# Patient Record
Sex: FEMALE | Race: WHITE | Employment: OTHER | URBAN - METROPOLITAN AREA
[De-identification: names, ages, dates, MRNs, and addresses within clinical notes are randomized per-mention and may not be internally consistent; named-entity substitution may affect disease eponyms.]

---

## 2017-11-21 ENCOUNTER — OFFICE VISIT (OUTPATIENT)
Dept: SURGERY | Age: 72
End: 2017-11-21

## 2017-11-21 VITALS
WEIGHT: 210 LBS | SYSTOLIC BLOOD PRESSURE: 122 MMHG | HEART RATE: 79 BPM | BODY MASS INDEX: 34.99 KG/M2 | DIASTOLIC BLOOD PRESSURE: 71 MMHG | HEIGHT: 65 IN

## 2017-11-21 DIAGNOSIS — D05.12 DUCTAL CARCINOMA IN SITU (DCIS) OF LEFT BREAST: ICD-10-CM

## 2017-11-21 RX ORDER — LISINOPRIL AND HYDROCHLOROTHIAZIDE 10; 12.5 MG/1; MG/1
TABLET ORAL
COMMUNITY
Start: 2017-09-21

## 2017-11-21 RX ORDER — SERTRALINE HYDROCHLORIDE 100 MG/1
TABLET, FILM COATED ORAL
Refills: 1 | COMMUNITY
Start: 2017-09-07

## 2017-11-21 RX ORDER — OMEPRAZOLE 20 MG/1
CAPSULE, DELAYED RELEASE ORAL
COMMUNITY
Start: 2017-08-14

## 2017-11-21 RX ORDER — LEVOTHYROXINE SODIUM 88 UG/1
TABLET ORAL
COMMUNITY
Start: 2017-09-21

## 2017-11-21 RX ORDER — ATORVASTATIN CALCIUM 10 MG/1
TABLET, FILM COATED ORAL
Refills: 3 | COMMUNITY
Start: 2017-10-31

## 2017-11-21 RX ORDER — IMIPRAMINE HYDROCHLORIDE 50 MG/1
TABLET ORAL
Refills: 4 | COMMUNITY
Start: 2017-10-26

## 2017-11-21 RX ORDER — MELOXICAM 15 MG/1
TABLET ORAL
Refills: 0 | COMMUNITY
Start: 2017-08-15

## 2017-11-21 RX ORDER — TIZANIDINE 2 MG/1
TABLET ORAL
Refills: 0 | COMMUNITY
Start: 2017-08-15

## 2017-11-21 NOTE — PROGRESS NOTES
HISTORY OF PRESENT ILLNESS  Ana Mccall is a 81 y.o. female. HPI NEW patient referral for consultation by Dr. Annette Lynn for a new LEFT breast cancer diagnosis. The patient had an abnormal mammogram that led to a biopsy. She has skin lesions on her breasts from scratching, but does not have any palpable breast masses or nipple changes. She has a history of a benign brain tumor that was treated with radiation in  but is still present. She is having a lot of symptoms which she attributes to the radiation for her tumor including unsteady gait, dizziness, vision issues, losing teeth, extreme fatigue, SOB and short term memory deficit. She is accompanied by her daughter and son in law. 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 MD 6%    OB History   Obstetric Comments   Menarche: 14. LMP:52  # of Children: 3  Age at Delivery of First Child: 26.   Hysterectomy/oophorectomy:  No/no. Breast Bx: yes LEFT . Hx of Breast Feeding: no. BCP: no. Hormone therapy: no.    Family history. No breast or ovarian cancer. Sister  of brain cancer. Mammogram BIRADS 4  Pt has 4 clips in the LEFT breast.  I do not have reports yet from the mammogram, but I have a film with the cluster of calcs 10/2017 and a follow up 2017 where a clip is visible at the site and calcs are not longer present. Review of Systems   Constitutional: Positive for malaise/fatigue. Eyes: Positive for blurred vision. Cardiovascular: Positive for orthopnea. Gastrointestinal: Positive for diarrhea. Genitourinary: Negative. Musculoskeletal: Positive for back pain, joint pain and myalgias. Neurological: Positive for dizziness and weakness. Psychiatric/Behavioral: Positive for depression and memory loss. The patient is nervous/anxious and has insomnia. Physical Exam   Pulmonary/Chest: Right breast exhibits no inverted nipple, no mass, no nipple discharge, no skin change and no tenderness.  Left breast exhibits skin change (ecchymosis lateral breast at biopsy site.   scratch marks upper breast). Left breast exhibits no inverted nipple, no mass, no nipple discharge and no tenderness. Breasts are symmetrical.       Lymphadenopathy:     She has no cervical adenopathy. She has no axillary adenopathy. Right: No supraclavicular adenopathy present. Left: No supraclavicular adenopathy present. ASSESSMENT and PLAN    ICD-10-CM ICD-9-CM    1. Ductal carcinoma in situ (DCIS) of left breast D05.12 233.0 BORA MAMMO LT DX INCL CAD       45 minutes were spent face-to-face with the patient, her daughter and son-in-law during this encounter and 80 of that time was spent on counseling and coordination of care. 1. Discussed lumpectomy vs observation. We discussed stage 0 breast cancer. Standard treatment for DCIS was surgical excision, but now observation of DCIS is being considered. She is a good candidate for observation as her disease is localized, and may have all been removed with the needle biopsy. She has co-morbidites and avoiding surgery is beneficial.  We reviewed her mammograms. She had a small cluster of microcalcifications biopsied, and no microcalcifications seen on the post-biopsy mammogram.  It is possible all the DCIS was removed with the needle biopsy. 2. If she had a lumpectomy and the amount of disease was minimal she would not need radiation. She did not want any radiation as she feels the brain radiation contributed to her current problems. 3. Endocrine therapy may decrease her risk of recurrence or a new tumor, but risks may outweigh benefit. 4. Her tumor is non-invasive so sentinel node biopsy is not necessary. PLAN:  LEFT breast mammogram in 6 months, then yearly mammograms. I have given her a prescription for a LEFT diagnostic mammogram which she will schedule in May 2018 at [facility]. If there is increased activity on the mammogram then lumpectomy would be recommended.   I reviewed the process of needle localized lumpectomy with sedation anesthesia.

## 2017-11-21 NOTE — MR AVS SNAPSHOT
Visit Information Date & Time Provider Department Dept. Phone Encounter #  
 11/21/2017 12:00 PM Eduardo Boswell MD [facility] [id number] Upcoming Health Maintenance Date Due Hepatitis C Screening 1945 DTaP/Tdap/Td series (1 - Tdap) 2/22/1968 BREAST CANCER SCRN MAMMOGRAM 2/22/1997 FOBT Q 1 YEAR AGE 50-75 2/22/1997 ZOSTER VACCINE AGE 60> 12/22/2006 GLAUCOMA SCREENING Q2Y 2/22/2012 OSTEOPOROSIS SCREENING (DEXA) 2/22/2012 Pneumococcal 65+ Low/Medium Risk (1 of 2 - PCV13) 2/22/2012 MEDICARE YEARLY EXAM 2/22/2012 Influenza Age 5 to Adult 8/1/2017 Allergies as of 11/21/2017  Review Complete On: 11/21/2017 By: Eduardo Boswell MD  
 No Known Allergies Current Immunizations  Never Reviewed No immunizations on file. Not reviewed this visit You Were Diagnosed With   
  
 Codes Comments Ductal carcinoma in situ (DCIS) of left breast     ICD-10-CM: D05.12 
ICD-9-CM: 233.0 Vitals BP Pulse Height(growth percentile) Weight(growth percentile) BMI OB Status 122/71 79 5' 4.5\" (1.638 m) 210 lb (95.3 kg) 35.49 kg/m2 Postmenopausal  
 Smoking Status Never Smoker BMI and BSA Data Body Mass Index Body Surface Area  
 35.49 kg/m 2 2.08 m 2 Your Updated Medication List  
  
   
This list is accurate as of: 11/21/17  4:14 PM.  Always use your most recent med list.  
  
  
  
  
 atorvastatin 10 mg tablet Commonly known as:  LIPITOR TK 1 T PO QD  
  
 imipramine 50 mg tablet Commonly known as:  TOFRANIL  
TK 1 T PO QD HS  
  
 levothyroxine 88 mcg tablet Commonly known as:  SYNTHROID  
  
 lisinopril-hydroCHLOROthiazide 10-12.5 mg per tablet Commonly known as:  PRINZIDE, ZESTORETIC  
  
 meloxicam 15 mg tablet Commonly known as:  MOBIC TK 1 T PO QD  
  
 omeprazole 20 mg capsule Commonly known as:  PRILOSEC  
  
 sertraline 100 mg tablet Commonly known as:  ZOLOFT  
TK 1 T PO D  
  
 tiZANidine 2 mg tablet Commonly known as:  Clemencia Chang TK 1 T PO TID To-Do List   
 05/21/2018 Imaging:  BORA MAMMO LT DX INCL CAD Patient Instructions Breast Cancer: Care Instructions Your Care Instructions Breast cancer occurs when abnormal cells grow out of control in the breast. These cancer cells can spread within the breast, to nearby lymph nodes and other tissues, and to other parts of the body. Being treated for cancer can weaken your body, and you may feel very tired. Get the rest your body needs so you can feel better. Finding out that you have cancer is scary. You may feel many emotions and may need some help coping. Seek out family, friends, and counselors for support. You also can do things at home to make yourself feel better while you go through treatment. Call the [employer] [facility] ([phone]) or visit its website at [employer] for more information. Follow-up care is a key part of your treatment and safety. Be sure to make and go to all appointments, and call your doctor if you are having problems. It's also a good idea to know your test results and keep a list of the medicines you take. How can you care for yourself at home? · Take your medicines exactly as prescribed. Call your doctor if you think you are having a problem with your medicine. You may get medicine for nausea and vomiting if you have these side effects. · Follow your doctor's instructions to relieve pain. Pain from cancer and surgery can almost always be controlled. Use pain medicine when you first notice pain, before it becomes severe. · Eat healthy food. If you do not feel like eating, try to eat food that has protein and extra calories to keep up your strength and prevent weight loss. Drink liquid meal replacements for extra calories and protein. Try to eat your main meal early. · Get some physical activity every day, but do not get too tired. Keep doing the hobbies you enjoy as your energy allows. · Do not smoke. Smoking can make your cancer worse. If you need help quitting, talk to your doctor about stop-smoking programs and medicines. These can increase your chances of quitting for good. · Take steps to control your stress and workload. Learn relaxation techniques. ¨ Share your feelings. Stress and tension affect our emotions. By expressing your feelings to others, you may be able to understand and cope with them. ¨ Consider joining a support group. Talking about a problem with your spouse, a good friend, or other people with similar problems is a good way to reduce tension and stress. ¨ Express yourself through art. Try writing, crafts, dance, or art to relieve stress. Some dance, writing, or art groups may be available just for people who have cancer. ¨ Be kind to your body and mind. Getting enough sleep, eating a healthy diet, and taking time to do things you enjoy can contribute to an overall feeling of balance in your life and can help reduce stress. ¨ Get help if you need it. Discuss your concerns with your doctor or counselor. · If you are vomiting or have diarrhea: ¨ Drink plenty of fluids (enough so that your urine is light yellow or clear like water) to prevent dehydration. Choose water and other caffeine-free clear liquids. If you have kidney, heart, or liver disease and have to limit fluids, talk with your doctor before you increase the amount of fluids you drink. ¨ When you are able to eat, try clear soups, mild foods, and liquids until all symptoms are gone for 12 to 48 hours. Other good choices include dry toast, crackers, cooked cereal, and gelatin dessert, such as Jell-O. · If you have not already done so, prepare a list of advance directives.  Advance directives are instructions to your doctor and family members about what kind of care you want if you become unable to speak or express yourself. When should you call for help? Call 911 anytime you think you may need emergency care. For example, call if: 
? · You passed out (lost consciousness). ?Call your doctor now or seek immediate medical care if: 
? · You have a fever. ? · You have abnormal bleeding. ? · You think you have an infection. ? · You have new or worse pain. ? · You have new symptoms, such as a cough, belly pain, vomiting, diarrhea, or a rash. ? Watch closely for changes in your health, and be sure to contact your doctor if: 
? · You are much more tired than usual.  
? · You have swollen glands in your armpits, groin, or neck. ? · You do not get better as expected. Where can you learn more? Go to http://arlene-william.info/. Enter V321 in the search box to learn more about \"Breast Cancer: Care Instructions. \" Current as of: May 12, 2017 Content Version: 11.4 © 1982-5024 [employer]. Care instructions adapted under license by [employer] (which disclaims liability or warranty for this information). If you have questions about a medical condition or this instruction, always ask your healthcare professional. [address] any warranty or liability for your use of this information. Introducing [facility]! Geoffrey Ricks introduces [employer] patient portal. Now you can access parts of your medical record, email your doctor's office, and request medication refills online. 1. In your internet browser, go to [email or web address] 2. Click on the First Time User? Click Here link in the Sign In box. You will see the New Member Sign Up page. 3. Enter your [employer] Access Code exactly as it appears below. You will not need to use this code after youve completed the sign-up process. If you do not sign up before the expiration date, you must request a new code. · [employer] Access Code: [email or web address] Expires: 2/19/2018 11:21 AM 
 
4. Enter the last four digits of your Social Security Number (xxxx) and Date of Birth (mm/dd/yyyy) as indicated and click Submit. You will be taken to the next sign-up page. 5. Create a [employer] ID. This will be your [employer] login ID and cannot be changed, so think of one that is secure and easy to remember. 6. Create a [employer] password. You can change your password at any time. 7. Enter your Password Reset Question and Answer. This can be used at a later time if you forget your password. 8. Enter your e-mail address. You will receive e-mail notification when new information is available in [address] 9. Click Sign Up. You can now view and download portions of your medical record. 10. Click the Download Summary menu link to download a portable copy of your medical information. If you have questions, please visit the Frequently Asked Questions section of the [employer] website. Remember, [employer] is NOT to be used for urgent needs. For medical emergencies, dial 911. Now available from your iPhone and Android! Please provide this summary of care documentation to your next provider. Your primary care clinician is listed as Mili Bradshaw. If you have any questions after today's visit, please call [phone].

## 2017-11-21 NOTE — PATIENT INSTRUCTIONS
Breast Cancer: Care Instructions  Your Care Instructions    Breast cancer occurs when abnormal cells grow out of control in the breast. These cancer cells can spread within the breast, to nearby lymph nodes and other tissues, and to other parts of the body. Being treated for cancer can weaken your body, and you may feel very tired. Get the rest your body needs so you can feel better. Finding out that you have cancer is scary. You may feel many emotions and may need some help coping. Seek out family, friends, and counselors for support. You also can do things at home to make yourself feel better while you go through treatment. Call the [employer] (2-475.728.4342) or visit its website at [employer] for more information. Follow-up care is a key part of your treatment and safety. Be sure to make and go to all appointments, and call your doctor if you are having problems. It's also a good idea to know your test results and keep a list of the medicines you take. How can you care for yourself at home? · Take your medicines exactly as prescribed. Call your doctor if you think you are having a problem with your medicine. You may get medicine for nausea and vomiting if you have these side effects. · Follow your doctor's instructions to relieve pain. Pain from cancer and surgery can almost always be controlled. Use pain medicine when you first notice pain, before it becomes severe. · Eat healthy food. If you do not feel like eating, try to eat food that has protein and extra calories to keep up your strength and prevent weight loss. Drink liquid meal replacements for extra calories and protein. Try to eat your main meal early. · Get some physical activity every day, but do not get too tired. Keep doing the hobbies you enjoy as your energy allows. · Do not smoke. Smoking can make your cancer worse. If you need help quitting, talk to your doctor about stop-smoking programs and medicines.  These can increase your chances of quitting for good. · Take steps to control your stress and workload. Learn relaxation techniques. ¨ Share your feelings. Stress and tension affect our emotions. By expressing your feelings to others, you may be able to understand and cope with them. ¨ Consider joining a support group. Talking about a problem with your spouse, a good friend, or other people with similar problems is a good way to reduce tension and stress. ¨ Express yourself through art. Try writing, crafts, dance, or art to relieve stress. Some dance, writing, or art groups may be available just for people who have cancer. ¨ Be kind to your body and mind. Getting enough sleep, eating a healthy diet, and taking time to do things you enjoy can contribute to an overall feeling of balance in your life and can help reduce stress. ¨ Get help if you need it. Discuss your concerns with your doctor or counselor. · If you are vomiting or have diarrhea:  ¨ Drink plenty of fluids (enough so that your urine is light yellow or clear like water) to prevent dehydration. Choose water and other caffeine-free clear liquids. If you have kidney, heart, or liver disease and have to limit fluids, talk with your doctor before you increase the amount of fluids you drink. ¨ When you are able to eat, try clear soups, mild foods, and liquids until all symptoms are gone for 12 to 48 hours. Other good choices include dry toast, crackers, cooked cereal, and gelatin dessert, such as Jell-O.  · If you have not already done so, prepare a list of advance directives. Advance directives are instructions to your doctor and family members about what kind of care you want if you become unable to speak or express yourself. When should you call for help? Call 911 anytime you think you may need emergency care. For example, call if:  ? · You passed out (lost consciousness). ?Call your doctor now or seek immediate medical care if:  ? · You have a fever. ? · You have abnormal bleeding. ? · You think you have an infection. ? · You have new or worse pain. ? · You have new symptoms, such as a cough, belly pain, vomiting, diarrhea, or a rash. ? Watch closely for changes in your health, and be sure to contact your doctor if:  ? · You are much more tired than usual.   ? · You have swollen glands in your armpits, groin, or neck. ? · You do not get better as expected. Where can you learn more? Go to http://arlene-william.info/. Enter V321 in the search box to learn more about \"Breast Cancer: Care Instructions. \"  Current as of: May 12, 2017  Content Version: 11.4  © 4565-0070 [employer]. Care instructions adapted under license by [employer] (which disclaims liability or warranty for this information). If you have questions about a medical condition or this instruction, always ask your healthcare professional. [address] any warranty or liability for your use of this information.

## 2017-11-21 NOTE — COMMUNICATION BODY
HISTORY OF PRESENT ILLNESS  Ashok Webb is a 81 y.o. female. HPI NEW patient referral for consultation by Dr. Alisa Lindsey for a new LEFT breast cancer diagnosis. The patient had an abnormal mammogram that led to a biopsy. She has skin lesions on her breasts from scratching, but does not have any palpable breast masses or nipple changes. She has a history of a benign brain tumor that was treated with radiation in  but is still present. She is having a lot of symptoms which she attributes to the radiation for her tumor including unsteady gait, dizziness, vision issues, losing teeth, extreme fatigue, SOB and short term memory deficit. She is accompanied by her daughter and son in law. 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 OH 6%    OB History   Obstetric Comments   Menarche: 14. LMP:52  # of Children: 3  Age at Delivery of First Child: 26.   Hysterectomy/oophorectomy:  No/no. Breast Bx: yes LEFT . Hx of Breast Feeding: no. BCP: no. Hormone therapy: no.    Family history. No breast or ovarian cancer. Sister  of brain cancer. Mammogram BIRADS 4  Pt has 4 clips in the LEFT breast.  I do not have reports yet from the mammogram, but I have a film with the cluster of calcs 10/2017 and a follow up 2017 where a clip is visible at the site and calcs are not longer present. Review of Systems   Constitutional: Positive for malaise/fatigue. Eyes: Positive for blurred vision. Cardiovascular: Positive for orthopnea. Gastrointestinal: Positive for diarrhea. Genitourinary: Negative. Musculoskeletal: Positive for back pain, joint pain and myalgias. Neurological: Positive for dizziness and weakness. Psychiatric/Behavioral: Positive for depression and memory loss. The patient is nervous/anxious and has insomnia. Physical Exam   Pulmonary/Chest: Right breast exhibits no inverted nipple, no mass, no nipple discharge, no skin change and no tenderness.  Left breast exhibits skin change (ecchymosis lateral breast at biopsy site.   scratch marks upper breast). Left breast exhibits no inverted nipple, no mass, no nipple discharge and no tenderness. Breasts are symmetrical.       Lymphadenopathy:     She has no cervical adenopathy. She has no axillary adenopathy. Right: No supraclavicular adenopathy present. Left: No supraclavicular adenopathy present. ASSESSMENT and PLAN    ICD-10-CM ICD-9-CM    1. Ductal carcinoma in situ (DCIS) of left breast D05.12 233.0 BORA MAMMO LT DX INCL CAD       45 minutes were spent face-to-face with the patient, her daughter and son-in-law during this encounter and 80 of that time was spent on counseling and coordination of care. 1. Discussed lumpectomy vs observation. We discussed stage 0 breast cancer. Standard treatment for DCIS was surgical excision, but now observation of DCIS is being considered. She is a good candidate for observation as her disease is localized, and may have all been removed with the needle biopsy. She has co-morbidites and avoiding surgery is beneficial.  We reviewed her mammograms. She had a small cluster of microcalcifications biopsied, and no microcalcifications seen on the post-biopsy mammogram.  It is possible all the DCIS was removed with the needle biopsy. 2. If she had a lumpectomy and the amount of disease was minimal she would not need radiation. She did not want any radiation as she feels the brain radiation contributed to her current problems. 3. Endocrine therapy may decrease her risk of recurrence or a new tumor, but risks may outweigh benefit. 4. Her tumor is non-invasive so sentinel node biopsy is not necessary. PLAN:  LEFT breast mammogram in 6 months, then yearly mammograms. I have given her a prescription for a LEFT diagnostic mammogram which she will schedule in May 2018 at [facility]. If there is increased activity on the mammogram then lumpectomy would be recommended.   I reviewed the process of needle localized lumpectomy with sedation anesthesia.

## 2018-01-30 ENCOUNTER — OP HISTORICAL/CONVERTED ENCOUNTER (OUTPATIENT)
Dept: OTHER | Age: 73
End: 2018-01-30

## 2018-04-04 ENCOUNTER — OP HISTORICAL/CONVERTED ENCOUNTER (OUTPATIENT)
Dept: OTHER | Age: 73
End: 2018-04-04

## 2018-05-01 ENCOUNTER — OP HISTORICAL/CONVERTED ENCOUNTER (OUTPATIENT)
Dept: OTHER | Age: 73
End: 2018-05-01

## 2018-08-01 ENCOUNTER — OP HISTORICAL/CONVERTED ENCOUNTER (OUTPATIENT)
Dept: OTHER | Age: 73
End: 2018-08-01

## 2018-08-07 ENCOUNTER — OP HISTORICAL/CONVERTED ENCOUNTER (OUTPATIENT)
Dept: OTHER | Age: 73
End: 2018-08-07

## 2019-02-11 ENCOUNTER — OP HISTORICAL/CONVERTED ENCOUNTER (OUTPATIENT)
Dept: OTHER | Age: 74
End: 2019-02-11

## 2019-08-02 ENCOUNTER — OP HISTORICAL/CONVERTED ENCOUNTER (OUTPATIENT)
Dept: OTHER | Age: 74
End: 2019-08-02

## 2019-08-07 ENCOUNTER — OP HISTORICAL/CONVERTED ENCOUNTER (OUTPATIENT)
Dept: OTHER | Age: 74
End: 2019-08-07

## 2019-09-13 ENCOUNTER — OP HISTORICAL/CONVERTED ENCOUNTER (OUTPATIENT)
Dept: OTHER | Age: 74
End: 2019-09-13

## 2019-10-11 ENCOUNTER — OP HISTORICAL/CONVERTED ENCOUNTER (OUTPATIENT)
Dept: OTHER | Age: 74
End: 2019-10-11

## 2019-10-23 ENCOUNTER — OP HISTORICAL/CONVERTED ENCOUNTER (OUTPATIENT)
Dept: OTHER | Age: 74
End: 2019-10-23

## 2020-02-04 ENCOUNTER — OP HISTORICAL/CONVERTED ENCOUNTER (OUTPATIENT)
Dept: OTHER | Age: 75
End: 2020-02-04

## 2021-01-01 ENCOUNTER — APPOINTMENT (OUTPATIENT)
Dept: GENERAL RADIOLOGY | Age: 76
DRG: 208 | End: 2021-01-01
Attending: INTERNAL MEDICINE
Payer: MEDICARE

## 2021-01-01 ENCOUNTER — APPOINTMENT (OUTPATIENT)
Dept: GENERAL RADIOLOGY | Age: 76
DRG: 208 | End: 2021-01-01
Attending: STUDENT IN AN ORGANIZED HEALTH CARE EDUCATION/TRAINING PROGRAM
Payer: MEDICARE

## 2021-01-01 ENCOUNTER — APPOINTMENT (OUTPATIENT)
Dept: NON INVASIVE DIAGNOSTICS | Age: 76
DRG: 208 | End: 2021-01-01
Attending: INTERNAL MEDICINE
Payer: MEDICARE

## 2021-01-01 ENCOUNTER — HOSPITAL ENCOUNTER (INPATIENT)
Age: 76
LOS: 25 days | DRG: 208 | End: 2021-04-29
Attending: STUDENT IN AN ORGANIZED HEALTH CARE EDUCATION/TRAINING PROGRAM | Admitting: HOSPITALIST
Payer: MEDICARE

## 2021-01-01 ENCOUNTER — APPOINTMENT (OUTPATIENT)
Dept: CT IMAGING | Age: 76
DRG: 208 | End: 2021-01-01
Attending: INTERNAL MEDICINE
Payer: MEDICARE

## 2021-01-01 VITALS
SYSTOLIC BLOOD PRESSURE: 96 MMHG | WEIGHT: 202.82 LBS | TEMPERATURE: 98.5 F | BODY MASS INDEX: 32.6 KG/M2 | HEIGHT: 66 IN | DIASTOLIC BLOOD PRESSURE: 63 MMHG | HEART RATE: 122 BPM | OXYGEN SATURATION: 100 % | RESPIRATION RATE: 30 BRPM

## 2021-01-01 DIAGNOSIS — U07.1 COVID-19: ICD-10-CM

## 2021-01-01 DIAGNOSIS — J69.0 ASPIRATION PNEUMONIA, UNSPECIFIED ASPIRATION PNEUMONIA TYPE, UNSPECIFIED LATERALITY, UNSPECIFIED PART OF LUNG (HCC): ICD-10-CM

## 2021-01-01 DIAGNOSIS — A41.9 SEPSIS, DUE TO UNSPECIFIED ORGANISM, UNSPECIFIED WHETHER ACUTE ORGAN DYSFUNCTION PRESENT (HCC): ICD-10-CM

## 2021-01-01 DIAGNOSIS — N17.9 AKI (ACUTE KIDNEY INJURY) (HCC): ICD-10-CM

## 2021-01-01 DIAGNOSIS — J18.9 PNEUMONITIS: Primary | ICD-10-CM

## 2021-01-01 DIAGNOSIS — J96.91 RESPIRATORY FAILURE WITH HYPOXIA, UNSPECIFIED CHRONICITY (HCC): ICD-10-CM

## 2021-01-01 LAB
ALBUMIN SERPL-MCNC: 1.7 G/DL (ref 3.5–5)
ALBUMIN SERPL-MCNC: 2.3 G/DL (ref 3.5–5)
ALBUMIN SERPL-MCNC: 2.4 G/DL (ref 3.5–5)
ALBUMIN SERPL-MCNC: 2.5 G/DL (ref 3.5–5)
ALBUMIN SERPL-MCNC: 2.5 G/DL (ref 3.5–5)
ALBUMIN SERPL-MCNC: 2.6 G/DL (ref 3.5–5)
ALBUMIN SERPL-MCNC: 2.7 G/DL (ref 3.5–5)
ALBUMIN SERPL-MCNC: 2.8 G/DL (ref 3.5–5)
ALBUMIN SERPL-MCNC: 2.8 G/DL (ref 3.5–5)
ALBUMIN SERPL-MCNC: 2.9 G/DL (ref 3.5–5)
ALBUMIN SERPL-MCNC: 3 G/DL (ref 3.5–5)
ALBUMIN SERPL-MCNC: 3 G/DL (ref 3.5–5)
ALBUMIN/GLOB SERPL: 0.6 {RATIO} (ref 1.1–2.2)
ALBUMIN/GLOB SERPL: 0.6 {RATIO} (ref 1.1–2.2)
ALBUMIN/GLOB SERPL: 0.7 {RATIO} (ref 1.1–2.2)
ALBUMIN/GLOB SERPL: 0.8 {RATIO} (ref 1.1–2.2)
ALP SERPL-CCNC: 100 U/L (ref 45–117)
ALP SERPL-CCNC: 102 U/L (ref 45–117)
ALP SERPL-CCNC: 68 U/L (ref 45–117)
ALP SERPL-CCNC: 68 U/L (ref 45–117)
ALP SERPL-CCNC: 73 U/L (ref 45–117)
ALP SERPL-CCNC: 75 U/L (ref 45–117)
ALP SERPL-CCNC: 76 U/L (ref 45–117)
ALP SERPL-CCNC: 78 U/L (ref 45–117)
ALP SERPL-CCNC: 82 U/L (ref 45–117)
ALP SERPL-CCNC: 83 U/L (ref 45–117)
ALP SERPL-CCNC: 84 U/L (ref 45–117)
ALP SERPL-CCNC: 85 U/L (ref 45–117)
ALP SERPL-CCNC: 87 U/L (ref 45–117)
ALP SERPL-CCNC: 87 U/L (ref 45–117)
ALP SERPL-CCNC: 90 U/L (ref 45–117)
ALP SERPL-CCNC: 91 U/L (ref 45–117)
ALP SERPL-CCNC: 95 U/L (ref 45–117)
ALP SERPL-CCNC: 97 U/L (ref 45–117)
ALP SERPL-CCNC: 98 U/L (ref 45–117)
ALT SERPL-CCNC: 23 U/L (ref 12–78)
ALT SERPL-CCNC: 25 U/L (ref 12–78)
ALT SERPL-CCNC: 25 U/L (ref 12–78)
ALT SERPL-CCNC: 26 U/L (ref 12–78)
ALT SERPL-CCNC: 26 U/L (ref 12–78)
ALT SERPL-CCNC: 27 U/L (ref 12–78)
ALT SERPL-CCNC: 27 U/L (ref 12–78)
ALT SERPL-CCNC: 28 U/L (ref 12–78)
ALT SERPL-CCNC: 28 U/L (ref 12–78)
ALT SERPL-CCNC: 29 U/L (ref 12–78)
ALT SERPL-CCNC: 30 U/L (ref 12–78)
ALT SERPL-CCNC: 30 U/L (ref 12–78)
ALT SERPL-CCNC: 31 U/L (ref 12–78)
ALT SERPL-CCNC: 32 U/L (ref 12–78)
ALT SERPL-CCNC: 34 U/L (ref 12–78)
ALT SERPL-CCNC: 34 U/L (ref 12–78)
ALT SERPL-CCNC: 35 U/L (ref 12–78)
ALT SERPL-CCNC: 37 U/L (ref 12–78)
ALT SERPL-CCNC: 37 U/L (ref 12–78)
ALT SERPL-CCNC: 39 U/L (ref 12–78)
ALT SERPL-CCNC: 42 U/L (ref 12–78)
ANION GAP SERPL CALC-SCNC: 10 MMOL/L (ref 5–15)
ANION GAP SERPL CALC-SCNC: 10 MMOL/L (ref 5–15)
ANION GAP SERPL CALC-SCNC: 15 MMOL/L (ref 5–15)
ANION GAP SERPL CALC-SCNC: 4 MMOL/L (ref 5–15)
ANION GAP SERPL CALC-SCNC: 5 MMOL/L (ref 5–15)
ANION GAP SERPL CALC-SCNC: 5 MMOL/L (ref 5–15)
ANION GAP SERPL CALC-SCNC: 6 MMOL/L (ref 5–15)
ANION GAP SERPL CALC-SCNC: 7 MMOL/L (ref 5–15)
ANION GAP SERPL CALC-SCNC: 8 MMOL/L (ref 5–15)
ANION GAP SERPL CALC-SCNC: 9 MMOL/L (ref 5–15)
APPEARANCE UR: ABNORMAL
APPEARANCE UR: CLEAR
ARTERIAL PATENCY WRIST A: ABNORMAL
ARTERIAL PATENCY WRIST A: NORMAL
ARTERIAL PATENCY WRIST A: POSITIVE
ARTERIAL PATENCY WRIST A: POSITIVE
AST SERPL W P-5'-P-CCNC: 20 U/L (ref 15–37)
AST SERPL W P-5'-P-CCNC: 21 U/L (ref 15–37)
AST SERPL W P-5'-P-CCNC: 23 U/L (ref 15–37)
AST SERPL W P-5'-P-CCNC: 27 U/L (ref 15–37)
AST SERPL W P-5'-P-CCNC: 29 U/L (ref 15–37)
AST SERPL W P-5'-P-CCNC: 30 U/L (ref 15–37)
AST SERPL W P-5'-P-CCNC: 34 U/L (ref 15–37)
AST SERPL W P-5'-P-CCNC: 36 U/L (ref 15–37)
AST SERPL W P-5'-P-CCNC: 37 U/L (ref 15–37)
AST SERPL W P-5'-P-CCNC: 37 U/L (ref 15–37)
AST SERPL W P-5'-P-CCNC: 41 U/L (ref 15–37)
AST SERPL W P-5'-P-CCNC: 42 U/L (ref 15–37)
AST SERPL W P-5'-P-CCNC: 42 U/L (ref 15–37)
AST SERPL W P-5'-P-CCNC: 43 U/L (ref 15–37)
AST SERPL W P-5'-P-CCNC: 44 U/L (ref 15–37)
AST SERPL W P-5'-P-CCNC: 45 U/L (ref 15–37)
AST SERPL W P-5'-P-CCNC: 45 U/L (ref 15–37)
AST SERPL W P-5'-P-CCNC: 48 U/L (ref 15–37)
AST SERPL W P-5'-P-CCNC: 51 U/L (ref 15–37)
AST SERPL W P-5'-P-CCNC: 52 U/L (ref 15–37)
AST SERPL W P-5'-P-CCNC: 57 U/L (ref 15–37)
ATRIAL RATE: 123 BPM
BACTERIA SPEC CULT: NORMAL
BACTERIA SPEC CULT: NORMAL
BACTERIA URNS QL MICRO: ABNORMAL /HPF
BACTERIA URNS QL MICRO: NEGATIVE /HPF
BASE DEFICIT BLDA-SCNC: 0 MMOL/L (ref 0–2)
BASE DEFICIT BLDA-SCNC: 11.4 MMOL/L (ref 0–2)
BASE DEFICIT BLDA-SCNC: 2.6 MMOL/L (ref 0–2)
BASE DEFICIT BLDA-SCNC: 8 MMOL/L (ref 0–2)
BASE EXCESS BLDA CALC-SCNC: 1.1 MMOL/L (ref 0–2)
BASE EXCESS BLDA CALC-SCNC: 3.4 MMOL/L (ref 0–2)
BASE EXCESS BLDA CALC-SCNC: 3.7 MMOL/L (ref 0–2)
BASE EXCESS BLDA CALC-SCNC: 4.2 MMOL/L (ref 0–2)
BASE EXCESS BLDA CALC-SCNC: 4.3 MMOL/L (ref 0–2)
BASE EXCESS BLDA CALC-SCNC: 4.7 MMOL/L (ref 0–2)
BASE EXCESS BLDA CALC-SCNC: 4.8 MMOL/L (ref 0–2)
BASE EXCESS BLDA CALC-SCNC: 7.2 MMOL/L (ref 0–2)
BASE EXCESS BLDA CALC-SCNC: 8.7 MMOL/L (ref 0–2)
BASE EXCESS BLDA CALC-SCNC: 8.9 MMOL/L (ref 0–2)
BASOPHILS # BLD: 0 K/UL (ref 0–0.1)
BASOPHILS NFR BLD: 0 % (ref 0–1)
BDY SITE: ABNORMAL
BDY SITE: NORMAL
BDY SITE: NORMAL
BILIRUB DIRECT SERPL-MCNC: 0.1 MG/DL (ref 0–0.2)
BILIRUB SERPL-MCNC: 0.2 MG/DL (ref 0.2–1)
BILIRUB SERPL-MCNC: 0.3 MG/DL (ref 0.2–1)
BILIRUB SERPL-MCNC: 0.4 MG/DL (ref 0.2–1)
BILIRUB SERPL-MCNC: 0.5 MG/DL (ref 0.2–1)
BILIRUB SERPL-MCNC: 0.7 MG/DL (ref 0.2–1)
BILIRUB SERPL-MCNC: 0.7 MG/DL (ref 0.2–1)
BILIRUB UR QL: NEGATIVE
BILIRUB UR QL: NEGATIVE
BNP SERPL-MCNC: 615 PG/ML
BUN SERPL-MCNC: 18 MG/DL (ref 6–20)
BUN SERPL-MCNC: 19 MG/DL (ref 6–20)
BUN SERPL-MCNC: 20 MG/DL (ref 6–20)
BUN SERPL-MCNC: 21 MG/DL (ref 6–20)
BUN SERPL-MCNC: 23 MG/DL (ref 6–20)
BUN SERPL-MCNC: 23 MG/DL (ref 6–20)
BUN SERPL-MCNC: 24 MG/DL (ref 6–20)
BUN SERPL-MCNC: 25 MG/DL (ref 6–20)
BUN SERPL-MCNC: 25 MG/DL (ref 6–20)
BUN SERPL-MCNC: 27 MG/DL (ref 6–20)
BUN SERPL-MCNC: 27 MG/DL (ref 6–20)
BUN SERPL-MCNC: 29 MG/DL (ref 6–20)
BUN SERPL-MCNC: 30 MG/DL (ref 6–20)
BUN SERPL-MCNC: 33 MG/DL (ref 6–20)
BUN SERPL-MCNC: 33 MG/DL (ref 6–20)
BUN SERPL-MCNC: 34 MG/DL (ref 6–20)
BUN SERPL-MCNC: 35 MG/DL (ref 6–20)
BUN SERPL-MCNC: 35 MG/DL (ref 6–20)
BUN SERPL-MCNC: 36 MG/DL (ref 6–20)
BUN SERPL-MCNC: 38 MG/DL (ref 6–20)
BUN SERPL-MCNC: 42 MG/DL (ref 6–20)
BUN SERPL-MCNC: 46 MG/DL (ref 6–20)
BUN/CREAT SERPL: 15 (ref 12–20)
BUN/CREAT SERPL: 16 (ref 12–20)
BUN/CREAT SERPL: 16 (ref 12–20)
BUN/CREAT SERPL: 18 (ref 12–20)
BUN/CREAT SERPL: 18 (ref 12–20)
BUN/CREAT SERPL: 19 (ref 12–20)
BUN/CREAT SERPL: 21 (ref 12–20)
BUN/CREAT SERPL: 21 (ref 12–20)
BUN/CREAT SERPL: 22 (ref 12–20)
BUN/CREAT SERPL: 23 (ref 12–20)
BUN/CREAT SERPL: 23 (ref 12–20)
BUN/CREAT SERPL: 24 (ref 12–20)
BUN/CREAT SERPL: 25 (ref 12–20)
BUN/CREAT SERPL: 30 (ref 12–20)
BUN/CREAT SERPL: 30 (ref 12–20)
BUN/CREAT SERPL: 31 (ref 12–20)
BUN/CREAT SERPL: 33 (ref 12–20)
BUN/CREAT SERPL: 33 (ref 12–20)
BUN/CREAT SERPL: 34 (ref 12–20)
BUN/CREAT SERPL: 34 (ref 12–20)
BUN/CREAT SERPL: 36 (ref 12–20)
BUN/CREAT SERPL: 38 (ref 12–20)
CA-I BLD-MCNC: 7.8 MG/DL (ref 8.5–10.1)
CA-I BLD-MCNC: 8.3 MG/DL (ref 8.5–10.1)
CA-I BLD-MCNC: 8.3 MG/DL (ref 8.5–10.1)
CA-I BLD-MCNC: 8.4 MG/DL (ref 8.5–10.1)
CA-I BLD-MCNC: 8.5 MG/DL (ref 8.5–10.1)
CA-I BLD-MCNC: 8.6 MG/DL (ref 8.5–10.1)
CA-I BLD-MCNC: 8.7 MG/DL (ref 8.5–10.1)
CA-I BLD-MCNC: 8.7 MG/DL (ref 8.5–10.1)
CA-I BLD-MCNC: 8.8 MG/DL (ref 8.5–10.1)
CA-I BLD-MCNC: 8.8 MG/DL (ref 8.5–10.1)
CA-I BLD-MCNC: 8.9 MG/DL (ref 8.5–10.1)
CA-I BLD-MCNC: 9 MG/DL (ref 8.5–10.1)
CA-I BLD-MCNC: 9 MG/DL (ref 8.5–10.1)
CA-I BLD-MCNC: 9.4 MG/DL (ref 8.5–10.1)
CA-I BLD-MCNC: 9.7 MG/DL (ref 8.5–10.1)
CA-I BLD-MCNC: 9.8 MG/DL (ref 8.5–10.1)
CA-I BLD-MCNC: 9.9 MG/DL (ref 8.5–10.1)
CALCULATED P AXIS, ECG09: 57 DEGREES
CALCULATED R AXIS, ECG10: -32 DEGREES
CALCULATED T AXIS, ECG11: -176 DEGREES
CHLORIDE SERPL-SCNC: 100 MMOL/L (ref 97–108)
CHLORIDE SERPL-SCNC: 101 MMOL/L (ref 97–108)
CHLORIDE SERPL-SCNC: 101 MMOL/L (ref 97–108)
CHLORIDE SERPL-SCNC: 102 MMOL/L (ref 97–108)
CHLORIDE SERPL-SCNC: 103 MMOL/L (ref 97–108)
CHLORIDE SERPL-SCNC: 104 MMOL/L (ref 97–108)
CHLORIDE SERPL-SCNC: 104 MMOL/L (ref 97–108)
CHLORIDE SERPL-SCNC: 106 MMOL/L (ref 97–108)
CHLORIDE SERPL-SCNC: 106 MMOL/L (ref 97–108)
CHLORIDE SERPL-SCNC: 107 MMOL/L (ref 97–108)
CHLORIDE SERPL-SCNC: 96 MMOL/L (ref 97–108)
CHLORIDE SERPL-SCNC: 97 MMOL/L (ref 97–108)
CHLORIDE SERPL-SCNC: 98 MMOL/L (ref 97–108)
CHLORIDE SERPL-SCNC: 98 MMOL/L (ref 97–108)
CHLORIDE SERPL-SCNC: 99 MMOL/L (ref 97–108)
CHLORIDE SERPL-SCNC: 99 MMOL/L (ref 97–108)
CO2 SERPL-SCNC: 18 MMOL/L (ref 21–32)
CO2 SERPL-SCNC: 23 MMOL/L (ref 21–32)
CO2 SERPL-SCNC: 23 MMOL/L (ref 21–32)
CO2 SERPL-SCNC: 24 MMOL/L (ref 21–32)
CO2 SERPL-SCNC: 24 MMOL/L (ref 21–32)
CO2 SERPL-SCNC: 25 MMOL/L (ref 21–32)
CO2 SERPL-SCNC: 26 MMOL/L (ref 21–32)
CO2 SERPL-SCNC: 27 MMOL/L (ref 21–32)
CO2 SERPL-SCNC: 28 MMOL/L (ref 21–32)
CO2 SERPL-SCNC: 28 MMOL/L (ref 21–32)
CO2 SERPL-SCNC: 29 MMOL/L (ref 21–32)
CO2 SERPL-SCNC: 30 MMOL/L (ref 21–32)
CO2 SERPL-SCNC: 31 MMOL/L (ref 21–32)
CO2 SERPL-SCNC: 31 MMOL/L (ref 21–32)
CO2 SERPL-SCNC: 32 MMOL/L (ref 21–32)
CO2 SERPL-SCNC: 32 MMOL/L (ref 21–32)
CO2 SERPL-SCNC: 33 MMOL/L (ref 21–32)
CO2 SERPL-SCNC: 34 MMOL/L (ref 21–32)
COLOR UR: ABNORMAL
COLOR UR: YELLOW
COVID-19 RAPID TEST, COVR: NOT DETECTED
CREAT SERPL-MCNC: 0.87 MG/DL (ref 0.55–1.02)
CREAT SERPL-MCNC: 0.93 MG/DL (ref 0.55–1.02)
CREAT SERPL-MCNC: 0.93 MG/DL (ref 0.55–1.02)
CREAT SERPL-MCNC: 0.96 MG/DL (ref 0.55–1.02)
CREAT SERPL-MCNC: 0.96 MG/DL (ref 0.55–1.02)
CREAT SERPL-MCNC: 0.98 MG/DL (ref 0.55–1.02)
CREAT SERPL-MCNC: 0.98 MG/DL (ref 0.55–1.02)
CREAT SERPL-MCNC: 0.99 MG/DL (ref 0.55–1.02)
CREAT SERPL-MCNC: 1.01 MG/DL (ref 0.55–1.02)
CREAT SERPL-MCNC: 1.03 MG/DL (ref 0.55–1.02)
CREAT SERPL-MCNC: 1.07 MG/DL (ref 0.55–1.02)
CREAT SERPL-MCNC: 1.09 MG/DL (ref 0.55–1.02)
CREAT SERPL-MCNC: 1.11 MG/DL (ref 0.55–1.02)
CREAT SERPL-MCNC: 1.12 MG/DL (ref 0.55–1.02)
CREAT SERPL-MCNC: 1.13 MG/DL (ref 0.55–1.02)
CREAT SERPL-MCNC: 1.15 MG/DL (ref 0.55–1.02)
CREAT SERPL-MCNC: 1.16 MG/DL (ref 0.55–1.02)
CREAT SERPL-MCNC: 1.17 MG/DL (ref 0.55–1.02)
CREAT SERPL-MCNC: 1.17 MG/DL (ref 0.55–1.02)
CREAT SERPL-MCNC: 1.19 MG/DL (ref 0.55–1.02)
CREAT SERPL-MCNC: 1.23 MG/DL (ref 0.55–1.02)
CREAT SERPL-MCNC: 1.35 MG/DL (ref 0.55–1.02)
CREAT SERPL-MCNC: 1.41 MG/DL (ref 0.55–1.02)
CREAT SERPL-MCNC: 1.42 MG/DL (ref 0.55–1.02)
CREAT SERPL-MCNC: 1.55 MG/DL (ref 0.55–1.02)
CREAT SERPL-MCNC: 2.74 MG/DL (ref 0.55–1.02)
CRP SERPL-MCNC: 1.69 MG/DL (ref 0–0.6)
CRP SERPL-MCNC: 3.52 MG/DL (ref 0–0.6)
CRP SERPL-MCNC: 5.09 MG/DL (ref 0–0.6)
CRP SERPL-MCNC: 9.66 MG/DL (ref 0–0.6)
D DIMER PPP FEU-MCNC: 0.54 UG/ML(FEU)
D DIMER PPP FEU-MCNC: 0.73 UG/ML(FEU)
D DIMER PPP FEU-MCNC: 2.81 UG/ML(FEU)
DIAGNOSIS, 93000: NORMAL
DIFFERENTIAL METHOD BLD: ABNORMAL
ECHO AO ROOT DIAM: 2.9 CM
ECHO AV PEAK GRADIENT: 5 MMHG
ECHO LA MAJOR AXIS: 3.4 CM
ECHO LA MINOR AXIS: 1.7 CM
ECHO LA TO AORTIC ROOT RATIO: 1.17
ECHO LV EDV A2C: 37.3 CM3
ECHO LV EJECTION FRACTION BIPLANE: 64.3 % (ref 55–100)
ECHO LV ESV A2C: 10.6 CM3
ECHO LV INTERNAL DIMENSION DIASTOLIC: 3.34 CM (ref 3.9–5.3)
ECHO LV INTERNAL DIMENSION SYSTOLIC: 2.2 CM
ECHO LV IVSD: 1.35 CM (ref 0.6–0.9)
ECHO LV MASS 2D: 151.1 G (ref 67–162)
ECHO LV MASS INDEX 2D: 75.4 G/M2 (ref 43–95)
ECHO LV POSTERIOR WALL DIASTOLIC: 1.33 CM (ref 0.6–0.9)
ECHO LVOT PEAK GRADIENT: 3 MMHG
ECHO MV A VELOCITY: 77.7 CM/S
ECHO MV AREA PHT: 2.86 CM2
ECHO MV E DECELERATION TIME (DT): 169 MS
ECHO MV E VELOCITY: 54.3 CM/S
ECHO MV E/A RATIO: 0.7
ECHO MV PRESSURE HALF TIME (PHT): 77 MS
ECHO PV PEAK INSTANTANEOUS GRADIENT SYSTOLIC: 6 MMHG
ECHO PV REGURGITANT MAX VELOCITY: 109 CM/S
ECHO PV REGURGITANT MAX VELOCITY: 121 CM/S
ECHO PV REGURGITANT MAX VELOCITY: 82.1 CM/S
ECHO RV INTERNAL DIMENSION: 2.31 CM
ECHO TV MAX VELOCITY: 172 CM/S
ECHO TV REGURGITANT PEAK GRADIENT: 12 MMHG
EOSINOPHIL # BLD: 0 K/UL (ref 0–0.4)
EOSINOPHIL # BLD: 0 K/UL (ref 0–0.4)
EOSINOPHIL # BLD: 0.1 K/UL (ref 0–0.4)
EOSINOPHIL # BLD: 0.2 K/UL (ref 0–0.4)
EOSINOPHIL # BLD: 0.4 K/UL (ref 0–0.4)
EOSINOPHIL # BLD: 0.5 K/UL (ref 0–0.4)
EOSINOPHIL NFR BLD: 0 % (ref 0–7)
EOSINOPHIL NFR BLD: 0 % (ref 0–7)
EOSINOPHIL NFR BLD: 1 % (ref 0–7)
EOSINOPHIL NFR BLD: 2 % (ref 0–7)
EOSINOPHIL NFR BLD: 4 % (ref 0–7)
EOSINOPHIL NFR BLD: 4 % (ref 0–7)
EPAP/CPAP/PEEP, PAPEEP: 0
EPAP/CPAP/PEEP, PAPEEP: 10
EPAP/CPAP/PEEP, PAPEEP: 5
EPAP/CPAP/PEEP, PAPEEP: 8
ERYTHROCYTE [DISTWIDTH] IN BLOOD BY AUTOMATED COUNT: 16.1 % (ref 11.5–14.5)
ERYTHROCYTE [DISTWIDTH] IN BLOOD BY AUTOMATED COUNT: 16.2 % (ref 11.5–14.5)
ERYTHROCYTE [DISTWIDTH] IN BLOOD BY AUTOMATED COUNT: 16.4 % (ref 11.5–14.5)
ERYTHROCYTE [DISTWIDTH] IN BLOOD BY AUTOMATED COUNT: 16.5 % (ref 11.5–14.5)
ERYTHROCYTE [DISTWIDTH] IN BLOOD BY AUTOMATED COUNT: 16.5 % (ref 11.5–14.5)
ERYTHROCYTE [DISTWIDTH] IN BLOOD BY AUTOMATED COUNT: 16.6 % (ref 11.5–14.5)
ERYTHROCYTE [DISTWIDTH] IN BLOOD BY AUTOMATED COUNT: 16.7 % (ref 11.5–14.5)
ERYTHROCYTE [DISTWIDTH] IN BLOOD BY AUTOMATED COUNT: 16.7 % (ref 11.5–14.5)
ERYTHROCYTE [DISTWIDTH] IN BLOOD BY AUTOMATED COUNT: 16.8 % (ref 11.5–14.5)
ERYTHROCYTE [DISTWIDTH] IN BLOOD BY AUTOMATED COUNT: 17 % (ref 11.5–14.5)
ERYTHROCYTE [DISTWIDTH] IN BLOOD BY AUTOMATED COUNT: 17.4 % (ref 11.5–14.5)
ERYTHROCYTE [DISTWIDTH] IN BLOOD BY AUTOMATED COUNT: 17.4 % (ref 11.5–14.5)
ERYTHROCYTE [DISTWIDTH] IN BLOOD BY AUTOMATED COUNT: 17.9 % (ref 11.5–14.5)
ERYTHROCYTE [DISTWIDTH] IN BLOOD BY AUTOMATED COUNT: 17.9 % (ref 11.5–14.5)
ERYTHROCYTE [DISTWIDTH] IN BLOOD BY AUTOMATED COUNT: 18 % (ref 11.5–14.5)
ERYTHROCYTE [DISTWIDTH] IN BLOOD BY AUTOMATED COUNT: 18 % (ref 11.5–14.5)
ERYTHROCYTE [DISTWIDTH] IN BLOOD BY AUTOMATED COUNT: 18.1 % (ref 11.5–14.5)
ERYTHROCYTE [DISTWIDTH] IN BLOOD BY AUTOMATED COUNT: 18.3 % (ref 11.5–14.5)
ERYTHROCYTE [DISTWIDTH] IN BLOOD BY AUTOMATED COUNT: 18.4 % (ref 11.5–14.5)
ERYTHROCYTE [DISTWIDTH] IN BLOOD BY AUTOMATED COUNT: 18.7 % (ref 11.5–14.5)
EST. AVERAGE GLUCOSE BLD GHB EST-MCNC: 128 MG/DL
FERRITIN SERPL-MCNC: 191 NG/ML (ref 26–388)
FERRITIN SERPL-MCNC: 195 NG/ML (ref 8–252)
FERRITIN SERPL-MCNC: 252 NG/ML (ref 8–252)
FIO2 ON VENT: 100 %
FIO2 ON VENT: 100 %
FIO2 ON VENT: 40 %
FIO2 ON VENT: 50 %
FIO2 ON VENT: 55 %
FIO2 ON VENT: 60 %
FIO2 ON VENT: 65 %
FIO2 ON VENT: 70 %
FIO2 ON VENT: 70 %
FIO2 ON VENT: 75 %
FIO2 ON VENT: 75 %
FIO2 ON VENT: 80 %
FIO2 ON VENT: 80 %
FIO2 ON VENT: 85 %
GAS FLOW.O2 O2 DELIVERY SYS: 60 L/MIN
GAS FLOW.O2 SETTING OXYMISER: 12 L/MIN
GAS FLOW.O2 SETTING OXYMISER: 18 L/MIN
GAS FLOW.O2 SETTING OXYMISER: 18 L/MIN
GLOBULIN SER CALC-MCNC: 3.2 G/DL (ref 2–4)
GLOBULIN SER CALC-MCNC: 3.3 G/DL (ref 2–4)
GLOBULIN SER CALC-MCNC: 3.4 G/DL (ref 2–4)
GLOBULIN SER CALC-MCNC: 3.5 G/DL (ref 2–4)
GLOBULIN SER CALC-MCNC: 3.6 G/DL (ref 2–4)
GLOBULIN SER CALC-MCNC: 3.6 G/DL (ref 2–4)
GLOBULIN SER CALC-MCNC: 3.7 G/DL (ref 2–4)
GLOBULIN SER CALC-MCNC: 3.7 G/DL (ref 2–4)
GLOBULIN SER CALC-MCNC: 3.8 G/DL (ref 2–4)
GLOBULIN SER CALC-MCNC: 3.8 G/DL (ref 2–4)
GLOBULIN SER CALC-MCNC: 3.9 G/DL (ref 2–4)
GLOBULIN SER CALC-MCNC: 4 G/DL (ref 2–4)
GLUCOSE BLD STRIP.AUTO-MCNC: 100 MG/DL (ref 65–100)
GLUCOSE BLD STRIP.AUTO-MCNC: 107 MG/DL (ref 65–100)
GLUCOSE BLD STRIP.AUTO-MCNC: 108 MG/DL (ref 65–100)
GLUCOSE BLD STRIP.AUTO-MCNC: 110 MG/DL (ref 65–100)
GLUCOSE BLD STRIP.AUTO-MCNC: 110 MG/DL (ref 65–100)
GLUCOSE BLD STRIP.AUTO-MCNC: 111 MG/DL (ref 65–100)
GLUCOSE BLD STRIP.AUTO-MCNC: 112 MG/DL (ref 65–100)
GLUCOSE BLD STRIP.AUTO-MCNC: 112 MG/DL (ref 65–100)
GLUCOSE BLD STRIP.AUTO-MCNC: 113 MG/DL (ref 65–100)
GLUCOSE BLD STRIP.AUTO-MCNC: 114 MG/DL (ref 65–100)
GLUCOSE BLD STRIP.AUTO-MCNC: 115 MG/DL (ref 65–100)
GLUCOSE BLD STRIP.AUTO-MCNC: 116 MG/DL (ref 65–100)
GLUCOSE BLD STRIP.AUTO-MCNC: 119 MG/DL (ref 65–100)
GLUCOSE BLD STRIP.AUTO-MCNC: 119 MG/DL (ref 65–100)
GLUCOSE BLD STRIP.AUTO-MCNC: 121 MG/DL (ref 65–100)
GLUCOSE BLD STRIP.AUTO-MCNC: 121 MG/DL (ref 65–100)
GLUCOSE BLD STRIP.AUTO-MCNC: 122 MG/DL (ref 65–100)
GLUCOSE BLD STRIP.AUTO-MCNC: 124 MG/DL (ref 65–100)
GLUCOSE BLD STRIP.AUTO-MCNC: 124 MG/DL (ref 65–100)
GLUCOSE BLD STRIP.AUTO-MCNC: 125 MG/DL (ref 65–100)
GLUCOSE BLD STRIP.AUTO-MCNC: 127 MG/DL (ref 65–100)
GLUCOSE BLD STRIP.AUTO-MCNC: 130 MG/DL (ref 65–100)
GLUCOSE BLD STRIP.AUTO-MCNC: 132 MG/DL (ref 65–100)
GLUCOSE BLD STRIP.AUTO-MCNC: 132 MG/DL (ref 65–100)
GLUCOSE BLD STRIP.AUTO-MCNC: 134 MG/DL (ref 65–100)
GLUCOSE BLD STRIP.AUTO-MCNC: 136 MG/DL (ref 65–100)
GLUCOSE BLD STRIP.AUTO-MCNC: 137 MG/DL (ref 65–100)
GLUCOSE BLD STRIP.AUTO-MCNC: 137 MG/DL (ref 65–100)
GLUCOSE BLD STRIP.AUTO-MCNC: 138 MG/DL (ref 65–100)
GLUCOSE BLD STRIP.AUTO-MCNC: 139 MG/DL (ref 65–100)
GLUCOSE BLD STRIP.AUTO-MCNC: 139 MG/DL (ref 65–100)
GLUCOSE BLD STRIP.AUTO-MCNC: 141 MG/DL (ref 65–100)
GLUCOSE BLD STRIP.AUTO-MCNC: 142 MG/DL (ref 65–100)
GLUCOSE BLD STRIP.AUTO-MCNC: 142 MG/DL (ref 65–100)
GLUCOSE BLD STRIP.AUTO-MCNC: 143 MG/DL (ref 65–100)
GLUCOSE BLD STRIP.AUTO-MCNC: 149 MG/DL (ref 65–100)
GLUCOSE BLD STRIP.AUTO-MCNC: 149 MG/DL (ref 65–100)
GLUCOSE BLD STRIP.AUTO-MCNC: 151 MG/DL (ref 65–100)
GLUCOSE BLD STRIP.AUTO-MCNC: 152 MG/DL (ref 65–100)
GLUCOSE BLD STRIP.AUTO-MCNC: 154 MG/DL (ref 65–100)
GLUCOSE BLD STRIP.AUTO-MCNC: 155 MG/DL (ref 65–100)
GLUCOSE BLD STRIP.AUTO-MCNC: 158 MG/DL (ref 65–100)
GLUCOSE BLD STRIP.AUTO-MCNC: 159 MG/DL (ref 65–100)
GLUCOSE BLD STRIP.AUTO-MCNC: 171 MG/DL (ref 65–100)
GLUCOSE BLD STRIP.AUTO-MCNC: 173 MG/DL (ref 65–100)
GLUCOSE BLD STRIP.AUTO-MCNC: 175 MG/DL (ref 65–100)
GLUCOSE BLD STRIP.AUTO-MCNC: 177 MG/DL (ref 65–100)
GLUCOSE BLD STRIP.AUTO-MCNC: 182 MG/DL (ref 65–100)
GLUCOSE BLD STRIP.AUTO-MCNC: 184 MG/DL (ref 65–100)
GLUCOSE BLD STRIP.AUTO-MCNC: 191 MG/DL (ref 65–100)
GLUCOSE BLD STRIP.AUTO-MCNC: 191 MG/DL (ref 65–100)
GLUCOSE BLD STRIP.AUTO-MCNC: 201 MG/DL (ref 65–100)
GLUCOSE BLD STRIP.AUTO-MCNC: 206 MG/DL (ref 65–100)
GLUCOSE BLD STRIP.AUTO-MCNC: 210 MG/DL (ref 65–100)
GLUCOSE BLD STRIP.AUTO-MCNC: 210 MG/DL (ref 65–100)
GLUCOSE BLD STRIP.AUTO-MCNC: 211 MG/DL (ref 65–100)
GLUCOSE BLD STRIP.AUTO-MCNC: 212 MG/DL (ref 65–100)
GLUCOSE BLD STRIP.AUTO-MCNC: 216 MG/DL (ref 65–100)
GLUCOSE BLD STRIP.AUTO-MCNC: 218 MG/DL (ref 65–100)
GLUCOSE BLD STRIP.AUTO-MCNC: 222 MG/DL (ref 65–100)
GLUCOSE BLD STRIP.AUTO-MCNC: 225 MG/DL (ref 65–100)
GLUCOSE BLD STRIP.AUTO-MCNC: 239 MG/DL (ref 65–100)
GLUCOSE BLD STRIP.AUTO-MCNC: 243 MG/DL (ref 65–100)
GLUCOSE BLD STRIP.AUTO-MCNC: 302 MG/DL (ref 65–100)
GLUCOSE BLD STRIP.AUTO-MCNC: 317 MG/DL (ref 65–100)
GLUCOSE BLD STRIP.AUTO-MCNC: 361 MG/DL (ref 65–100)
GLUCOSE BLD STRIP.AUTO-MCNC: 379 MG/DL (ref 65–100)
GLUCOSE BLD STRIP.AUTO-MCNC: 71 MG/DL (ref 65–100)
GLUCOSE BLD STRIP.AUTO-MCNC: 73 MG/DL (ref 65–100)
GLUCOSE BLD STRIP.AUTO-MCNC: 82 MG/DL (ref 65–100)
GLUCOSE BLD STRIP.AUTO-MCNC: 87 MG/DL (ref 65–100)
GLUCOSE BLD STRIP.AUTO-MCNC: 92 MG/DL (ref 65–100)
GLUCOSE BLD STRIP.AUTO-MCNC: 93 MG/DL (ref 65–100)
GLUCOSE BLD STRIP.AUTO-MCNC: 94 MG/DL (ref 65–100)
GLUCOSE BLD STRIP.AUTO-MCNC: 95 MG/DL (ref 65–100)
GLUCOSE BLD STRIP.AUTO-MCNC: 95 MG/DL (ref 65–100)
GLUCOSE BLD STRIP.AUTO-MCNC: 96 MG/DL (ref 65–100)
GLUCOSE BLD STRIP.AUTO-MCNC: 96 MG/DL (ref 65–100)
GLUCOSE BLD STRIP.AUTO-MCNC: 98 MG/DL (ref 65–100)
GLUCOSE BLD STRIP.AUTO-MCNC: 99 MG/DL (ref 65–100)
GLUCOSE SERPL-MCNC: 104 MG/DL (ref 65–100)
GLUCOSE SERPL-MCNC: 117 MG/DL (ref 65–100)
GLUCOSE SERPL-MCNC: 120 MG/DL (ref 65–100)
GLUCOSE SERPL-MCNC: 120 MG/DL (ref 65–100)
GLUCOSE SERPL-MCNC: 123 MG/DL (ref 65–100)
GLUCOSE SERPL-MCNC: 135 MG/DL (ref 65–100)
GLUCOSE SERPL-MCNC: 136 MG/DL (ref 65–100)
GLUCOSE SERPL-MCNC: 137 MG/DL (ref 65–100)
GLUCOSE SERPL-MCNC: 141 MG/DL (ref 65–100)
GLUCOSE SERPL-MCNC: 142 MG/DL (ref 65–100)
GLUCOSE SERPL-MCNC: 145 MG/DL (ref 65–100)
GLUCOSE SERPL-MCNC: 146 MG/DL (ref 65–100)
GLUCOSE SERPL-MCNC: 146 MG/DL (ref 65–100)
GLUCOSE SERPL-MCNC: 147 MG/DL (ref 65–100)
GLUCOSE SERPL-MCNC: 150 MG/DL (ref 65–100)
GLUCOSE SERPL-MCNC: 157 MG/DL (ref 65–100)
GLUCOSE SERPL-MCNC: 175 MG/DL (ref 65–100)
GLUCOSE SERPL-MCNC: 184 MG/DL (ref 65–100)
GLUCOSE SERPL-MCNC: 228 MG/DL (ref 65–100)
GLUCOSE SERPL-MCNC: 333 MG/DL (ref 65–100)
GLUCOSE SERPL-MCNC: 71 MG/DL (ref 65–100)
GLUCOSE SERPL-MCNC: 76 MG/DL (ref 65–100)
GLUCOSE SERPL-MCNC: 94 MG/DL (ref 65–100)
GLUCOSE SERPL-MCNC: 98 MG/DL (ref 65–100)
GLUCOSE UR STRIP.AUTO-MCNC: NEGATIVE MG/DL
GLUCOSE UR STRIP.AUTO-MCNC: NEGATIVE MG/DL
HBA1C MFR BLD: 6.1 % (ref 4–5.6)
HCO3 BLDA-SCNC: 15 MMOL/L (ref 22–26)
HCO3 BLDA-SCNC: 18 MMOL/L (ref 22–26)
HCO3 BLDA-SCNC: 22 MMOL/L (ref 22–26)
HCO3 BLDA-SCNC: 24 MMOL/L (ref 22–26)
HCO3 BLDA-SCNC: 25 MMOL/L (ref 22–26)
HCO3 BLDA-SCNC: 27 MMOL/L (ref 22–26)
HCO3 BLDA-SCNC: 28 MMOL/L (ref 22–26)
HCO3 BLDA-SCNC: 29 MMOL/L (ref 22–26)
HCO3 BLDA-SCNC: 29 MMOL/L (ref 22–26)
HCO3 BLDA-SCNC: 31 MMOL/L (ref 22–26)
HCO3 BLDA-SCNC: 32 MMOL/L (ref 22–26)
HCO3 BLDA-SCNC: 33 MMOL/L (ref 22–26)
HCT VFR BLD AUTO: 16.2 % (ref 35–47)
HCT VFR BLD AUTO: 32.1 % (ref 35–47)
HCT VFR BLD AUTO: 33 % (ref 35–47)
HCT VFR BLD AUTO: 34.2 % (ref 35–47)
HCT VFR BLD AUTO: 34.3 % (ref 35–47)
HCT VFR BLD AUTO: 35.1 % (ref 35–47)
HCT VFR BLD AUTO: 35.2 % (ref 35–47)
HCT VFR BLD AUTO: 35.3 % (ref 35–47)
HCT VFR BLD AUTO: 35.4 % (ref 35–47)
HCT VFR BLD AUTO: 36.1 % (ref 35–47)
HCT VFR BLD AUTO: 36.6 % (ref 35–47)
HCT VFR BLD AUTO: 37.1 % (ref 35–47)
HCT VFR BLD AUTO: 38.1 % (ref 35–47)
HCT VFR BLD AUTO: 38.6 % (ref 35–47)
HCT VFR BLD AUTO: 38.6 % (ref 35–47)
HCT VFR BLD AUTO: 38.7 % (ref 35–47)
HCT VFR BLD AUTO: 38.7 % (ref 35–47)
HCT VFR BLD AUTO: 38.9 % (ref 35–47)
HCT VFR BLD AUTO: 40.1 % (ref 35–47)
HCT VFR BLD AUTO: 40.7 % (ref 35–47)
HCT VFR BLD AUTO: 41 % (ref 35–47)
HCT VFR BLD AUTO: 42.7 % (ref 35–47)
HGB BLD-MCNC: 10.5 G/DL (ref 11.5–16)
HGB BLD-MCNC: 10.9 G/DL (ref 11.5–16)
HGB BLD-MCNC: 11.1 G/DL (ref 11.5–16)
HGB BLD-MCNC: 11.2 G/DL (ref 11.5–16)
HGB BLD-MCNC: 11.5 G/DL (ref 11.5–16)
HGB BLD-MCNC: 11.6 G/DL (ref 11.5–16)
HGB BLD-MCNC: 11.7 G/DL (ref 11.5–16)
HGB BLD-MCNC: 11.9 G/DL (ref 11.5–16)
HGB BLD-MCNC: 12 G/DL (ref 11.5–16)
HGB BLD-MCNC: 12 G/DL (ref 11.5–16)
HGB BLD-MCNC: 12.2 G/DL (ref 11.5–16)
HGB BLD-MCNC: 12.6 G/DL (ref 11.5–16)
HGB BLD-MCNC: 12.8 G/DL (ref 11.5–16)
HGB BLD-MCNC: 12.9 G/DL (ref 11.5–16)
HGB BLD-MCNC: 13.4 G/DL (ref 11.5–16)
HGB BLD-MCNC: 13.5 G/DL (ref 11.5–16)
HGB BLD-MCNC: 13.5 G/DL (ref 11.5–16)
HGB BLD-MCNC: 14.4 G/DL (ref 11.5–16)
HGB BLD-MCNC: 5.1 G/DL (ref 11.5–16)
HGB UR QL STRIP: ABNORMAL
HGB UR QL STRIP: NEGATIVE
HYALINE CASTS URNS QL MICRO: >20 /LPF (ref 0–5)
HYALINE CASTS URNS QL MICRO: ABNORMAL /LPF (ref 0–5)
IMM GRANULOCYTES # BLD AUTO: 0 K/UL (ref 0–0.04)
IMM GRANULOCYTES # BLD AUTO: 0 K/UL (ref 0–0.04)
IMM GRANULOCYTES # BLD AUTO: 0.1 K/UL (ref 0–0.04)
IMM GRANULOCYTES NFR BLD AUTO: 0 % (ref 0–0.5)
IMM GRANULOCYTES NFR BLD AUTO: 0 % (ref 0–0.5)
IMM GRANULOCYTES NFR BLD AUTO: 1 % (ref 0–0.5)
IPAP/PIP, IPAPIP: 32
KETONES UR QL STRIP.AUTO: NEGATIVE MG/DL
KETONES UR QL STRIP.AUTO: NEGATIVE MG/DL
LDH SERPL L TO P-CCNC: 380 U/L (ref 81–246)
LDH SERPL L TO P-CCNC: 458 U/L (ref 81–246)
LDH SERPL L TO P-CCNC: 684 U/L (ref 81–246)
LEUKOCYTE ESTERASE UR QL STRIP.AUTO: ABNORMAL
LEUKOCYTE ESTERASE UR QL STRIP.AUTO: NEGATIVE
LYMPHOCYTES # BLD: 0.5 K/UL (ref 0.8–3.5)
LYMPHOCYTES # BLD: 0.6 K/UL (ref 0.8–3.5)
LYMPHOCYTES # BLD: 0.7 K/UL (ref 0.8–3.5)
LYMPHOCYTES # BLD: 0.8 K/UL (ref 0.8–3.5)
LYMPHOCYTES NFR BLD: 12 % (ref 12–49)
LYMPHOCYTES NFR BLD: 4 % (ref 12–49)
LYMPHOCYTES NFR BLD: 5 % (ref 12–49)
LYMPHOCYTES NFR BLD: 6 % (ref 12–49)
LYMPHOCYTES NFR BLD: 6 % (ref 12–49)
LYMPHOCYTES NFR BLD: 8 % (ref 12–49)
MAGNESIUM SERPL-MCNC: 1.4 MG/DL (ref 1.6–2.4)
MAGNESIUM SERPL-MCNC: 1.4 MG/DL (ref 1.6–2.4)
MAGNESIUM SERPL-MCNC: 1.5 MG/DL (ref 1.6–2.4)
MAGNESIUM SERPL-MCNC: 1.7 MG/DL (ref 1.6–2.4)
MAGNESIUM SERPL-MCNC: 2.1 MG/DL (ref 1.6–2.4)
MCH RBC QN AUTO: 27.8 PG (ref 26–34)
MCH RBC QN AUTO: 28.2 PG (ref 26–34)
MCH RBC QN AUTO: 28.2 PG (ref 26–34)
MCH RBC QN AUTO: 28.4 PG (ref 26–34)
MCH RBC QN AUTO: 28.6 PG (ref 26–34)
MCH RBC QN AUTO: 28.7 PG (ref 26–34)
MCH RBC QN AUTO: 28.8 PG (ref 26–34)
MCH RBC QN AUTO: 28.9 PG (ref 26–34)
MCH RBC QN AUTO: 28.9 PG (ref 26–34)
MCH RBC QN AUTO: 29.1 PG (ref 26–34)
MCH RBC QN AUTO: 29.1 PG (ref 26–34)
MCH RBC QN AUTO: 29.2 PG (ref 26–34)
MCH RBC QN AUTO: 29.3 PG (ref 26–34)
MCH RBC QN AUTO: 29.4 PG (ref 26–34)
MCH RBC QN AUTO: 29.4 PG (ref 26–34)
MCH RBC QN AUTO: 29.5 PG (ref 26–34)
MCHC RBC AUTO-ENTMCNC: 31.5 G/DL (ref 30–36.5)
MCHC RBC AUTO-ENTMCNC: 32.3 G/DL (ref 30–36.5)
MCHC RBC AUTO-ENTMCNC: 32.4 G/DL (ref 30–36.5)
MCHC RBC AUTO-ENTMCNC: 32.7 G/DL (ref 30–36.5)
MCHC RBC AUTO-ENTMCNC: 32.7 G/DL (ref 30–36.5)
MCHC RBC AUTO-ENTMCNC: 32.8 G/DL (ref 30–36.5)
MCHC RBC AUTO-ENTMCNC: 32.9 G/DL (ref 30–36.5)
MCHC RBC AUTO-ENTMCNC: 33 G/DL (ref 30–36.5)
MCHC RBC AUTO-ENTMCNC: 33 G/DL (ref 30–36.5)
MCHC RBC AUTO-ENTMCNC: 33.1 G/DL (ref 30–36.5)
MCHC RBC AUTO-ENTMCNC: 33.2 G/DL (ref 30–36.5)
MCHC RBC AUTO-ENTMCNC: 33.2 G/DL (ref 30–36.5)
MCHC RBC AUTO-ENTMCNC: 33.3 G/DL (ref 30–36.5)
MCHC RBC AUTO-ENTMCNC: 33.4 G/DL (ref 30–36.5)
MCHC RBC AUTO-ENTMCNC: 33.4 G/DL (ref 30–36.5)
MCHC RBC AUTO-ENTMCNC: 33.7 G/DL (ref 30–36.5)
MCHC RBC AUTO-ENTMCNC: 34.1 G/DL (ref 30–36.5)
MCV RBC AUTO: 84.8 FL (ref 80–99)
MCV RBC AUTO: 85.5 FL (ref 80–99)
MCV RBC AUTO: 85.8 FL (ref 80–99)
MCV RBC AUTO: 85.8 FL (ref 80–99)
MCV RBC AUTO: 85.9 FL (ref 80–99)
MCV RBC AUTO: 86 FL (ref 80–99)
MCV RBC AUTO: 86.1 FL (ref 80–99)
MCV RBC AUTO: 86.4 FL (ref 80–99)
MCV RBC AUTO: 87.5 FL (ref 80–99)
MCV RBC AUTO: 88.1 FL (ref 80–99)
MCV RBC AUTO: 88.3 FL (ref 80–99)
MCV RBC AUTO: 88.4 FL (ref 80–99)
MCV RBC AUTO: 88.6 FL (ref 80–99)
MCV RBC AUTO: 88.7 FL (ref 80–99)
MCV RBC AUTO: 88.9 FL (ref 80–99)
MCV RBC AUTO: 89 FL (ref 80–99)
MCV RBC AUTO: 89.1 FL (ref 80–99)
MCV RBC AUTO: 89.2 FL (ref 80–99)
MCV RBC AUTO: 89.8 FL (ref 80–99)
MCV RBC AUTO: 93.1 FL (ref 80–99)
MONOCYTES # BLD: 0.2 K/UL (ref 0–1)
MONOCYTES # BLD: 0.4 K/UL (ref 0–1)
MONOCYTES # BLD: 0.4 K/UL (ref 0–1)
MONOCYTES # BLD: 0.5 K/UL (ref 0–1)
MONOCYTES # BLD: 0.6 K/UL (ref 0–1)
MONOCYTES # BLD: 0.6 K/UL (ref 0–1)
MONOCYTES NFR BLD: 4 % (ref 5–13)
MONOCYTES NFR BLD: 5 % (ref 5–13)
MONOCYTES NFR BLD: 5 % (ref 5–13)
MRSA DNA SPEC QL NAA+PROBE: NOT DETECTED
MUCOUS THREADS URNS QL MICRO: ABNORMAL /LPF
MV DEC SLOPE: 2360 MM/S2
MV DEC SLOPE: 2360 MM/S2
NEUTS SEG # BLD: 10.5 K/UL (ref 1.8–8)
NEUTS SEG # BLD: 10.6 K/UL (ref 1.8–8)
NEUTS SEG # BLD: 4.4 K/UL (ref 1.8–8)
NEUTS SEG # BLD: 8.5 K/UL (ref 1.8–8)
NEUTS SEG # BLD: 9.5 K/UL (ref 1.8–8)
NEUTS SEG # BLD: 9.8 K/UL (ref 1.8–8)
NEUTS SEG NFR BLD: 83 % (ref 32–75)
NEUTS SEG NFR BLD: 85 % (ref 32–75)
NEUTS SEG NFR BLD: 86 % (ref 32–75)
NEUTS SEG NFR BLD: 86 % (ref 32–75)
NEUTS SEG NFR BLD: 89 % (ref 32–75)
NEUTS SEG NFR BLD: 89 % (ref 32–75)
NITRITE UR QL STRIP.AUTO: NEGATIVE
NITRITE UR QL STRIP.AUTO: NEGATIVE
NRBC # BLD: 0 K/UL (ref 0–0.01)
NRBC # BLD: 0 K/UL (ref 0–0.01)
NRBC # BLD: 1.9 K/UL (ref 0–0.01)
NRBC BLD-RTO: 0 PER 100 WBC
NRBC BLD-RTO: 0 PER 100 WBC
NRBC BLD-RTO: 6.4 PER 100 WBC
OTHER URINE MICRO,5051: PRESENT
P-R INTERVAL, ECG05: 142 MS
PCO2 BLDA: 27 MMHG (ref 35–45)
PCO2 BLDA: 30 MMHG (ref 35–45)
PCO2 BLDA: 36 MMHG (ref 35–45)
PCO2 BLDA: 38 MMHG (ref 35–45)
PCO2 BLDA: 39 MMHG (ref 35–45)
PCO2 BLDA: 40 MMHG (ref 35–45)
PCO2 BLDA: 41 MMHG (ref 35–45)
PCO2 BLDA: 42 MMHG (ref 35–45)
PCO2 BLDA: 43 MMHG (ref 35–45)
PCO2 BLDA: 45 MMHG (ref 35–45)
PCO2 BLDA: 46 MMHG (ref 35–45)
PCO2 BLDA: 52 MMHG (ref 35–45)
PEEP MAX SETTING VENT: 15 CM[H2O]
PERFORMED BY, TECHID: ABNORMAL
PERFORMED BY, TECHID: NORMAL
PH BLDA: 7.31 [PH] (ref 7.35–7.45)
PH BLDA: 7.36 [PH] (ref 7.35–7.45)
PH BLDA: 7.37 [PH] (ref 7.35–7.45)
PH BLDA: 7.41 [PH] (ref 7.35–7.45)
PH BLDA: 7.41 [PH] (ref 7.35–7.45)
PH BLDA: 7.42 [PH] (ref 7.35–7.45)
PH BLDA: 7.43 [PH] (ref 7.35–7.45)
PH BLDA: 7.45 [PH] (ref 7.35–7.45)
PH BLDA: 7.47 [PH] (ref 7.35–7.45)
PH BLDA: 7.48 [PH] (ref 7.35–7.45)
PH BLDA: 7.49 [PH] (ref 7.35–7.45)
PH UR STRIP: 5 [PH] (ref 5–8)
PH UR STRIP: 5 [PH] (ref 5–8)
PHOSPHATE SERPL-MCNC: 3.2 MG/DL (ref 2.6–4.7)
PHOSPHATE SERPL-MCNC: 5.1 MG/DL (ref 2.6–4.7)
PLATELET # BLD AUTO: 154 K/UL (ref 150–400)
PLATELET # BLD AUTO: 168 K/UL (ref 150–400)
PLATELET # BLD AUTO: 184 K/UL (ref 150–400)
PLATELET # BLD AUTO: 186 K/UL (ref 150–400)
PLATELET # BLD AUTO: 186 K/UL (ref 150–400)
PLATELET # BLD AUTO: 195 K/UL (ref 150–400)
PLATELET # BLD AUTO: 199 K/UL (ref 150–400)
PLATELET # BLD AUTO: 210 K/UL (ref 150–400)
PLATELET # BLD AUTO: 237 K/UL (ref 150–400)
PLATELET # BLD AUTO: 250 K/UL (ref 150–400)
PLATELET # BLD AUTO: 256 K/UL (ref 150–400)
PLATELET # BLD AUTO: 289 K/UL (ref 150–400)
PLATELET # BLD AUTO: 303 K/UL (ref 150–400)
PLATELET # BLD AUTO: 313 K/UL (ref 150–400)
PLATELET # BLD AUTO: 315 K/UL (ref 150–400)
PLATELET # BLD AUTO: 324 K/UL (ref 150–400)
PLATELET # BLD AUTO: 330 K/UL (ref 150–400)
PLATELET # BLD AUTO: 340 K/UL (ref 150–400)
PLATELET # BLD AUTO: 346 K/UL (ref 150–400)
PLATELET # BLD AUTO: 351 K/UL (ref 150–400)
PLATELET # BLD AUTO: 359 K/UL (ref 150–400)
PLATELET # BLD AUTO: 441 K/UL (ref 150–400)
PMV BLD AUTO: 10 FL (ref 8.9–12.9)
PMV BLD AUTO: 10.1 FL (ref 8.9–12.9)
PMV BLD AUTO: 10.3 FL (ref 8.9–12.9)
PMV BLD AUTO: 10.5 FL (ref 8.9–12.9)
PMV BLD AUTO: 10.6 FL (ref 8.9–12.9)
PMV BLD AUTO: 10.7 FL (ref 8.9–12.9)
PMV BLD AUTO: 10.8 FL (ref 8.9–12.9)
PMV BLD AUTO: 11 FL (ref 8.9–12.9)
PMV BLD AUTO: 11.1 FL (ref 8.9–12.9)
PMV BLD AUTO: 11.3 FL (ref 8.9–12.9)
PMV BLD AUTO: 9.5 FL (ref 8.9–12.9)
PMV BLD AUTO: 9.5 FL (ref 8.9–12.9)
PMV BLD AUTO: 9.8 FL (ref 8.9–12.9)
PMV BLD AUTO: 9.9 FL (ref 8.9–12.9)
PMV BLD AUTO: 9.9 FL (ref 8.9–12.9)
PO2 BLDA: 147 MMHG (ref 75–100)
PO2 BLDA: 148 MMHG (ref 75–100)
PO2 BLDA: 47 MMHG (ref 75–100)
PO2 BLDA: 54 MMHG (ref 75–100)
PO2 BLDA: 56 MMHG (ref 75–100)
PO2 BLDA: 57 MMHG (ref 75–100)
PO2 BLDA: 58 MMHG (ref 75–100)
PO2 BLDA: 65 MMHG (ref 75–100)
PO2 BLDA: 65 MMHG (ref 75–100)
PO2 BLDA: 66 MMHG (ref 75–100)
PO2 BLDA: 77 MMHG (ref 75–100)
PO2 BLDA: 85 MMHG (ref 75–100)
PO2 BLDA: 92 MMHG (ref 75–100)
PO2 BLDA: 95 MMHG (ref 75–100)
POTASSIUM SERPL-SCNC: 2.9 MMOL/L (ref 3.5–5.1)
POTASSIUM SERPL-SCNC: 3 MMOL/L (ref 3.5–5.1)
POTASSIUM SERPL-SCNC: 3.2 MMOL/L (ref 3.5–5.1)
POTASSIUM SERPL-SCNC: 3.3 MMOL/L (ref 3.5–5.1)
POTASSIUM SERPL-SCNC: 3.4 MMOL/L (ref 3.5–5.1)
POTASSIUM SERPL-SCNC: 3.5 MMOL/L (ref 3.5–5.1)
POTASSIUM SERPL-SCNC: 3.6 MMOL/L (ref 3.5–5.1)
POTASSIUM SERPL-SCNC: 3.7 MMOL/L (ref 3.5–5.1)
POTASSIUM SERPL-SCNC: 3.7 MMOL/L (ref 3.5–5.1)
POTASSIUM SERPL-SCNC: 3.8 MMOL/L (ref 3.5–5.1)
POTASSIUM SERPL-SCNC: 4.1 MMOL/L (ref 3.5–5.1)
POTASSIUM SERPL-SCNC: 4.2 MMOL/L (ref 3.5–5.1)
POTASSIUM SERPL-SCNC: 4.3 MMOL/L (ref 3.5–5.1)
POTASSIUM SERPL-SCNC: 4.3 MMOL/L (ref 3.5–5.1)
POTASSIUM SERPL-SCNC: 4.4 MMOL/L (ref 3.5–5.1)
POTASSIUM SERPL-SCNC: 4.6 MMOL/L (ref 3.5–5.1)
POTASSIUM SERPL-SCNC: 5.1 MMOL/L (ref 3.5–5.1)
POTASSIUM SERPL-SCNC: 5.4 MMOL/L (ref 3.5–5.1)
PROCALCITONIN SERPL-MCNC: <0.05 NG/ML
PROT SERPL-MCNC: 5.5 G/DL (ref 6.4–8.2)
PROT SERPL-MCNC: 5.5 G/DL (ref 6.4–8.2)
PROT SERPL-MCNC: 5.6 G/DL (ref 6.4–8.2)
PROT SERPL-MCNC: 5.6 G/DL (ref 6.4–8.2)
PROT SERPL-MCNC: 5.7 G/DL (ref 6.4–8.2)
PROT SERPL-MCNC: 5.8 G/DL (ref 6.4–8.2)
PROT SERPL-MCNC: 6 G/DL (ref 6.4–8.2)
PROT SERPL-MCNC: 6.1 G/DL (ref 6.4–8.2)
PROT SERPL-MCNC: 6.2 G/DL (ref 6.4–8.2)
PROT SERPL-MCNC: 6.4 G/DL (ref 6.4–8.2)
PROT SERPL-MCNC: 6.5 G/DL (ref 6.4–8.2)
PROT SERPL-MCNC: 6.6 G/DL (ref 6.4–8.2)
PROT SERPL-MCNC: 6.7 G/DL (ref 6.4–8.2)
PROT SERPL-MCNC: 6.7 G/DL (ref 6.4–8.2)
PROT UR STRIP-MCNC: 30 MG/DL
PROT UR STRIP-MCNC: NEGATIVE MG/DL
Q-T INTERVAL, ECG07: 284 MS
QRS DURATION, ECG06: 74 MS
QTC CALCULATION (BEZET), ECG08: 406 MS
RBC # BLD AUTO: 1.74 M/UL (ref 3.8–5.2)
RBC # BLD AUTO: 3.73 M/UL (ref 3.8–5.2)
RBC # BLD AUTO: 3.81 M/UL (ref 3.8–5.2)
RBC # BLD AUTO: 3.84 M/UL (ref 3.8–5.2)
RBC # BLD AUTO: 3.96 M/UL (ref 3.8–5.2)
RBC # BLD AUTO: 3.99 M/UL (ref 3.8–5.2)
RBC # BLD AUTO: 3.99 M/UL (ref 3.8–5.2)
RBC # BLD AUTO: 4.01 M/UL (ref 3.8–5.2)
RBC # BLD AUTO: 4.13 M/UL (ref 3.8–5.2)
RBC # BLD AUTO: 4.15 M/UL (ref 3.8–5.2)
RBC # BLD AUTO: 4.17 M/UL (ref 3.8–5.2)
RBC # BLD AUTO: 4.22 M/UL (ref 3.8–5.2)
RBC # BLD AUTO: 4.36 M/UL (ref 3.8–5.2)
RBC # BLD AUTO: 4.37 M/UL (ref 3.8–5.2)
RBC # BLD AUTO: 4.38 M/UL (ref 3.8–5.2)
RBC # BLD AUTO: 4.41 M/UL (ref 3.8–5.2)
RBC # BLD AUTO: 4.51 M/UL (ref 3.8–5.2)
RBC # BLD AUTO: 4.51 M/UL (ref 3.8–5.2)
RBC # BLD AUTO: 4.58 M/UL (ref 3.8–5.2)
RBC # BLD AUTO: 4.64 M/UL (ref 3.8–5.2)
RBC # BLD AUTO: 4.64 M/UL (ref 3.8–5.2)
RBC # BLD AUTO: 4.94 M/UL (ref 3.8–5.2)
RBC #/AREA URNS HPF: >100 /HPF (ref 0–5)
RBC #/AREA URNS HPF: ABNORMAL /HPF (ref 0–5)
SAO2 % BLD: 100 %
SAO2 % BLD: 100 %
SAO2 % BLD: 84 %
SAO2 % BLD: 88 %
SAO2 % BLD: 89 %
SAO2 % BLD: 91 %
SAO2 % BLD: 92 %
SAO2 % BLD: 92 %
SAO2 % BLD: 94 %
SAO2 % BLD: 94 %
SAO2 % BLD: 96 %
SAO2 % BLD: 97 %
SAO2% DEVICE SAO2% SENSOR NAME: ABNORMAL
SAO2% DEVICE SAO2% SENSOR NAME: NORMAL
SAO2% DEVICE SAO2% SENSOR NAME: NORMAL
SARS-COV-2, COV2: NORMAL
SARS-COV-2, COV2NT: NOT DETECTED
SARS-COV-2, NAA: DETECTED
SODIUM SERPL-SCNC: 132 MMOL/L (ref 136–145)
SODIUM SERPL-SCNC: 134 MMOL/L (ref 136–145)
SODIUM SERPL-SCNC: 134 MMOL/L (ref 136–145)
SODIUM SERPL-SCNC: 135 MMOL/L (ref 136–145)
SODIUM SERPL-SCNC: 136 MMOL/L (ref 136–145)
SODIUM SERPL-SCNC: 137 MMOL/L (ref 136–145)
SODIUM SERPL-SCNC: 138 MMOL/L (ref 136–145)
SODIUM SERPL-SCNC: 139 MMOL/L (ref 136–145)
SODIUM SERPL-SCNC: 139 MMOL/L (ref 136–145)
SODIUM SERPL-SCNC: 140 MMOL/L (ref 136–145)
SODIUM SERPL-SCNC: 140 MMOL/L (ref 136–145)
SP GR UR REFRACTOMETRY: 1.01 (ref 1–1.03)
SP GR UR REFRACTOMETRY: 1.02 (ref 1–1.03)
SPECIAL REQUESTS,SREQ: NORMAL
SPECIAL REQUESTS,SREQ: NORMAL
SPECIMEN SITE: ABNORMAL
SPECIMEN SITE: NORMAL
SPECIMEN SITE: NORMAL
SPECIMEN SOURCE: NORMAL
UROBILINOGEN UR QL STRIP.AUTO: 0.1 EU/DL (ref 0.1–1)
UROBILINOGEN UR QL STRIP.AUTO: 0.1 EU/DL (ref 0.1–1)
VENTILATION MODE VENT: ABNORMAL
VENTRICULAR RATE, ECG03: 123 BPM
VT SETTING VENT: 500 ML
WBC # BLD AUTO: 10.5 K/UL (ref 3.6–11)
WBC # BLD AUTO: 10.8 K/UL (ref 3.6–11)
WBC # BLD AUTO: 11 K/UL (ref 3.6–11)
WBC # BLD AUTO: 11 K/UL (ref 3.6–11)
WBC # BLD AUTO: 11.6 K/UL (ref 3.6–11)
WBC # BLD AUTO: 11.8 K/UL (ref 3.6–11)
WBC # BLD AUTO: 12 K/UL (ref 3.6–11)
WBC # BLD AUTO: 12.4 K/UL (ref 3.6–11)
WBC # BLD AUTO: 12.5 K/UL (ref 3.6–11)
WBC # BLD AUTO: 12.6 K/UL (ref 3.6–11)
WBC # BLD AUTO: 13.3 K/UL (ref 3.6–11)
WBC # BLD AUTO: 13.5 K/UL (ref 3.6–11)
WBC # BLD AUTO: 13.6 K/UL (ref 3.6–11)
WBC # BLD AUTO: 13.8 K/UL (ref 3.6–11)
WBC # BLD AUTO: 14.1 K/UL (ref 3.6–11)
WBC # BLD AUTO: 14.2 K/UL (ref 3.6–11)
WBC # BLD AUTO: 29.5 K/UL (ref 3.6–11)
WBC # BLD AUTO: 5.3 K/UL (ref 3.6–11)
WBC # BLD AUTO: 8.1 K/UL (ref 3.6–11)
WBC # BLD AUTO: 8.3 K/UL (ref 3.6–11)
WBC # BLD AUTO: 9.6 K/UL (ref 3.6–11)
WBC # BLD AUTO: 9.9 K/UL (ref 3.6–11)
WBC URNS QL MICRO: >100 /HPF (ref 0–4)
WBC URNS QL MICRO: ABNORMAL /HPF (ref 0–4)
YEAST URNS QL MICRO: PRESENT

## 2021-01-01 PROCEDURE — 74011250637 HC RX REV CODE- 250/637: Performed by: HOSPITALIST

## 2021-01-01 PROCEDURE — 74011250637 HC RX REV CODE- 250/637: Performed by: INTERNAL MEDICINE

## 2021-01-01 PROCEDURE — 74011636637 HC RX REV CODE- 636/637: Performed by: INTERNAL MEDICINE

## 2021-01-01 PROCEDURE — 99232 SBSQ HOSP IP/OBS MODERATE 35: CPT | Performed by: INTERNAL MEDICINE

## 2021-01-01 PROCEDURE — 74011250636 HC RX REV CODE- 250/636: Performed by: INTERNAL MEDICINE

## 2021-01-01 PROCEDURE — 80053 COMPREHEN METABOLIC PANEL: CPT

## 2021-01-01 PROCEDURE — 82962 GLUCOSE BLOOD TEST: CPT

## 2021-01-01 PROCEDURE — 74011250636 HC RX REV CODE- 250/636: Performed by: HOSPITALIST

## 2021-01-01 PROCEDURE — 85027 COMPLETE CBC AUTOMATED: CPT

## 2021-01-01 PROCEDURE — 36415 COLL VENOUS BLD VENIPUNCTURE: CPT

## 2021-01-01 PROCEDURE — 82728 ASSAY OF FERRITIN: CPT

## 2021-01-01 PROCEDURE — 83735 ASSAY OF MAGNESIUM: CPT

## 2021-01-01 PROCEDURE — 82803 BLOOD GASES ANY COMBINATION: CPT

## 2021-01-01 PROCEDURE — 74011000250 HC RX REV CODE- 250: Performed by: HOSPITALIST

## 2021-01-01 PROCEDURE — 36600 WITHDRAWAL OF ARTERIAL BLOOD: CPT

## 2021-01-01 PROCEDURE — 65610000006 HC RM INTENSIVE CARE

## 2021-01-01 PROCEDURE — 77010033711 HC HIGH FLOW OXYGEN

## 2021-01-01 PROCEDURE — 94660 CPAP INITIATION&MGMT: CPT

## 2021-01-01 PROCEDURE — 71045 X-RAY EXAM CHEST 1 VIEW: CPT

## 2021-01-01 PROCEDURE — 74011000258 HC RX REV CODE- 258: Performed by: INTERNAL MEDICINE

## 2021-01-01 PROCEDURE — 87086 URINE CULTURE/COLONY COUNT: CPT

## 2021-01-01 PROCEDURE — 65270000029 HC RM PRIVATE

## 2021-01-01 PROCEDURE — U0005 INFEC AGEN DETEC AMPLI PROBE: HCPCS

## 2021-01-01 PROCEDURE — 74011636637 HC RX REV CODE- 636/637: Performed by: HOSPITALIST

## 2021-01-01 PROCEDURE — 80048 BASIC METABOLIC PNL TOTAL CA: CPT

## 2021-01-01 PROCEDURE — 0BH17EZ INSERTION OF ENDOTRACHEAL AIRWAY INTO TRACHEA, VIA NATURAL OR ARTIFICIAL OPENING: ICD-10-PCS | Performed by: HOSPITALIST

## 2021-01-01 PROCEDURE — 97530 THERAPEUTIC ACTIVITIES: CPT

## 2021-01-01 PROCEDURE — 77010033678 HC OXYGEN DAILY

## 2021-01-01 PROCEDURE — 74011000250 HC RX REV CODE- 250: Performed by: INTERNAL MEDICINE

## 2021-01-01 PROCEDURE — 80076 HEPATIC FUNCTION PANEL: CPT

## 2021-01-01 PROCEDURE — 85379 FIBRIN DEGRADATION QUANT: CPT

## 2021-01-01 PROCEDURE — 74011000258 HC RX REV CODE- 258: Performed by: HOSPITALIST

## 2021-01-01 PROCEDURE — 94762 N-INVAS EAR/PLS OXIMTRY CONT: CPT

## 2021-01-01 PROCEDURE — 71250 CT THORAX DX C-: CPT

## 2021-01-01 PROCEDURE — 94002 VENT MGMT INPAT INIT DAY: CPT

## 2021-01-01 PROCEDURE — 92526 ORAL FUNCTION THERAPY: CPT

## 2021-01-01 PROCEDURE — 85025 COMPLETE CBC W/AUTO DIFF WBC: CPT

## 2021-01-01 PROCEDURE — 87641 MR-STAPH DNA AMP PROBE: CPT

## 2021-01-01 PROCEDURE — XW033E5 INTRODUCTION OF REMDESIVIR ANTI-INFECTIVE INTO PERIPHERAL VEIN, PERCUTANEOUS APPROACH, NEW TECHNOLOGY GROUP 5: ICD-10-PCS | Performed by: HOSPITALIST

## 2021-01-01 PROCEDURE — 97162 PT EVAL MOD COMPLEX 30 MIN: CPT

## 2021-01-01 PROCEDURE — 83615 LACTATE (LD) (LDH) ENZYME: CPT

## 2021-01-01 PROCEDURE — 74011250636 HC RX REV CODE- 250/636

## 2021-01-01 PROCEDURE — 74011000250 HC RX REV CODE- 250

## 2021-01-01 PROCEDURE — 92610 EVALUATE SWALLOWING FUNCTION: CPT

## 2021-01-01 PROCEDURE — 97165 OT EVAL LOW COMPLEX 30 MIN: CPT

## 2021-01-01 PROCEDURE — 86140 C-REACTIVE PROTEIN: CPT

## 2021-01-01 PROCEDURE — 99285 EMERGENCY DEPT VISIT HI MDM: CPT

## 2021-01-01 PROCEDURE — 93005 ELECTROCARDIOGRAM TRACING: CPT

## 2021-01-01 PROCEDURE — 81001 URINALYSIS AUTO W/SCOPE: CPT

## 2021-01-01 PROCEDURE — 87106 FUNGI IDENTIFICATION YEAST: CPT

## 2021-01-01 PROCEDURE — 70450 CT HEAD/BRAIN W/O DYE: CPT

## 2021-01-01 PROCEDURE — 87040 BLOOD CULTURE FOR BACTERIA: CPT

## 2021-01-01 PROCEDURE — 83036 HEMOGLOBIN GLYCOSYLATED A1C: CPT

## 2021-01-01 PROCEDURE — 73502 X-RAY EXAM HIP UNI 2-3 VIEWS: CPT

## 2021-01-01 PROCEDURE — 94003 VENT MGMT INPAT SUBQ DAY: CPT

## 2021-01-01 PROCEDURE — 93306 TTE W/DOPPLER COMPLETE: CPT

## 2021-01-01 PROCEDURE — 83880 ASSAY OF NATRIURETIC PEPTIDE: CPT

## 2021-01-01 PROCEDURE — 80069 RENAL FUNCTION PANEL: CPT

## 2021-01-01 PROCEDURE — 74018 RADEX ABDOMEN 1 VIEW: CPT

## 2021-01-01 PROCEDURE — XW033H5 INTRODUCTION OF TOCILIZUMAB INTO PERIPHERAL VEIN, PERCUTANEOUS APPROACH, NEW TECHNOLOGY GROUP 5: ICD-10-PCS | Performed by: HOSPITALIST

## 2021-01-01 PROCEDURE — C9113 INJ PANTOPRAZOLE SODIUM, VIA: HCPCS | Performed by: INTERNAL MEDICINE

## 2021-01-01 PROCEDURE — 84145 PROCALCITONIN (PCT): CPT

## 2021-01-01 PROCEDURE — 87635 SARS-COV-2 COVID-19 AMP PRB: CPT

## 2021-01-01 PROCEDURE — 5A1935Z RESPIRATORY VENTILATION, LESS THAN 24 CONSECUTIVE HOURS: ICD-10-PCS | Performed by: HOSPITALIST

## 2021-01-01 PROCEDURE — 99221 1ST HOSP IP/OBS SF/LOW 40: CPT | Performed by: INTERNAL MEDICINE

## 2021-01-01 RX ORDER — QUETIAPINE FUMARATE 25 MG/1
25 TABLET, FILM COATED ORAL
Status: DISCONTINUED | OUTPATIENT
Start: 2021-01-01 | End: 2021-01-01 | Stop reason: HOSPADM

## 2021-01-01 RX ORDER — MAGNESIUM SULFATE 100 %
4 CRYSTALS MISCELLANEOUS AS NEEDED
Status: DISCONTINUED | OUTPATIENT
Start: 2021-01-01 | End: 2021-01-01 | Stop reason: HOSPADM

## 2021-01-01 RX ORDER — DEXTROSE 50 % IN WATER (D50W) INTRAVENOUS SYRINGE
25-50 AS NEEDED
Status: DISCONTINUED | OUTPATIENT
Start: 2021-01-01 | End: 2021-01-01 | Stop reason: HOSPADM

## 2021-01-01 RX ORDER — INSULIN LISPRO 100 [IU]/ML
3 INJECTION, SOLUTION INTRAVENOUS; SUBCUTANEOUS
Status: DISCONTINUED | OUTPATIENT
Start: 2021-01-01 | End: 2021-01-01

## 2021-01-01 RX ORDER — LEVOTHYROXINE SODIUM 88 UG/1
88 TABLET ORAL
Status: DISCONTINUED | OUTPATIENT
Start: 2021-01-01 | End: 2021-01-01 | Stop reason: HOSPADM

## 2021-01-01 RX ORDER — MAGNESIUM SULFATE HEPTAHYDRATE 40 MG/ML
2 INJECTION, SOLUTION INTRAVENOUS ONCE
Status: COMPLETED | OUTPATIENT
Start: 2021-01-01 | End: 2021-01-01

## 2021-01-01 RX ORDER — DEXAMETHASONE SODIUM PHOSPHATE 4 MG/ML
6 INJECTION, SOLUTION INTRA-ARTICULAR; INTRALESIONAL; INTRAMUSCULAR; INTRAVENOUS; SOFT TISSUE EVERY 12 HOURS
Status: DISCONTINUED | OUTPATIENT
Start: 2021-01-01 | End: 2021-01-01

## 2021-01-01 RX ORDER — POTASSIUM CHLORIDE 7.45 MG/ML
10 INJECTION INTRAVENOUS
Status: COMPLETED | OUTPATIENT
Start: 2021-01-01 | End: 2021-01-01

## 2021-01-01 RX ORDER — PROPOFOL 10 MG/ML
0-50 VIAL (ML) INTRAVENOUS
Status: DISCONTINUED | OUTPATIENT
Start: 2021-01-01 | End: 2021-01-01 | Stop reason: HOSPADM

## 2021-01-01 RX ORDER — IMIPRAMINE HYDROCHLORIDE 25 MG/1
50 TABLET ORAL
Status: DISCONTINUED | OUTPATIENT
Start: 2021-01-01 | End: 2021-01-01

## 2021-01-01 RX ORDER — SODIUM CHLORIDE AND POTASSIUM CHLORIDE .9; .15 G/100ML; G/100ML
SOLUTION INTRAVENOUS CONTINUOUS
Status: DISPENSED | OUTPATIENT
Start: 2021-01-01 | End: 2021-01-01

## 2021-01-01 RX ORDER — FUROSEMIDE 10 MG/ML
40 INJECTION INTRAMUSCULAR; INTRAVENOUS DAILY
Status: DISCONTINUED | OUTPATIENT
Start: 2021-01-01 | End: 2021-01-01

## 2021-01-01 RX ORDER — SODIUM CHLORIDE 9 MG/ML
125 INJECTION, SOLUTION INTRAVENOUS CONTINUOUS
Status: DISCONTINUED | OUTPATIENT
Start: 2021-01-01 | End: 2021-01-01

## 2021-01-01 RX ORDER — LISINOPRIL AND HYDROCHLOROTHIAZIDE 10; 12.5 MG/1; MG/1
1 TABLET ORAL DAILY
Status: DISCONTINUED | OUTPATIENT
Start: 2021-01-01 | End: 2021-01-01 | Stop reason: HOSPADM

## 2021-01-01 RX ORDER — MAGNESIUM SULFATE 100 %
4 CRYSTALS MISCELLANEOUS AS NEEDED
Status: DISCONTINUED | OUTPATIENT
Start: 2021-01-01 | End: 2021-01-01 | Stop reason: SDUPTHER

## 2021-01-01 RX ORDER — SODIUM CHLORIDE 9 MG/ML
50 INJECTION, SOLUTION INTRAVENOUS CONTINUOUS
Status: DISCONTINUED | OUTPATIENT
Start: 2021-01-01 | End: 2021-01-01

## 2021-01-01 RX ORDER — CYCLOBENZAPRINE HCL 10 MG
10 TABLET ORAL 3 TIMES DAILY
Status: DISCONTINUED | OUTPATIENT
Start: 2021-01-01 | End: 2021-01-01

## 2021-01-01 RX ORDER — DEXAMETHASONE SODIUM PHOSPHATE 4 MG/ML
6 INJECTION, SOLUTION INTRA-ARTICULAR; INTRALESIONAL; INTRAMUSCULAR; INTRAVENOUS; SOFT TISSUE EVERY 8 HOURS
Status: DISPENSED | OUTPATIENT
Start: 2021-01-01 | End: 2021-01-01

## 2021-01-01 RX ORDER — NYSTATIN 100000 [USP'U]/ML
500000 SUSPENSION ORAL 4 TIMES DAILY
Status: DISCONTINUED | OUTPATIENT
Start: 2021-01-01 | End: 2021-01-01 | Stop reason: HOSPADM

## 2021-01-01 RX ORDER — PROPOFOL 10 MG/ML
INJECTION, EMULSION INTRAVENOUS
Status: COMPLETED
Start: 2021-01-01 | End: 2021-01-01

## 2021-01-01 RX ORDER — POTASSIUM CHLORIDE 20 MEQ/1
60 TABLET, EXTENDED RELEASE ORAL
Status: COMPLETED | OUTPATIENT
Start: 2021-01-01 | End: 2021-01-01

## 2021-01-01 RX ORDER — POTASSIUM CHLORIDE 1.5 G/1.77G
20 POWDER, FOR SOLUTION ORAL ONCE
Status: ACTIVE | OUTPATIENT
Start: 2021-01-01 | End: 2021-01-01

## 2021-01-01 RX ORDER — DEXAMETHASONE SODIUM PHOSPHATE 100 MG/10ML
10 INJECTION INTRAMUSCULAR; INTRAVENOUS
Status: DISPENSED | OUTPATIENT
Start: 2021-01-01 | End: 2021-01-01

## 2021-01-01 RX ORDER — MORPHINE SULFATE 2 MG/ML
2 INJECTION, SOLUTION INTRAMUSCULAR; INTRAVENOUS
Status: DISCONTINUED | OUTPATIENT
Start: 2021-01-01 | End: 2021-01-01 | Stop reason: HOSPADM

## 2021-01-01 RX ORDER — ENOXAPARIN SODIUM 100 MG/ML
1 INJECTION SUBCUTANEOUS EVERY 12 HOURS
Status: DISCONTINUED | OUTPATIENT
Start: 2021-01-01 | End: 2021-01-01 | Stop reason: HOSPADM

## 2021-01-01 RX ORDER — POTASSIUM CHLORIDE 20 MEQ/1
80 TABLET, EXTENDED RELEASE ORAL
Status: COMPLETED | OUTPATIENT
Start: 2021-01-01 | End: 2021-01-01

## 2021-01-01 RX ORDER — ENOXAPARIN SODIUM 100 MG/ML
40 INJECTION SUBCUTANEOUS EVERY 24 HOURS
Status: DISCONTINUED | OUTPATIENT
Start: 2021-01-01 | End: 2021-01-01

## 2021-01-01 RX ORDER — CHOLECALCIFEROL (VITAMIN D3) 125 MCG
5 CAPSULE ORAL
Status: DISCONTINUED | OUTPATIENT
Start: 2021-01-01 | End: 2021-01-01 | Stop reason: HOSPADM

## 2021-01-01 RX ORDER — SODIUM BICARBONATE 1 MEQ/ML
50 SYRINGE (ML) INTRAVENOUS
Status: COMPLETED | OUTPATIENT
Start: 2021-01-01 | End: 2021-01-01

## 2021-01-01 RX ORDER — ZINC SULFATE 50(220)MG
1 CAPSULE ORAL DAILY
Status: DISCONTINUED | OUTPATIENT
Start: 2021-01-01 | End: 2021-01-01

## 2021-01-01 RX ORDER — QUETIAPINE FUMARATE 50 MG/1
25 TABLET, EXTENDED RELEASE ORAL DAILY
Status: DISCONTINUED | OUTPATIENT
Start: 2021-01-01 | End: 2021-01-01

## 2021-01-01 RX ORDER — HYDROXYZINE PAMOATE 25 MG/1
25 CAPSULE ORAL
Status: DISCONTINUED | OUTPATIENT
Start: 2021-01-01 | End: 2021-01-01 | Stop reason: HOSPADM

## 2021-01-01 RX ORDER — SODIUM CHLORIDE 9 MG/ML
250 INJECTION, SOLUTION INTRAVENOUS AS NEEDED
Status: DISCONTINUED | OUTPATIENT
Start: 2021-01-01 | End: 2021-01-01 | Stop reason: HOSPADM

## 2021-01-01 RX ORDER — QUETIAPINE FUMARATE 25 MG/1
12.5 TABLET, FILM COATED ORAL
Status: DISCONTINUED | OUTPATIENT
Start: 2021-01-01 | End: 2021-01-01

## 2021-01-01 RX ORDER — LORAZEPAM 2 MG/ML
1 INJECTION INTRAMUSCULAR ONCE
Status: COMPLETED | OUTPATIENT
Start: 2021-01-01 | End: 2021-01-01

## 2021-01-01 RX ORDER — SODIUM BICARBONATE 1 MEQ/ML
SYRINGE (ML) INTRAVENOUS
Status: COMPLETED
Start: 2021-01-01 | End: 2021-01-01

## 2021-01-01 RX ORDER — INSULIN LISPRO 100 [IU]/ML
INJECTION, SOLUTION INTRAVENOUS; SUBCUTANEOUS
Status: DISCONTINUED | OUTPATIENT
Start: 2021-01-01 | End: 2021-01-01 | Stop reason: HOSPADM

## 2021-01-01 RX ORDER — LORAZEPAM 2 MG/ML
0.5 INJECTION INTRAMUSCULAR ONCE
Status: COMPLETED | OUTPATIENT
Start: 2021-01-01 | End: 2021-01-01

## 2021-01-01 RX ORDER — SODIUM BICARBONATE 1 MEQ/ML
150 SYRINGE (ML) INTRAVENOUS ONCE
Status: COMPLETED | OUTPATIENT
Start: 2021-01-01 | End: 2021-01-01

## 2021-01-01 RX ORDER — DEXAMETHASONE 4 MG/1
4 TABLET ORAL DAILY
Status: DISCONTINUED | OUTPATIENT
Start: 2021-01-01 | End: 2021-01-01

## 2021-01-01 RX ORDER — SODIUM BICARBONATE 1 MEQ/ML
50 SYRINGE (ML) INTRAVENOUS ONCE
Status: DISCONTINUED | OUTPATIENT
Start: 2021-01-01 | End: 2021-01-01

## 2021-01-01 RX ORDER — LORAZEPAM 2 MG/ML
1 INJECTION INTRAMUSCULAR
Status: ACTIVE | OUTPATIENT
Start: 2021-01-01 | End: 2021-01-01

## 2021-01-01 RX ORDER — LORAZEPAM 2 MG/ML
1 INJECTION INTRAMUSCULAR
Status: DISCONTINUED | OUTPATIENT
Start: 2021-01-01 | End: 2021-01-01 | Stop reason: HOSPADM

## 2021-01-01 RX ORDER — ATORVASTATIN CALCIUM 10 MG/1
10 TABLET, FILM COATED ORAL DAILY
Status: DISCONTINUED | OUTPATIENT
Start: 2021-01-01 | End: 2021-01-01 | Stop reason: HOSPADM

## 2021-01-01 RX ORDER — ACETAMINOPHEN 325 MG/1
650 TABLET ORAL
Status: DISCONTINUED | OUTPATIENT
Start: 2021-01-01 | End: 2021-01-01 | Stop reason: HOSPADM

## 2021-01-01 RX ORDER — MELOXICAM 7.5 MG/1
7.5 TABLET ORAL DAILY
Status: DISCONTINUED | OUTPATIENT
Start: 2021-01-01 | End: 2021-01-01 | Stop reason: HOSPADM

## 2021-01-01 RX ORDER — MAG HYDROX/ALUMINUM HYD/SIMETH 200-200-20
30 SUSPENSION, ORAL (FINAL DOSE FORM) ORAL
Status: DISCONTINUED | OUTPATIENT
Start: 2021-01-01 | End: 2021-01-01 | Stop reason: HOSPADM

## 2021-01-01 RX ORDER — FUROSEMIDE 10 MG/ML
40 INJECTION INTRAMUSCULAR; INTRAVENOUS 2 TIMES DAILY
Status: DISCONTINUED | OUTPATIENT
Start: 2021-01-01 | End: 2021-01-01 | Stop reason: HOSPADM

## 2021-01-01 RX ORDER — SERTRALINE HYDROCHLORIDE 50 MG/1
100 TABLET, FILM COATED ORAL DAILY
Status: DISCONTINUED | OUTPATIENT
Start: 2021-01-01 | End: 2021-01-01

## 2021-01-01 RX ORDER — PANTOPRAZOLE SODIUM 20 MG/1
20 TABLET, DELAYED RELEASE ORAL DAILY
Status: DISCONTINUED | OUTPATIENT
Start: 2021-01-01 | End: 2021-01-01

## 2021-01-01 RX ORDER — CYCLOBENZAPRINE HCL 10 MG
10 TABLET ORAL 3 TIMES DAILY
Status: CANCELLED | OUTPATIENT
Start: 2021-01-01

## 2021-01-01 RX ORDER — POTASSIUM CHLORIDE 1.5 G/1.77G
40 POWDER, FOR SOLUTION ORAL EVERY 4 HOURS
Status: COMPLETED | OUTPATIENT
Start: 2021-01-01 | End: 2021-01-01

## 2021-01-01 RX ORDER — FUROSEMIDE 10 MG/ML
40 INJECTION INTRAMUSCULAR; INTRAVENOUS 2 TIMES DAILY
Status: DISCONTINUED | OUTPATIENT
Start: 2021-01-01 | End: 2021-01-01

## 2021-01-01 RX ORDER — INSULIN LISPRO 100 [IU]/ML
INJECTION, SOLUTION INTRAVENOUS; SUBCUTANEOUS
Status: DISCONTINUED | OUTPATIENT
Start: 2021-01-01 | End: 2021-01-01

## 2021-01-01 RX ORDER — NOREPINEPHRINE BITARTRATE/D5W 8 MG/250ML
.5-3 PLASTIC BAG, INJECTION (ML) INTRAVENOUS
Status: DISCONTINUED | OUTPATIENT
Start: 2021-01-01 | End: 2021-01-01 | Stop reason: HOSPADM

## 2021-01-01 RX ORDER — DEXAMETHASONE SODIUM PHOSPHATE 4 MG/ML
6 INJECTION, SOLUTION INTRA-ARTICULAR; INTRALESIONAL; INTRAMUSCULAR; INTRAVENOUS; SOFT TISSUE EVERY 24 HOURS
Status: COMPLETED | OUTPATIENT
Start: 2021-01-01 | End: 2021-01-01

## 2021-01-01 RX ORDER — GUAIFENESIN 600 MG/1
600 TABLET, EXTENDED RELEASE ORAL EVERY 12 HOURS
Status: DISCONTINUED | OUTPATIENT
Start: 2021-01-01 | End: 2021-01-01

## 2021-01-01 RX ORDER — IPRATROPIUM BROMIDE AND ALBUTEROL SULFATE 2.5; .5 MG/3ML; MG/3ML
3 SOLUTION RESPIRATORY (INHALATION)
Status: DISCONTINUED | OUTPATIENT
Start: 2021-01-01 | End: 2021-01-01 | Stop reason: HOSPADM

## 2021-01-01 RX ORDER — FAMOTIDINE 20 MG/1
20 TABLET, FILM COATED ORAL DAILY
Status: DISCONTINUED | OUTPATIENT
Start: 2021-01-01 | End: 2021-01-01

## 2021-01-01 RX ORDER — ONDANSETRON 2 MG/ML
4 INJECTION INTRAMUSCULAR; INTRAVENOUS
Status: DISCONTINUED | OUTPATIENT
Start: 2021-01-01 | End: 2021-01-01 | Stop reason: HOSPADM

## 2021-01-01 RX ORDER — MAGNESIUM SULFATE 1 G/100ML
INJECTION INTRAVENOUS
Status: DISPENSED
Start: 2021-01-01 | End: 2021-01-01

## 2021-01-01 RX ORDER — SODIUM CHLORIDE 9 MG/ML
500 INJECTION, SOLUTION INTRAVENOUS CONTINUOUS
Status: DISCONTINUED | OUTPATIENT
Start: 2021-01-01 | End: 2021-01-01

## 2021-01-01 RX ORDER — MORPHINE SULFATE 2 MG/ML
5 INJECTION, SOLUTION INTRAMUSCULAR; INTRAVENOUS ONCE
Status: COMPLETED | OUTPATIENT
Start: 2021-01-01 | End: 2021-01-01

## 2021-01-01 RX ORDER — SODIUM BICARBONATE 1 MEQ/ML
100 SYRINGE (ML) INTRAVENOUS ONCE
Status: COMPLETED | OUTPATIENT
Start: 2021-01-01 | End: 2021-01-01

## 2021-01-01 RX ADMIN — ENOXAPARIN SODIUM 90 MG: 100 INJECTION SUBCUTANEOUS at 23:53

## 2021-01-01 RX ADMIN — PIPERACILLIN AND TAZOBACTAM 3.38 G: 3; .375 INJECTION, POWDER, LYOPHILIZED, FOR SOLUTION INTRAVENOUS at 18:14

## 2021-01-01 RX ADMIN — HYDROXYZINE PAMOATE 25 MG: 25 CAPSULE ORAL at 22:12

## 2021-01-01 RX ADMIN — NYSTATIN 500000 UNITS: 100000 SUSPENSION ORAL at 20:53

## 2021-01-01 RX ADMIN — CYCLOBENZAPRINE 10 MG: 10 TABLET, FILM COATED ORAL at 09:52

## 2021-01-01 RX ADMIN — GUAIFENESIN 600 MG: 600 TABLET, EXTENDED RELEASE ORAL at 21:20

## 2021-01-01 RX ADMIN — SERTRALINE HYDROCHLORIDE 100 MG: 50 TABLET ORAL at 10:46

## 2021-01-01 RX ADMIN — IMIPRAMINE HYDROCHLORIDE 50 MG: 25 TABLET ORAL at 21:45

## 2021-01-01 RX ADMIN — QUETIAPINE FUMARATE 12.5 MG: 25 TABLET, FILM COATED ORAL at 20:21

## 2021-01-01 RX ADMIN — Medication: at 23:40

## 2021-01-01 RX ADMIN — SERTRALINE HYDROCHLORIDE 100 MG: 50 TABLET ORAL at 08:49

## 2021-01-01 RX ADMIN — AZITHROMYCIN DIHYDRATE 500 MG: 500 INJECTION, POWDER, LYOPHILIZED, FOR SOLUTION INTRAVENOUS at 17:44

## 2021-01-01 RX ADMIN — Medication: at 20:53

## 2021-01-01 RX ADMIN — INSULIN LISPRO 2 UNITS: 100 INJECTION, SOLUTION INTRAVENOUS; SUBCUTANEOUS at 11:30

## 2021-01-01 RX ADMIN — INSULIN LISPRO 2 UNITS: 100 INJECTION, SOLUTION INTRAVENOUS; SUBCUTANEOUS at 11:56

## 2021-01-01 RX ADMIN — ENOXAPARIN SODIUM 40 MG: 40 INJECTION SUBCUTANEOUS at 18:00

## 2021-01-01 RX ADMIN — REMDESIVIR 100 MG: 100 INJECTION, POWDER, LYOPHILIZED, FOR SOLUTION INTRAVENOUS at 22:47

## 2021-01-01 RX ADMIN — SODIUM BICARBONATE: 84 INJECTION, SOLUTION INTRAVENOUS at 11:00

## 2021-01-01 RX ADMIN — INSULIN LISPRO 2 UNITS: 100 INJECTION, SOLUTION INTRAVENOUS; SUBCUTANEOUS at 08:41

## 2021-01-01 RX ADMIN — INSULIN LISPRO 3 UNITS: 100 INJECTION, SOLUTION INTRAVENOUS; SUBCUTANEOUS at 12:16

## 2021-01-01 RX ADMIN — PIPERACILLIN AND TAZOBACTAM 3.38 G: 3; .375 INJECTION, POWDER, LYOPHILIZED, FOR SOLUTION INTRAVENOUS at 06:42

## 2021-01-01 RX ADMIN — SERTRALINE HYDROCHLORIDE 100 MG: 50 TABLET ORAL at 12:12

## 2021-01-01 RX ADMIN — ENOXAPARIN SODIUM 90 MG: 100 INJECTION SUBCUTANEOUS at 22:18

## 2021-01-01 RX ADMIN — CYCLOBENZAPRINE 10 MG: 10 TABLET, FILM COATED ORAL at 21:17

## 2021-01-01 RX ADMIN — PIPERACILLIN AND TAZOBACTAM 3.38 G: 3; .375 INJECTION, POWDER, LYOPHILIZED, FOR SOLUTION INTRAVENOUS at 18:42

## 2021-01-01 RX ADMIN — Medication 5 MG: at 22:05

## 2021-01-01 RX ADMIN — PIPERACILLIN AND TAZOBACTAM 3.38 G: 3; .375 INJECTION, POWDER, LYOPHILIZED, FOR SOLUTION INTRAVENOUS at 18:05

## 2021-01-01 RX ADMIN — PIPERACILLIN AND TAZOBACTAM 3.38 G: 3; .375 INJECTION, POWDER, LYOPHILIZED, FOR SOLUTION INTRAVENOUS at 11:33

## 2021-01-01 RX ADMIN — HYDROXYZINE PAMOATE 25 MG: 25 CAPSULE ORAL at 20:51

## 2021-01-01 RX ADMIN — AZITHROMYCIN DIHYDRATE 500 MG: 500 INJECTION, POWDER, LYOPHILIZED, FOR SOLUTION INTRAVENOUS at 16:49

## 2021-01-01 RX ADMIN — FAMOTIDINE 20 MG: 20 TABLET ORAL at 21:17

## 2021-01-01 RX ADMIN — DEXAMETHASONE SODIUM PHOSPHATE 6 MG: 4 INJECTION, SOLUTION INTRA-ARTICULAR; INTRALESIONAL; INTRAMUSCULAR; INTRAVENOUS; SOFT TISSUE at 22:30

## 2021-01-01 RX ADMIN — CYCLOBENZAPRINE 10 MG: 10 TABLET, FILM COATED ORAL at 15:34

## 2021-01-01 RX ADMIN — CYCLOBENZAPRINE 10 MG: 10 TABLET, FILM COATED ORAL at 08:42

## 2021-01-01 RX ADMIN — Medication 1 CAPSULE: at 10:36

## 2021-01-01 RX ADMIN — ENOXAPARIN SODIUM 40 MG: 40 INJECTION SUBCUTANEOUS at 17:08

## 2021-01-01 RX ADMIN — ATORVASTATIN CALCIUM 10 MG: 10 TABLET, FILM COATED ORAL at 08:42

## 2021-01-01 RX ADMIN — ENOXAPARIN SODIUM 90 MG: 100 INJECTION SUBCUTANEOUS at 18:19

## 2021-01-01 RX ADMIN — ATORVASTATIN CALCIUM 10 MG: 10 TABLET, FILM COATED ORAL at 08:16

## 2021-01-01 RX ADMIN — NYSTATIN 500000 UNITS: 100000 SUSPENSION ORAL at 21:49

## 2021-01-01 RX ADMIN — CYCLOBENZAPRINE 10 MG: 10 TABLET, FILM COATED ORAL at 22:25

## 2021-01-01 RX ADMIN — CYCLOBENZAPRINE 10 MG: 10 TABLET, FILM COATED ORAL at 17:11

## 2021-01-01 RX ADMIN — DEXAMETHASONE SODIUM PHOSPHATE 6 MG: 4 INJECTION, SOLUTION INTRA-ARTICULAR; INTRALESIONAL; INTRAMUSCULAR; INTRAVENOUS; SOFT TISSUE at 22:48

## 2021-01-01 RX ADMIN — ENOXAPARIN SODIUM 90 MG: 100 INJECTION SUBCUTANEOUS at 11:08

## 2021-01-01 RX ADMIN — NYSTATIN 500000 UNITS: 100000 SUSPENSION ORAL at 22:12

## 2021-01-01 RX ADMIN — PIPERACILLIN AND TAZOBACTAM 3.38 G: 3; .375 INJECTION, POWDER, LYOPHILIZED, FOR SOLUTION INTRAVENOUS at 02:00

## 2021-01-01 RX ADMIN — LISINOPRIL AND HYDROCHLOROTHIAZIDE 1 TABLET: 12.5; 1 TABLET ORAL at 09:00

## 2021-01-01 RX ADMIN — FUROSEMIDE 40 MG: 10 INJECTION, SOLUTION INTRAMUSCULAR; INTRAVENOUS at 22:09

## 2021-01-01 RX ADMIN — CYCLOBENZAPRINE 10 MG: 10 TABLET, FILM COATED ORAL at 12:12

## 2021-01-01 RX ADMIN — ENOXAPARIN SODIUM 90 MG: 100 INJECTION SUBCUTANEOUS at 23:11

## 2021-01-01 RX ADMIN — PHENYLEPHRINE HYDROCHLORIDE 250 MCG/MIN: 10 INJECTION INTRAVENOUS at 12:03

## 2021-01-01 RX ADMIN — Medication: at 09:15

## 2021-01-01 RX ADMIN — INSULIN LISPRO 3 UNITS: 100 INJECTION, SOLUTION INTRAVENOUS; SUBCUTANEOUS at 17:02

## 2021-01-01 RX ADMIN — CYCLOBENZAPRINE 10 MG: 10 TABLET, FILM COATED ORAL at 21:03

## 2021-01-01 RX ADMIN — DEXAMETHASONE SODIUM PHOSPHATE 6 MG: 4 INJECTION, SOLUTION INTRA-ARTICULAR; INTRALESIONAL; INTRAMUSCULAR; INTRAVENOUS; SOFT TISSUE at 23:55

## 2021-01-01 RX ADMIN — ATORVASTATIN CALCIUM 10 MG: 10 TABLET, FILM COATED ORAL at 08:33

## 2021-01-01 RX ADMIN — NYSTATIN 500000 UNITS: 100000 SUSPENSION ORAL at 21:20

## 2021-01-01 RX ADMIN — GUAIFENESIN 600 MG: 600 TABLET, EXTENDED RELEASE ORAL at 08:23

## 2021-01-01 RX ADMIN — CYCLOBENZAPRINE 10 MG: 10 TABLET, FILM COATED ORAL at 16:50

## 2021-01-01 RX ADMIN — FAMOTIDINE 20 MG: 20 TABLET ORAL at 08:02

## 2021-01-01 RX ADMIN — HYDROXYZINE PAMOATE 25 MG: 25 CAPSULE ORAL at 22:19

## 2021-01-01 RX ADMIN — DEXAMETHASONE SODIUM PHOSPHATE 6 MG: 4 INJECTION, SOLUTION INTRA-ARTICULAR; INTRALESIONAL; INTRAMUSCULAR; INTRAVENOUS; SOFT TISSUE at 18:19

## 2021-01-01 RX ADMIN — QUETIAPINE FUMARATE 12.5 MG: 25 TABLET, FILM COATED ORAL at 20:39

## 2021-01-01 RX ADMIN — SERTRALINE HYDROCHLORIDE 100 MG: 50 TABLET ORAL at 09:14

## 2021-01-01 RX ADMIN — NYSTATIN 500000 UNITS: 100000 SUSPENSION ORAL at 18:14

## 2021-01-01 RX ADMIN — DEXAMETHASONE SODIUM PHOSPHATE 6 MG: 4 INJECTION, SOLUTION INTRA-ARTICULAR; INTRALESIONAL; INTRAMUSCULAR; INTRAVENOUS; SOFT TISSUE at 14:10

## 2021-01-01 RX ADMIN — NYSTATIN 500000 UNITS: 100000 SUSPENSION ORAL at 22:05

## 2021-01-01 RX ADMIN — FUROSEMIDE 40 MG: 10 INJECTION, SOLUTION INTRAMUSCULAR; INTRAVENOUS at 21:16

## 2021-01-01 RX ADMIN — CYCLOBENZAPRINE 10 MG: 10 TABLET, FILM COATED ORAL at 23:56

## 2021-01-01 RX ADMIN — ENOXAPARIN SODIUM 40 MG: 40 INJECTION SUBCUTANEOUS at 17:44

## 2021-01-01 RX ADMIN — POTASSIUM CHLORIDE 60 MEQ: 1500 TABLET, EXTENDED RELEASE ORAL at 12:51

## 2021-01-01 RX ADMIN — INSULIN LISPRO 3 UNITS: 100 INJECTION, SOLUTION INTRAVENOUS; SUBCUTANEOUS at 16:24

## 2021-01-01 RX ADMIN — PROPOFOL 30 MCG/KG/MIN: 10 INJECTION, EMULSION INTRAVENOUS at 04:30

## 2021-01-01 RX ADMIN — Medication: at 09:30

## 2021-01-01 RX ADMIN — NYSTATIN 500000 UNITS: 100000 SUSPENSION ORAL at 08:33

## 2021-01-01 RX ADMIN — LORAZEPAM 1 MG: 2 INJECTION INTRAMUSCULAR; INTRAVENOUS at 07:49

## 2021-01-01 RX ADMIN — NYSTATIN 500000 UNITS: 100000 SUSPENSION ORAL at 18:00

## 2021-01-01 RX ADMIN — PIPERACILLIN AND TAZOBACTAM 3.38 G: 3; .375 INJECTION, POWDER, LYOPHILIZED, FOR SOLUTION INTRAVENOUS at 02:30

## 2021-01-01 RX ADMIN — ACETAMINOPHEN 650 MG: 325 TABLET, FILM COATED ORAL at 23:52

## 2021-01-01 RX ADMIN — PANTOPRAZOLE SODIUM 20 MG: 20 TABLET, DELAYED RELEASE ORAL at 08:01

## 2021-01-01 RX ADMIN — NYSTATIN 500000 UNITS: 100000 SUSPENSION ORAL at 23:41

## 2021-01-01 RX ADMIN — REMDESIVIR 100 MG: 100 INJECTION, POWDER, LYOPHILIZED, FOR SOLUTION INTRAVENOUS at 23:00

## 2021-01-01 RX ADMIN — GUAIFENESIN 600 MG: 600 TABLET, EXTENDED RELEASE ORAL at 21:14

## 2021-01-01 RX ADMIN — FUROSEMIDE 40 MG: 10 INJECTION, SOLUTION INTRAMUSCULAR; INTRAVENOUS at 09:10

## 2021-01-01 RX ADMIN — ENOXAPARIN SODIUM 40 MG: 40 INJECTION SUBCUTANEOUS at 17:10

## 2021-01-01 RX ADMIN — DEXAMETHASONE SODIUM PHOSPHATE 6 MG: 4 INJECTION, SOLUTION INTRA-ARTICULAR; INTRALESIONAL; INTRAMUSCULAR; INTRAVENOUS; SOFT TISSUE at 21:42

## 2021-01-01 RX ADMIN — CYCLOBENZAPRINE 10 MG: 10 TABLET, FILM COATED ORAL at 21:20

## 2021-01-01 RX ADMIN — QUETIAPINE FUMARATE 12.5 MG: 25 TABLET, FILM COATED ORAL at 20:34

## 2021-01-01 RX ADMIN — PIPERACILLIN AND TAZOBACTAM 3.38 G: 3; .375 INJECTION, POWDER, LYOPHILIZED, FOR SOLUTION INTRAVENOUS at 12:31

## 2021-01-01 RX ADMIN — NYSTATIN 500000 UNITS: 100000 SUSPENSION ORAL at 13:00

## 2021-01-01 RX ADMIN — NYSTATIN 500000 UNITS: 100000 SUSPENSION ORAL at 21:14

## 2021-01-01 RX ADMIN — NYSTATIN 500000 UNITS: 100000 SUSPENSION ORAL at 17:08

## 2021-01-01 RX ADMIN — FAMOTIDINE 20 MG: 20 TABLET ORAL at 08:50

## 2021-01-01 RX ADMIN — INSULIN LISPRO 3 UNITS: 100 INJECTION, SOLUTION INTRAVENOUS; SUBCUTANEOUS at 09:46

## 2021-01-01 RX ADMIN — FUROSEMIDE 40 MG: 10 INJECTION, SOLUTION INTRAMUSCULAR; INTRAVENOUS at 21:14

## 2021-01-01 RX ADMIN — LEVOTHYROXINE SODIUM 88 MCG: 0.09 TABLET ORAL at 08:53

## 2021-01-01 RX ADMIN — IMIPRAMINE HYDROCHLORIDE 50 MG: 25 TABLET ORAL at 20:43

## 2021-01-01 RX ADMIN — Medication 120 MCG/MIN: at 10:01

## 2021-01-01 RX ADMIN — PANTOPRAZOLE SODIUM 20 MG: 20 TABLET, DELAYED RELEASE ORAL at 08:07

## 2021-01-01 RX ADMIN — FUROSEMIDE 40 MG: 10 INJECTION, SOLUTION INTRAMUSCULAR; INTRAVENOUS at 14:01

## 2021-01-01 RX ADMIN — CYCLOBENZAPRINE 10 MG: 10 TABLET, FILM COATED ORAL at 09:19

## 2021-01-01 RX ADMIN — ENOXAPARIN SODIUM 40 MG: 40 INJECTION SUBCUTANEOUS at 16:50

## 2021-01-01 RX ADMIN — IMIPRAMINE HYDROCHLORIDE 50 MG: 25 TABLET ORAL at 22:47

## 2021-01-01 RX ADMIN — NYSTATIN 500000 UNITS: 100000 SUSPENSION ORAL at 13:17

## 2021-01-01 RX ADMIN — ATORVASTATIN CALCIUM 10 MG: 10 TABLET, FILM COATED ORAL at 09:48

## 2021-01-01 RX ADMIN — CYCLOBENZAPRINE 10 MG: 10 TABLET, FILM COATED ORAL at 21:49

## 2021-01-01 RX ADMIN — PIPERACILLIN AND TAZOBACTAM 3.38 G: 3; .375 INJECTION, POWDER, LYOPHILIZED, FOR SOLUTION INTRAVENOUS at 09:47

## 2021-01-01 RX ADMIN — LEVOTHYROXINE SODIUM 88 MCG: 0.09 TABLET ORAL at 09:28

## 2021-01-01 RX ADMIN — NYSTATIN 500000 UNITS: 100000 SUSPENSION ORAL at 08:06

## 2021-01-01 RX ADMIN — Medication 30 MCG/MIN: at 21:00

## 2021-01-01 RX ADMIN — INSULIN LISPRO 2 UNITS: 100 INJECTION, SOLUTION INTRAVENOUS; SUBCUTANEOUS at 11:51

## 2021-01-01 RX ADMIN — NYSTATIN 500000 UNITS: 100000 SUSPENSION ORAL at 09:30

## 2021-01-01 RX ADMIN — ATORVASTATIN CALCIUM 10 MG: 10 TABLET, FILM COATED ORAL at 08:01

## 2021-01-01 RX ADMIN — CYCLOBENZAPRINE 10 MG: 10 TABLET, FILM COATED ORAL at 21:43

## 2021-01-01 RX ADMIN — ENOXAPARIN SODIUM 40 MG: 40 INJECTION SUBCUTANEOUS at 17:47

## 2021-01-01 RX ADMIN — DEXAMETHASONE SODIUM PHOSPHATE 6 MG: 4 INJECTION, SOLUTION INTRA-ARTICULAR; INTRALESIONAL; INTRAMUSCULAR; INTRAVENOUS; SOFT TISSUE at 18:06

## 2021-01-01 RX ADMIN — NYSTATIN 500000 UNITS: 100000 SUSPENSION ORAL at 21:03

## 2021-01-01 RX ADMIN — PANTOPRAZOLE SODIUM 20 MG: 20 TABLET, DELAYED RELEASE ORAL at 08:12

## 2021-01-01 RX ADMIN — NYSTATIN 500000 UNITS: 100000 SUSPENSION ORAL at 17:48

## 2021-01-01 RX ADMIN — NYSTATIN 500000 UNITS: 100000 SUSPENSION ORAL at 17:06

## 2021-01-01 RX ADMIN — INSULIN LISPRO 3 UNITS: 100 INJECTION, SOLUTION INTRAVENOUS; SUBCUTANEOUS at 08:06

## 2021-01-01 RX ADMIN — CYCLOBENZAPRINE 10 MG: 10 TABLET, FILM COATED ORAL at 09:28

## 2021-01-01 RX ADMIN — CYCLOBENZAPRINE 10 MG: 10 TABLET, FILM COATED ORAL at 18:01

## 2021-01-01 RX ADMIN — PANTOPRAZOLE SODIUM 20 MG: 20 TABLET, DELAYED RELEASE ORAL at 09:30

## 2021-01-01 RX ADMIN — SERTRALINE HYDROCHLORIDE 100 MG: 50 TABLET ORAL at 08:23

## 2021-01-01 RX ADMIN — Medication 120 MCG/MIN: at 06:36

## 2021-01-01 RX ADMIN — FUROSEMIDE 40 MG: 10 INJECTION, SOLUTION INTRAMUSCULAR; INTRAVENOUS at 08:33

## 2021-01-01 RX ADMIN — ENOXAPARIN SODIUM 90 MG: 100 INJECTION SUBCUTANEOUS at 12:12

## 2021-01-01 RX ADMIN — PANTOPRAZOLE SODIUM 20 MG: 20 TABLET, DELAYED RELEASE ORAL at 09:28

## 2021-01-01 RX ADMIN — INSULIN LISPRO 3 UNITS: 100 INJECTION, SOLUTION INTRAVENOUS; SUBCUTANEOUS at 09:28

## 2021-01-01 RX ADMIN — NYSTATIN 500000 UNITS: 100000 SUSPENSION ORAL at 09:10

## 2021-01-01 RX ADMIN — MELOXICAM 7.5 MG: 7.5 TABLET ORAL at 08:12

## 2021-01-01 RX ADMIN — CYCLOBENZAPRINE 10 MG: 10 TABLET, FILM COATED ORAL at 09:14

## 2021-01-01 RX ADMIN — CYCLOBENZAPRINE 10 MG: 10 TABLET, FILM COATED ORAL at 16:55

## 2021-01-01 RX ADMIN — DEXAMETHASONE SODIUM PHOSPHATE 6 MG: 4 INJECTION, SOLUTION INTRA-ARTICULAR; INTRALESIONAL; INTRAMUSCULAR; INTRAVENOUS; SOFT TISSUE at 14:01

## 2021-01-01 RX ADMIN — IMIPRAMINE HYDROCHLORIDE 50 MG: 25 TABLET ORAL at 22:00

## 2021-01-01 RX ADMIN — INSULIN LISPRO 3 UNITS: 100 INJECTION, SOLUTION INTRAVENOUS; SUBCUTANEOUS at 08:00

## 2021-01-01 RX ADMIN — Medication: at 21:17

## 2021-01-01 RX ADMIN — Medication 1 CAPSULE: at 08:07

## 2021-01-01 RX ADMIN — NYSTATIN 500000 UNITS: 100000 SUSPENSION ORAL at 10:46

## 2021-01-01 RX ADMIN — PIPERACILLIN AND TAZOBACTAM 3.38 G: 3; .375 INJECTION, POWDER, LYOPHILIZED, FOR SOLUTION INTRAVENOUS at 18:28

## 2021-01-01 RX ADMIN — ALUMINUM HYDROXIDE, MAGNESIUM HYDROXIDE, AND SIMETHICONE 30 ML: 200; 200; 20 SUSPENSION ORAL at 15:07

## 2021-01-01 RX ADMIN — Medication 50 MEQ: at 21:01

## 2021-01-01 RX ADMIN — INSULIN LISPRO 3 UNITS: 100 INJECTION, SOLUTION INTRAVENOUS; SUBCUTANEOUS at 11:30

## 2021-01-01 RX ADMIN — PROPOFOL 50 MCG/KG/MIN: 10 INJECTION, EMULSION INTRAVENOUS at 23:50

## 2021-01-01 RX ADMIN — FUROSEMIDE 40 MG: 10 INJECTION, SOLUTION INTRAMUSCULAR; INTRAVENOUS at 08:48

## 2021-01-01 RX ADMIN — MELOXICAM 7.5 MG: 7.5 TABLET ORAL at 09:10

## 2021-01-01 RX ADMIN — NYSTATIN 500000 UNITS: 100000 SUSPENSION ORAL at 12:20

## 2021-01-01 RX ADMIN — FUROSEMIDE 40 MG: 10 INJECTION, SOLUTION INTRAMUSCULAR; INTRAVENOUS at 09:47

## 2021-01-01 RX ADMIN — INSULIN LISPRO 2 UNITS: 100 INJECTION, SOLUTION INTRAVENOUS; SUBCUTANEOUS at 16:57

## 2021-01-01 RX ADMIN — MELOXICAM 7.5 MG: 7.5 TABLET ORAL at 08:49

## 2021-01-01 RX ADMIN — FAMOTIDINE 20 MG: 20 TABLET ORAL at 09:30

## 2021-01-01 RX ADMIN — CYCLOBENZAPRINE 10 MG: 10 TABLET, FILM COATED ORAL at 22:05

## 2021-01-01 RX ADMIN — ACETAMINOPHEN 650 MG: 325 TABLET, FILM COATED ORAL at 13:17

## 2021-01-01 RX ADMIN — Medication 1 CAPSULE: at 09:28

## 2021-01-01 RX ADMIN — INSULIN LISPRO 3 UNITS: 100 INJECTION, SOLUTION INTRAVENOUS; SUBCUTANEOUS at 12:02

## 2021-01-01 RX ADMIN — PIPERACILLIN AND TAZOBACTAM 3.38 G: 3; .375 INJECTION, POWDER, LYOPHILIZED, FOR SOLUTION INTRAVENOUS at 18:19

## 2021-01-01 RX ADMIN — ACETAMINOPHEN 650 MG: 325 TABLET, FILM COATED ORAL at 15:07

## 2021-01-01 RX ADMIN — ENOXAPARIN SODIUM 90 MG: 100 INJECTION SUBCUTANEOUS at 11:33

## 2021-01-01 RX ADMIN — HYDROXYZINE PAMOATE 25 MG: 25 CAPSULE ORAL at 09:30

## 2021-01-01 RX ADMIN — Medication: at 10:47

## 2021-01-01 RX ADMIN — SERTRALINE HYDROCHLORIDE 100 MG: 50 TABLET ORAL at 10:34

## 2021-01-01 RX ADMIN — DEXAMETHASONE SODIUM PHOSPHATE 6 MG: 4 INJECTION, SOLUTION INTRA-ARTICULAR; INTRALESIONAL; INTRAMUSCULAR; INTRAVENOUS; SOFT TISSUE at 18:13

## 2021-01-01 RX ADMIN — NYSTATIN 500000 UNITS: 100000 SUSPENSION ORAL at 12:15

## 2021-01-01 RX ADMIN — CYCLOBENZAPRINE 10 MG: 10 TABLET, FILM COATED ORAL at 08:50

## 2021-01-01 RX ADMIN — CYCLOBENZAPRINE 10 MG: 10 TABLET, FILM COATED ORAL at 17:20

## 2021-01-01 RX ADMIN — NYSTATIN 500000 UNITS: 100000 SUSPENSION ORAL at 12:02

## 2021-01-01 RX ADMIN — NYSTATIN 500000 UNITS: 100000 SUSPENSION ORAL at 20:43

## 2021-01-01 RX ADMIN — SODIUM CHLORIDE 125 ML/HR: 9 INJECTION, SOLUTION INTRAVENOUS at 23:50

## 2021-01-01 RX ADMIN — HYDROXYZINE PAMOATE 25 MG: 25 CAPSULE ORAL at 17:20

## 2021-01-01 RX ADMIN — SODIUM BICARBONATE 100 MEQ: 84 INJECTION, SOLUTION INTRAVENOUS at 10:02

## 2021-01-01 RX ADMIN — DEXAMETHASONE SODIUM PHOSPHATE 6 MG: 4 INJECTION, SOLUTION INTRA-ARTICULAR; INTRALESIONAL; INTRAMUSCULAR; INTRAVENOUS; SOFT TISSUE at 05:11

## 2021-01-01 RX ADMIN — QUETIAPINE FUMARATE 12.5 MG: 25 TABLET, FILM COATED ORAL at 21:21

## 2021-01-01 RX ADMIN — PIPERACILLIN AND TAZOBACTAM 3.38 G: 3; .375 INJECTION, POWDER, LYOPHILIZED, FOR SOLUTION INTRAVENOUS at 09:14

## 2021-01-01 RX ADMIN — POTASSIUM CHLORIDE 10 MEQ: 7.46 INJECTION, SOLUTION INTRAVENOUS at 13:46

## 2021-01-01 RX ADMIN — ACETAMINOPHEN 650 MG: 325 TABLET, FILM COATED ORAL at 21:58

## 2021-01-01 RX ADMIN — LEVOTHYROXINE SODIUM 88 MCG: 0.09 TABLET ORAL at 08:07

## 2021-01-01 RX ADMIN — INSULIN LISPRO 3 UNITS: 100 INJECTION, SOLUTION INTRAVENOUS; SUBCUTANEOUS at 12:23

## 2021-01-01 RX ADMIN — LEVOTHYROXINE SODIUM 88 MCG: 0.09 TABLET ORAL at 09:15

## 2021-01-01 RX ADMIN — IMIPRAMINE HYDROCHLORIDE 50 MG: 25 TABLET ORAL at 21:25

## 2021-01-01 RX ADMIN — Medication: at 23:53

## 2021-01-01 RX ADMIN — VASOPRESSIN 0.04 UNITS/MIN: 20 INJECTION INTRAVENOUS at 09:58

## 2021-01-01 RX ADMIN — ENOXAPARIN SODIUM 90 MG: 100 INJECTION SUBCUTANEOUS at 11:25

## 2021-01-01 RX ADMIN — DEXAMETHASONE SODIUM PHOSPHATE 6 MG: 4 INJECTION, SOLUTION INTRA-ARTICULAR; INTRALESIONAL; INTRAMUSCULAR; INTRAVENOUS; SOFT TISSUE at 20:43

## 2021-01-01 RX ADMIN — NYSTATIN 500000 UNITS: 100000 SUSPENSION ORAL at 09:47

## 2021-01-01 RX ADMIN — NYSTATIN 500000 UNITS: 100000 SUSPENSION ORAL at 12:51

## 2021-01-01 RX ADMIN — SERTRALINE HYDROCHLORIDE 100 MG: 50 TABLET ORAL at 08:02

## 2021-01-01 RX ADMIN — NYSTATIN 500000 UNITS: 100000 SUSPENSION ORAL at 17:42

## 2021-01-01 RX ADMIN — NYSTATIN 500000 UNITS: 100000 SUSPENSION ORAL at 08:23

## 2021-01-01 RX ADMIN — GUAIFENESIN 600 MG: 600 TABLET, EXTENDED RELEASE ORAL at 21:17

## 2021-01-01 RX ADMIN — REMDESIVIR 100 MG: 100 INJECTION, POWDER, LYOPHILIZED, FOR SOLUTION INTRAVENOUS at 23:09

## 2021-01-01 RX ADMIN — CYCLOBENZAPRINE 10 MG: 10 TABLET, FILM COATED ORAL at 09:47

## 2021-01-01 RX ADMIN — PIPERACILLIN AND TAZOBACTAM 3.38 G: 3; .375 INJECTION, POWDER, LYOPHILIZED, FOR SOLUTION INTRAVENOUS at 01:03

## 2021-01-01 RX ADMIN — PANTOPRAZOLE SODIUM 20 MG: 20 TABLET, DELAYED RELEASE ORAL at 08:16

## 2021-01-01 RX ADMIN — POTASSIUM CHLORIDE 40 MEQ: 1.5 FOR SOLUTION ORAL at 11:53

## 2021-01-01 RX ADMIN — PIPERACILLIN AND TAZOBACTAM 3.38 G: 3; .375 INJECTION, POWDER, LYOPHILIZED, FOR SOLUTION INTRAVENOUS at 09:48

## 2021-01-01 RX ADMIN — IMIPRAMINE HYDROCHLORIDE 50 MG: 25 TABLET ORAL at 21:20

## 2021-01-01 RX ADMIN — AZITHROMYCIN DIHYDRATE 500 MG: 500 INJECTION, POWDER, LYOPHILIZED, FOR SOLUTION INTRAVENOUS at 16:17

## 2021-01-01 RX ADMIN — PHENYLEPHRINE HYDROCHLORIDE 400 MCG/MIN: 10 INJECTION INTRAVENOUS at 14:32

## 2021-01-01 RX ADMIN — NYSTATIN 500000 UNITS: 100000 SUSPENSION ORAL at 18:03

## 2021-01-01 RX ADMIN — MORPHINE SULFATE 5 MG: 2 INJECTION, SOLUTION INTRAMUSCULAR; INTRAVENOUS at 15:16

## 2021-01-01 RX ADMIN — Medication 5 MG: at 21:42

## 2021-01-01 RX ADMIN — GUAIFENESIN 600 MG: 600 TABLET, EXTENDED RELEASE ORAL at 22:09

## 2021-01-01 RX ADMIN — HYDROXYZINE PAMOATE 25 MG: 25 CAPSULE ORAL at 08:49

## 2021-01-01 RX ADMIN — CYCLOBENZAPRINE 10 MG: 10 TABLET, FILM COATED ORAL at 21:58

## 2021-01-01 RX ADMIN — NYSTATIN 500000 UNITS: 100000 SUSPENSION ORAL at 09:14

## 2021-01-01 RX ADMIN — ENOXAPARIN SODIUM 90 MG: 100 INJECTION SUBCUTANEOUS at 10:54

## 2021-01-01 RX ADMIN — SODIUM BICARBONATE 100 MEQ: 84 INJECTION, SOLUTION INTRAVENOUS at 10:04

## 2021-01-01 RX ADMIN — ENOXAPARIN SODIUM 90 MG: 100 INJECTION SUBCUTANEOUS at 13:48

## 2021-01-01 RX ADMIN — NYSTATIN 500000 UNITS: 100000 SUSPENSION ORAL at 17:45

## 2021-01-01 RX ADMIN — ATORVASTATIN CALCIUM 10 MG: 10 TABLET, FILM COATED ORAL at 08:12

## 2021-01-01 RX ADMIN — AZITHROMYCIN DIHYDRATE 500 MG: 500 INJECTION, POWDER, LYOPHILIZED, FOR SOLUTION INTRAVENOUS at 17:24

## 2021-01-01 RX ADMIN — NYSTATIN 500000 UNITS: 100000 SUSPENSION ORAL at 12:32

## 2021-01-01 RX ADMIN — CYCLOBENZAPRINE 10 MG: 10 TABLET, FILM COATED ORAL at 21:14

## 2021-01-01 RX ADMIN — PANTOPRAZOLE SODIUM 20 MG: 20 TABLET, DELAYED RELEASE ORAL at 12:12

## 2021-01-01 RX ADMIN — ACETAMINOPHEN 650 MG: 325 TABLET, FILM COATED ORAL at 11:21

## 2021-01-01 RX ADMIN — NYSTATIN 500000 UNITS: 100000 SUSPENSION ORAL at 13:59

## 2021-01-01 RX ADMIN — MELOXICAM 7.5 MG: 7.5 TABLET ORAL at 09:52

## 2021-01-01 RX ADMIN — LISINOPRIL AND HYDROCHLOROTHIAZIDE 1 TABLET: 12.5; 1 TABLET ORAL at 09:28

## 2021-01-01 RX ADMIN — FUROSEMIDE 40 MG: 10 INJECTION, SOLUTION INTRAMUSCULAR; INTRAVENOUS at 20:00

## 2021-01-01 RX ADMIN — SODIUM CHLORIDE 250 ML: 9 INJECTION, SOLUTION INTRAVENOUS at 04:00

## 2021-01-01 RX ADMIN — SERTRALINE HYDROCHLORIDE 100 MG: 50 TABLET ORAL at 08:30

## 2021-01-01 RX ADMIN — NYSTATIN 500000 UNITS: 100000 SUSPENSION ORAL at 21:23

## 2021-01-01 RX ADMIN — LEVOTHYROXINE SODIUM 88 MCG: 0.09 TABLET ORAL at 09:30

## 2021-01-01 RX ADMIN — CYCLOBENZAPRINE 10 MG: 10 TABLET, FILM COATED ORAL at 08:33

## 2021-01-01 RX ADMIN — DEXAMETHASONE SODIUM PHOSPHATE 6 MG: 4 INJECTION, SOLUTION INTRA-ARTICULAR; INTRALESIONAL; INTRAMUSCULAR; INTRAVENOUS; SOFT TISSUE at 23:06

## 2021-01-01 RX ADMIN — INSULIN LISPRO 2 UNITS: 100 INJECTION, SOLUTION INTRAVENOUS; SUBCUTANEOUS at 10:54

## 2021-01-01 RX ADMIN — PANTOPRAZOLE SODIUM 20 MG: 20 TABLET, DELAYED RELEASE ORAL at 08:49

## 2021-01-01 RX ADMIN — NYSTATIN 500000 UNITS: 100000 SUSPENSION ORAL at 14:01

## 2021-01-01 RX ADMIN — QUETIAPINE FUMARATE 12.5 MG: 25 TABLET, FILM COATED ORAL at 21:58

## 2021-01-01 RX ADMIN — HYDROXYZINE PAMOATE 25 MG: 25 CAPSULE ORAL at 23:55

## 2021-01-01 RX ADMIN — ATORVASTATIN CALCIUM 10 MG: 10 TABLET, FILM COATED ORAL at 09:28

## 2021-01-01 RX ADMIN — ONDANSETRON 4 MG: 2 INJECTION INTRAMUSCULAR; INTRAVENOUS at 07:39

## 2021-01-01 RX ADMIN — SERTRALINE HYDROCHLORIDE 100 MG: 50 TABLET ORAL at 09:52

## 2021-01-01 RX ADMIN — INSULIN LISPRO 2 UNITS: 100 INJECTION, SOLUTION INTRAVENOUS; SUBCUTANEOUS at 11:57

## 2021-01-01 RX ADMIN — ENOXAPARIN SODIUM 90 MG: 100 INJECTION SUBCUTANEOUS at 22:25

## 2021-01-01 RX ADMIN — ATORVASTATIN CALCIUM 10 MG: 10 TABLET, FILM COATED ORAL at 08:07

## 2021-01-01 RX ADMIN — LEVOTHYROXINE SODIUM 88 MCG: 0.09 TABLET ORAL at 08:16

## 2021-01-01 RX ADMIN — ATORVASTATIN CALCIUM 10 MG: 10 TABLET, FILM COATED ORAL at 12:12

## 2021-01-01 RX ADMIN — NYSTATIN 500000 UNITS: 100000 SUSPENSION ORAL at 08:16

## 2021-01-01 RX ADMIN — ACETAMINOPHEN 650 MG: 325 TABLET, FILM COATED ORAL at 08:52

## 2021-01-01 RX ADMIN — Medication 1 CAPSULE: at 09:47

## 2021-01-01 RX ADMIN — INSULIN LISPRO 3 UNITS: 100 INJECTION, SOLUTION INTRAVENOUS; SUBCUTANEOUS at 18:05

## 2021-01-01 RX ADMIN — NYSTATIN 500000 UNITS: 100000 SUSPENSION ORAL at 15:49

## 2021-01-01 RX ADMIN — POTASSIUM CHLORIDE 60 MEQ: 1500 TABLET, EXTENDED RELEASE ORAL at 09:46

## 2021-01-01 RX ADMIN — INSULIN LISPRO 3 UNITS: 100 INJECTION, SOLUTION INTRAVENOUS; SUBCUTANEOUS at 08:42

## 2021-01-01 RX ADMIN — ATORVASTATIN CALCIUM 10 MG: 10 TABLET, FILM COATED ORAL at 09:30

## 2021-01-01 RX ADMIN — INSULIN LISPRO 3 UNITS: 100 INJECTION, SOLUTION INTRAVENOUS; SUBCUTANEOUS at 13:01

## 2021-01-01 RX ADMIN — CYCLOBENZAPRINE 10 MG: 10 TABLET, FILM COATED ORAL at 20:35

## 2021-01-01 RX ADMIN — CYCLOBENZAPRINE 10 MG: 10 TABLET, FILM COATED ORAL at 08:12

## 2021-01-01 RX ADMIN — ENOXAPARIN SODIUM 40 MG: 40 INJECTION SUBCUTANEOUS at 17:01

## 2021-01-01 RX ADMIN — ATORVASTATIN CALCIUM 10 MG: 10 TABLET, FILM COATED ORAL at 08:29

## 2021-01-01 RX ADMIN — ATORVASTATIN CALCIUM 10 MG: 10 TABLET, FILM COATED ORAL at 10:34

## 2021-01-01 RX ADMIN — ACETAMINOPHEN 650 MG: 325 TABLET, FILM COATED ORAL at 14:37

## 2021-01-01 RX ADMIN — Medication: at 21:15

## 2021-01-01 RX ADMIN — ACETAMINOPHEN 650 MG: 325 TABLET, FILM COATED ORAL at 18:45

## 2021-01-01 RX ADMIN — CYCLOBENZAPRINE 10 MG: 10 TABLET, FILM COATED ORAL at 10:36

## 2021-01-01 RX ADMIN — LEVOTHYROXINE SODIUM 88 MCG: 0.09 TABLET ORAL at 06:13

## 2021-01-01 RX ADMIN — ATORVASTATIN CALCIUM 10 MG: 10 TABLET, FILM COATED ORAL at 09:19

## 2021-01-01 RX ADMIN — SODIUM BICARBONATE 50 MEQ: 84 INJECTION, SOLUTION INTRAVENOUS at 21:01

## 2021-01-01 RX ADMIN — CYCLOBENZAPRINE 10 MG: 10 TABLET, FILM COATED ORAL at 16:00

## 2021-01-01 RX ADMIN — NYSTATIN 500000 UNITS: 100000 SUSPENSION ORAL at 18:16

## 2021-01-01 RX ADMIN — SODIUM BICARBONATE 100 MEQ: 84 INJECTION INTRAVENOUS at 10:02

## 2021-01-01 RX ADMIN — PIPERACILLIN AND TAZOBACTAM 3.38 G: 3; .375 INJECTION, POWDER, LYOPHILIZED, FOR SOLUTION INTRAVENOUS at 17:06

## 2021-01-01 RX ADMIN — PANTOPRAZOLE SODIUM 20 MG: 20 TABLET, DELAYED RELEASE ORAL at 09:10

## 2021-01-01 RX ADMIN — CYCLOBENZAPRINE 10 MG: 10 TABLET, FILM COATED ORAL at 23:06

## 2021-01-01 RX ADMIN — MELOXICAM 7.5 MG: 7.5 TABLET ORAL at 08:07

## 2021-01-01 RX ADMIN — CYCLOBENZAPRINE 10 MG: 10 TABLET, FILM COATED ORAL at 22:33

## 2021-01-01 RX ADMIN — INSULIN LISPRO 2 UNITS: 100 INJECTION, SOLUTION INTRAVENOUS; SUBCUTANEOUS at 20:43

## 2021-01-01 RX ADMIN — Medication 5 MG: at 22:12

## 2021-01-01 RX ADMIN — LISINOPRIL AND HYDROCHLOROTHIAZIDE 1 TABLET: 12.5; 1 TABLET ORAL at 09:19

## 2021-01-01 RX ADMIN — PANTOPRAZOLE SODIUM 20 MG: 20 TABLET, DELAYED RELEASE ORAL at 08:42

## 2021-01-01 RX ADMIN — SODIUM BICARBONATE 150 MEQ: 84 INJECTION INTRAVENOUS at 11:00

## 2021-01-01 RX ADMIN — GUAIFENESIN 600 MG: 600 TABLET, EXTENDED RELEASE ORAL at 10:46

## 2021-01-01 RX ADMIN — SERTRALINE HYDROCHLORIDE 100 MG: 50 TABLET ORAL at 09:28

## 2021-01-01 RX ADMIN — ENOXAPARIN SODIUM 90 MG: 100 INJECTION SUBCUTANEOUS at 12:19

## 2021-01-01 RX ADMIN — Medication 5 MG: at 21:58

## 2021-01-01 RX ADMIN — Medication 5 MG: at 21:14

## 2021-01-01 RX ADMIN — NYSTATIN 500000 UNITS: 100000 SUSPENSION ORAL at 20:21

## 2021-01-01 RX ADMIN — MELOXICAM 7.5 MG: 7.5 TABLET ORAL at 08:42

## 2021-01-01 RX ADMIN — FUROSEMIDE 40 MG: 10 INJECTION, SOLUTION INTRAMUSCULAR; INTRAVENOUS at 08:50

## 2021-01-01 RX ADMIN — FUROSEMIDE 40 MG: 10 INJECTION, SOLUTION INTRAMUSCULAR; INTRAVENOUS at 20:51

## 2021-01-01 RX ADMIN — CYCLOBENZAPRINE 10 MG: 10 TABLET, FILM COATED ORAL at 15:37

## 2021-01-01 RX ADMIN — DEXAMETHASONE SODIUM PHOSPHATE 6 MG: 4 INJECTION, SOLUTION INTRA-ARTICULAR; INTRALESIONAL; INTRAMUSCULAR; INTRAVENOUS; SOFT TISSUE at 13:16

## 2021-01-01 RX ADMIN — DEXAMETHASONE 4 MG: 4 TABLET ORAL at 10:46

## 2021-01-01 RX ADMIN — PIPERACILLIN AND TAZOBACTAM 3.38 G: 3; .375 INJECTION, POWDER, LYOPHILIZED, FOR SOLUTION INTRAVENOUS at 11:08

## 2021-01-01 RX ADMIN — SODIUM CHLORIDE 250 ML: 9 INJECTION, SOLUTION INTRAVENOUS at 06:19

## 2021-01-01 RX ADMIN — CYCLOBENZAPRINE 10 MG: 10 TABLET, FILM COATED ORAL at 21:42

## 2021-01-01 RX ADMIN — TOCILIZUMAB 400 MG: 20 INJECTION, SOLUTION, CONCENTRATE INTRAVENOUS at 21:25

## 2021-01-01 RX ADMIN — DEXAMETHASONE SODIUM PHOSPHATE 6 MG: 4 INJECTION, SOLUTION INTRA-ARTICULAR; INTRALESIONAL; INTRAMUSCULAR; INTRAVENOUS; SOFT TISSUE at 05:46

## 2021-01-01 RX ADMIN — DEXAMETHASONE SODIUM PHOSPHATE 6 MG: 4 INJECTION, SOLUTION INTRA-ARTICULAR; INTRALESIONAL; INTRAMUSCULAR; INTRAVENOUS; SOFT TISSUE at 13:48

## 2021-01-01 RX ADMIN — ENOXAPARIN SODIUM 90 MG: 100 INJECTION SUBCUTANEOUS at 22:38

## 2021-01-01 RX ADMIN — INSULIN LISPRO 3 UNITS: 100 INJECTION, SOLUTION INTRAVENOUS; SUBCUTANEOUS at 11:24

## 2021-01-01 RX ADMIN — LORAZEPAM 1 MG: 2 INJECTION INTRAMUSCULAR; INTRAVENOUS at 04:27

## 2021-01-01 RX ADMIN — GUAIFENESIN 600 MG: 600 TABLET, EXTENDED RELEASE ORAL at 08:16

## 2021-01-01 RX ADMIN — DEXAMETHASONE SODIUM PHOSPHATE 6 MG: 4 INJECTION, SOLUTION INTRA-ARTICULAR; INTRALESIONAL; INTRAMUSCULAR; INTRAVENOUS; SOFT TISSUE at 21:25

## 2021-01-01 RX ADMIN — NYSTATIN 500000 UNITS: 100000 SUSPENSION ORAL at 08:01

## 2021-01-01 RX ADMIN — FUROSEMIDE 40 MG: 10 INJECTION, SOLUTION INTRAMUSCULAR; INTRAVENOUS at 21:20

## 2021-01-01 RX ADMIN — GUAIFENESIN 600 MG: 600 TABLET, EXTENDED RELEASE ORAL at 08:02

## 2021-01-01 RX ADMIN — POTASSIUM CHLORIDE AND SODIUM CHLORIDE: 900; 150 INJECTION, SOLUTION INTRAVENOUS at 13:48

## 2021-01-01 RX ADMIN — CYCLOBENZAPRINE 10 MG: 10 TABLET, FILM COATED ORAL at 08:01

## 2021-01-01 RX ADMIN — SODIUM CHLORIDE 200 MG: 9 INJECTION, SOLUTION INTRAVENOUS at 19:18

## 2021-01-01 RX ADMIN — NYSTATIN 500000 UNITS: 100000 SUSPENSION ORAL at 18:19

## 2021-01-01 RX ADMIN — ENOXAPARIN SODIUM 90 MG: 100 INJECTION SUBCUTANEOUS at 23:40

## 2021-01-01 RX ADMIN — FUROSEMIDE 40 MG: 10 INJECTION, SOLUTION INTRAMUSCULAR; INTRAVENOUS at 10:46

## 2021-01-01 RX ADMIN — Medication 1 CAPSULE: at 09:52

## 2021-01-01 RX ADMIN — CYCLOBENZAPRINE 10 MG: 10 TABLET, FILM COATED ORAL at 08:16

## 2021-01-01 RX ADMIN — INSULIN LISPRO 2 UNITS: 100 INJECTION, SOLUTION INTRAVENOUS; SUBCUTANEOUS at 23:54

## 2021-01-01 RX ADMIN — QUETIAPINE FUMARATE 12.5 MG: 25 TABLET, FILM COATED ORAL at 21:49

## 2021-01-01 RX ADMIN — CYCLOBENZAPRINE 10 MG: 10 TABLET, FILM COATED ORAL at 17:08

## 2021-01-01 RX ADMIN — PIPERACILLIN AND TAZOBACTAM 3.38 G: 3; .375 INJECTION, POWDER, LYOPHILIZED, FOR SOLUTION INTRAVENOUS at 01:35

## 2021-01-01 RX ADMIN — POTASSIUM CHLORIDE 40 MEQ: 1.5 FOR SOLUTION ORAL at 16:58

## 2021-01-01 RX ADMIN — ENOXAPARIN SODIUM 90 MG: 100 INJECTION SUBCUTANEOUS at 22:13

## 2021-01-01 RX ADMIN — NYSTATIN 500000 UNITS: 100000 SUSPENSION ORAL at 22:00

## 2021-01-01 RX ADMIN — PIPERACILLIN AND TAZOBACTAM 3.38 G: 3; .375 INJECTION, POWDER, LYOPHILIZED, FOR SOLUTION INTRAVENOUS at 03:54

## 2021-01-01 RX ADMIN — MELOXICAM 7.5 MG: 7.5 TABLET ORAL at 08:30

## 2021-01-01 RX ADMIN — PIPERACILLIN AND TAZOBACTAM 3.38 G: 3; .375 INJECTION, POWDER, LYOPHILIZED, FOR SOLUTION INTRAVENOUS at 17:45

## 2021-01-01 RX ADMIN — QUETIAPINE FUMARATE 12.5 MG: 25 TABLET, FILM COATED ORAL at 22:34

## 2021-01-01 RX ADMIN — LEVOTHYROXINE SODIUM 88 MCG: 0.09 TABLET ORAL at 08:02

## 2021-01-01 RX ADMIN — PIPERACILLIN AND TAZOBACTAM 3.38 G: 3; .375 INJECTION, POWDER, LYOPHILIZED, FOR SOLUTION INTRAVENOUS at 02:12

## 2021-01-01 RX ADMIN — INSULIN LISPRO 3 UNITS: 100 INJECTION, SOLUTION INTRAVENOUS; SUBCUTANEOUS at 17:07

## 2021-01-01 RX ADMIN — SERTRALINE HYDROCHLORIDE 100 MG: 50 TABLET ORAL at 08:42

## 2021-01-01 RX ADMIN — SALINE NASAL SPRAY 2 SPRAY: 1.5 SOLUTION NASAL at 14:00

## 2021-01-01 RX ADMIN — ENOXAPARIN SODIUM 90 MG: 100 INJECTION SUBCUTANEOUS at 11:06

## 2021-01-01 RX ADMIN — GUAIFENESIN 600 MG: 600 TABLET, EXTENDED RELEASE ORAL at 08:17

## 2021-01-01 RX ADMIN — NYSTATIN 500000 UNITS: 100000 SUSPENSION ORAL at 17:01

## 2021-01-01 RX ADMIN — INSULIN LISPRO 2 UNITS: 100 INJECTION, SOLUTION INTRAVENOUS; SUBCUTANEOUS at 17:02

## 2021-01-01 RX ADMIN — DEXAMETHASONE SODIUM PHOSPHATE 6 MG: 4 INJECTION, SOLUTION INTRA-ARTICULAR; INTRALESIONAL; INTRAMUSCULAR; INTRAVENOUS; SOFT TISSUE at 21:21

## 2021-01-01 RX ADMIN — INSULIN LISPRO 3 UNITS: 100 INJECTION, SOLUTION INTRAVENOUS; SUBCUTANEOUS at 17:42

## 2021-01-01 RX ADMIN — IMIPRAMINE HYDROCHLORIDE 50 MG: 25 TABLET ORAL at 22:33

## 2021-01-01 RX ADMIN — LEVOTHYROXINE SODIUM 88 MCG: 0.09 TABLET ORAL at 10:07

## 2021-01-01 RX ADMIN — NYSTATIN 500000 UNITS: 100000 SUSPENSION ORAL at 21:59

## 2021-01-01 RX ADMIN — PANTOPRAZOLE SODIUM 20 MG: 20 TABLET, DELAYED RELEASE ORAL at 09:19

## 2021-01-01 RX ADMIN — PANTOPRAZOLE SODIUM 20 MG: 20 TABLET, DELAYED RELEASE ORAL at 10:37

## 2021-01-01 RX ADMIN — SODIUM BICARBONATE 150 MEQ: 84 INJECTION, SOLUTION INTRAVENOUS at 11:00

## 2021-01-01 RX ADMIN — ATORVASTATIN CALCIUM 10 MG: 10 TABLET, FILM COATED ORAL at 09:46

## 2021-01-01 RX ADMIN — Medication 1 CAPSULE: at 08:42

## 2021-01-01 RX ADMIN — SERTRALINE HYDROCHLORIDE 100 MG: 50 TABLET ORAL at 08:33

## 2021-01-01 RX ADMIN — Medication: at 08:02

## 2021-01-01 RX ADMIN — ATORVASTATIN CALCIUM 10 MG: 10 TABLET, FILM COATED ORAL at 10:46

## 2021-01-01 RX ADMIN — SERTRALINE HYDROCHLORIDE 100 MG: 50 TABLET ORAL at 08:16

## 2021-01-01 RX ADMIN — NYSTATIN 500000 UNITS: 100000 SUSPENSION ORAL at 14:32

## 2021-01-01 RX ADMIN — FUROSEMIDE 40 MG: 10 INJECTION, SOLUTION INTRAMUSCULAR; INTRAVENOUS at 21:03

## 2021-01-01 RX ADMIN — CYCLOBENZAPRINE 10 MG: 10 TABLET, FILM COATED ORAL at 15:07

## 2021-01-01 RX ADMIN — FAMOTIDINE 20 MG: 20 TABLET ORAL at 08:16

## 2021-01-01 RX ADMIN — DEXAMETHASONE SODIUM PHOSPHATE 6 MG: 4 INJECTION, SOLUTION INTRA-ARTICULAR; INTRALESIONAL; INTRAMUSCULAR; INTRAVENOUS; SOFT TISSUE at 05:45

## 2021-01-01 RX ADMIN — MELOXICAM 7.5 MG: 7.5 TABLET ORAL at 10:34

## 2021-01-01 RX ADMIN — Medication: at 08:51

## 2021-01-01 RX ADMIN — CYCLOBENZAPRINE 10 MG: 10 TABLET, FILM COATED ORAL at 09:10

## 2021-01-01 RX ADMIN — CYCLOBENZAPRINE 10 MG: 10 TABLET, FILM COATED ORAL at 16:17

## 2021-01-01 RX ADMIN — Medication 1 CAPSULE: at 10:33

## 2021-01-01 RX ADMIN — SERTRALINE HYDROCHLORIDE 100 MG: 50 TABLET ORAL at 09:46

## 2021-01-01 RX ADMIN — IMIPRAMINE HYDROCHLORIDE 50 MG: 25 TABLET ORAL at 20:39

## 2021-01-01 RX ADMIN — Medication 1 CAPSULE: at 08:29

## 2021-01-01 RX ADMIN — FUROSEMIDE 40 MG: 10 INJECTION, SOLUTION INTRAMUSCULAR; INTRAVENOUS at 10:37

## 2021-01-01 RX ADMIN — NYSTATIN 500000 UNITS: 100000 SUSPENSION ORAL at 17:20

## 2021-01-01 RX ADMIN — INSULIN LISPRO 5 UNITS: 100 INJECTION, SOLUTION INTRAVENOUS; SUBCUTANEOUS at 16:22

## 2021-01-01 RX ADMIN — INSULIN LISPRO 7 UNITS: 100 INJECTION, SOLUTION INTRAVENOUS; SUBCUTANEOUS at 23:41

## 2021-01-01 RX ADMIN — PIPERACILLIN AND TAZOBACTAM 3.38 G: 3; .375 INJECTION, POWDER, LYOPHILIZED, FOR SOLUTION INTRAVENOUS at 09:46

## 2021-01-01 RX ADMIN — ACETAMINOPHEN 650 MG: 325 TABLET, FILM COATED ORAL at 20:00

## 2021-01-01 RX ADMIN — PIPERACILLIN AND TAZOBACTAM 3.38 G: 3; .375 INJECTION, POWDER, LYOPHILIZED, FOR SOLUTION INTRAVENOUS at 10:47

## 2021-01-01 RX ADMIN — NYSTATIN 500000 UNITS: 100000 SUSPENSION ORAL at 22:33

## 2021-01-01 RX ADMIN — DEXAMETHASONE 4 MG: 4 TABLET ORAL at 11:37

## 2021-01-01 RX ADMIN — LEVOTHYROXINE SODIUM 88 MCG: 0.09 TABLET ORAL at 08:24

## 2021-01-01 RX ADMIN — CYCLOBENZAPRINE 10 MG: 10 TABLET, FILM COATED ORAL at 08:49

## 2021-01-01 RX ADMIN — ENOXAPARIN SODIUM 90 MG: 100 INJECTION SUBCUTANEOUS at 11:24

## 2021-01-01 RX ADMIN — IMIPRAMINE HYDROCHLORIDE 50 MG: 25 TABLET ORAL at 21:49

## 2021-01-01 RX ADMIN — PANTOPRAZOLE SODIUM 20 MG: 20 TABLET, DELAYED RELEASE ORAL at 08:24

## 2021-01-01 RX ADMIN — DEXAMETHASONE SODIUM PHOSPHATE 6 MG: 4 INJECTION, SOLUTION INTRA-ARTICULAR; INTRALESIONAL; INTRAMUSCULAR; INTRAVENOUS; SOFT TISSUE at 20:39

## 2021-01-01 RX ADMIN — INSULIN LISPRO 3 UNITS: 100 INJECTION, SOLUTION INTRAVENOUS; SUBCUTANEOUS at 08:33

## 2021-01-01 RX ADMIN — NYSTATIN 500000 UNITS: 100000 SUSPENSION ORAL at 12:31

## 2021-01-01 RX ADMIN — SODIUM CHLORIDE 500 ML: 9 INJECTION, SOLUTION INTRAVENOUS at 19:00

## 2021-01-01 RX ADMIN — ACETAMINOPHEN 650 MG: 325 TABLET, FILM COATED ORAL at 12:31

## 2021-01-01 RX ADMIN — Medication: at 08:18

## 2021-01-01 RX ADMIN — GUAIFENESIN 600 MG: 600 TABLET, EXTENDED RELEASE ORAL at 13:29

## 2021-01-01 RX ADMIN — HYDROXYZINE PAMOATE 25 MG: 25 CAPSULE ORAL at 21:03

## 2021-01-01 RX ADMIN — PANTOPRAZOLE SODIUM 20 MG: 20 TABLET, DELAYED RELEASE ORAL at 09:14

## 2021-01-01 RX ADMIN — INSULIN LISPRO 2 UNITS: 100 INJECTION, SOLUTION INTRAVENOUS; SUBCUTANEOUS at 08:17

## 2021-01-01 RX ADMIN — SODIUM CHLORIDE 500 ML: 9 INJECTION, SOLUTION INTRAVENOUS at 19:14

## 2021-01-01 RX ADMIN — PIPERACILLIN AND TAZOBACTAM 3.38 G: 3; .375 INJECTION, POWDER, LYOPHILIZED, FOR SOLUTION INTRAVENOUS at 18:03

## 2021-01-01 RX ADMIN — AZITHROMYCIN DIHYDRATE 500 MG: 500 INJECTION, POWDER, LYOPHILIZED, FOR SOLUTION INTRAVENOUS at 17:47

## 2021-01-01 RX ADMIN — ENOXAPARIN SODIUM 40 MG: 40 INJECTION SUBCUTANEOUS at 18:06

## 2021-01-01 RX ADMIN — ATORVASTATIN CALCIUM 10 MG: 10 TABLET, FILM COATED ORAL at 09:52

## 2021-01-01 RX ADMIN — MELOXICAM 7.5 MG: 7.5 TABLET ORAL at 09:47

## 2021-01-01 RX ADMIN — Medication: at 09:00

## 2021-01-01 RX ADMIN — INSULIN LISPRO 2 UNITS: 100 INJECTION, SOLUTION INTRAVENOUS; SUBCUTANEOUS at 13:01

## 2021-01-01 RX ADMIN — NYSTATIN 500000 UNITS: 100000 SUSPENSION ORAL at 17:27

## 2021-01-01 RX ADMIN — NYSTATIN 500000 UNITS: 100000 SUSPENSION ORAL at 13:48

## 2021-01-01 RX ADMIN — SODIUM CHLORIDE 250 ML: 9 INJECTION, SOLUTION INTRAVENOUS at 05:00

## 2021-01-01 RX ADMIN — CYCLOBENZAPRINE 10 MG: 10 TABLET, FILM COATED ORAL at 08:23

## 2021-01-01 RX ADMIN — ATORVASTATIN CALCIUM 10 MG: 10 TABLET, FILM COATED ORAL at 09:15

## 2021-01-01 RX ADMIN — Medication 80 MCG/MIN: at 01:58

## 2021-01-01 RX ADMIN — DEXAMETHASONE SODIUM PHOSPHATE 6 MG: 4 INJECTION, SOLUTION INTRA-ARTICULAR; INTRALESIONAL; INTRAMUSCULAR; INTRAVENOUS; SOFT TISSUE at 13:01

## 2021-01-01 RX ADMIN — POTASSIUM CHLORIDE 80 MEQ: 1500 TABLET, EXTENDED RELEASE ORAL at 10:46

## 2021-01-01 RX ADMIN — NYSTATIN 500000 UNITS: 100000 SUSPENSION ORAL at 10:36

## 2021-01-01 RX ADMIN — FUROSEMIDE 40 MG: 10 INJECTION, SOLUTION INTRAMUSCULAR; INTRAVENOUS at 08:16

## 2021-01-01 RX ADMIN — ENOXAPARIN SODIUM 40 MG: 40 INJECTION SUBCUTANEOUS at 16:43

## 2021-01-01 RX ADMIN — ENOXAPARIN SODIUM 40 MG: 40 INJECTION SUBCUTANEOUS at 17:13

## 2021-01-01 RX ADMIN — LEVOTHYROXINE SODIUM 88 MCG: 0.09 TABLET ORAL at 10:46

## 2021-01-01 RX ADMIN — AZITHROMYCIN DIHYDRATE 500 MG: 500 INJECTION, POWDER, LYOPHILIZED, FOR SOLUTION INTRAVENOUS at 16:40

## 2021-01-01 RX ADMIN — NYSTATIN 500000 UNITS: 100000 SUSPENSION ORAL at 12:19

## 2021-01-01 RX ADMIN — DEXAMETHASONE SODIUM PHOSPHATE 6 MG: 4 INJECTION, SOLUTION INTRA-ARTICULAR; INTRALESIONAL; INTRAMUSCULAR; INTRAVENOUS; SOFT TISSUE at 06:09

## 2021-01-01 RX ADMIN — Medication 90 MCG/MIN: at 03:57

## 2021-01-01 RX ADMIN — CYCLOBENZAPRINE 10 MG: 10 TABLET, FILM COATED ORAL at 20:43

## 2021-01-01 RX ADMIN — DEXAMETHASONE SODIUM PHOSPHATE 6 MG: 4 INJECTION, SOLUTION INTRA-ARTICULAR; INTRALESIONAL; INTRAMUSCULAR; INTRAVENOUS; SOFT TISSUE at 14:00

## 2021-01-01 RX ADMIN — CYCLOBENZAPRINE 10 MG: 10 TABLET, FILM COATED ORAL at 10:46

## 2021-01-01 RX ADMIN — HYDROXYZINE PAMOATE 25 MG: 25 CAPSULE ORAL at 22:05

## 2021-01-01 RX ADMIN — REMDESIVIR 100 MG: 100 INJECTION, POWDER, LYOPHILIZED, FOR SOLUTION INTRAVENOUS at 21:45

## 2021-01-01 RX ADMIN — MELOXICAM 7.5 MG: 7.5 TABLET ORAL at 09:28

## 2021-01-01 RX ADMIN — INSULIN LISPRO 10 UNITS: 100 INJECTION, SOLUTION INTRAVENOUS; SUBCUTANEOUS at 09:18

## 2021-01-01 RX ADMIN — INSULIN LISPRO 2 UNITS: 100 INJECTION, SOLUTION INTRAVENOUS; SUBCUTANEOUS at 18:14

## 2021-01-01 RX ADMIN — INSULIN LISPRO 3 UNITS: 100 INJECTION, SOLUTION INTRAVENOUS; SUBCUTANEOUS at 16:30

## 2021-01-01 RX ADMIN — SODIUM CHLORIDE 250 ML: 9 INJECTION, SOLUTION INTRAVENOUS at 06:35

## 2021-01-01 RX ADMIN — LEVOTHYROXINE SODIUM 88 MCG: 0.09 TABLET ORAL at 08:12

## 2021-01-01 RX ADMIN — Medication 5 MG: at 22:19

## 2021-01-01 RX ADMIN — FUROSEMIDE 40 MG: 10 INJECTION, SOLUTION INTRAMUSCULAR; INTRAVENOUS at 08:01

## 2021-01-01 RX ADMIN — SERTRALINE HYDROCHLORIDE 100 MG: 50 TABLET ORAL at 09:10

## 2021-01-01 RX ADMIN — PIPERACILLIN AND TAZOBACTAM 3.38 G: 3; .375 INJECTION, POWDER, LYOPHILIZED, FOR SOLUTION INTRAVENOUS at 06:32

## 2021-01-01 RX ADMIN — Medication 1 CAPSULE: at 08:12

## 2021-01-01 RX ADMIN — MELOXICAM 7.5 MG: 7.5 TABLET ORAL at 09:46

## 2021-01-01 RX ADMIN — ENOXAPARIN SODIUM 90 MG: 100 INJECTION SUBCUTANEOUS at 22:58

## 2021-01-01 RX ADMIN — CYCLOBENZAPRINE 10 MG: 10 TABLET, FILM COATED ORAL at 15:49

## 2021-01-01 RX ADMIN — NYSTATIN 500000 UNITS: 100000 SUSPENSION ORAL at 08:50

## 2021-01-01 RX ADMIN — LEVOTHYROXINE SODIUM 88 MCG: 0.09 TABLET ORAL at 09:10

## 2021-01-01 RX ADMIN — ENOXAPARIN SODIUM 90 MG: 100 INJECTION SUBCUTANEOUS at 22:33

## 2021-01-01 RX ADMIN — ENOXAPARIN SODIUM 40 MG: 40 INJECTION SUBCUTANEOUS at 16:17

## 2021-01-01 RX ADMIN — CYCLOBENZAPRINE 10 MG: 10 TABLET, FILM COATED ORAL at 20:21

## 2021-01-01 RX ADMIN — DEXAMETHASONE SODIUM PHOSPHATE 6 MG: 4 INJECTION, SOLUTION INTRA-ARTICULAR; INTRALESIONAL; INTRAMUSCULAR; INTRAVENOUS; SOFT TISSUE at 15:31

## 2021-01-01 RX ADMIN — BENZOCAINE AND MENTHOL 1 LOZENGE: 15; 3.6 LOZENGE ORAL at 16:40

## 2021-01-01 RX ADMIN — LEVOTHYROXINE SODIUM 88 MCG: 0.09 TABLET ORAL at 05:39

## 2021-01-01 RX ADMIN — Medication 1 CAPSULE: at 12:12

## 2021-01-01 RX ADMIN — LEVOTHYROXINE SODIUM 88 MCG: 0.09 TABLET ORAL at 08:30

## 2021-01-01 RX ADMIN — DEXAMETHASONE SODIUM PHOSPHATE 6 MG: 4 INJECTION, SOLUTION INTRA-ARTICULAR; INTRALESIONAL; INTRAMUSCULAR; INTRAVENOUS; SOFT TISSUE at 05:01

## 2021-01-01 RX ADMIN — NYSTATIN 500000 UNITS: 100000 SUSPENSION ORAL at 13:40

## 2021-01-01 RX ADMIN — AZITHROMYCIN DIHYDRATE 500 MG: 500 INJECTION, POWDER, LYOPHILIZED, FOR SOLUTION INTRAVENOUS at 16:50

## 2021-01-01 RX ADMIN — FAMOTIDINE 20 MG: 20 TABLET ORAL at 10:46

## 2021-01-01 RX ADMIN — ENOXAPARIN SODIUM 90 MG: 100 INJECTION SUBCUTANEOUS at 11:50

## 2021-01-01 RX ADMIN — LEVOTHYROXINE SODIUM 88 MCG: 0.09 TABLET ORAL at 12:12

## 2021-01-01 RX ADMIN — PROPOFOL 30 MCG/KG/MIN: 10 INJECTION, EMULSION INTRAVENOUS at 22:01

## 2021-01-01 RX ADMIN — GUAIFENESIN 600 MG: 600 TABLET, EXTENDED RELEASE ORAL at 22:12

## 2021-01-01 RX ADMIN — INSULIN LISPRO 2 UNITS: 100 INJECTION, SOLUTION INTRAVENOUS; SUBCUTANEOUS at 15:35

## 2021-01-01 RX ADMIN — INSULIN LISPRO 3 UNITS: 100 INJECTION, SOLUTION INTRAVENOUS; SUBCUTANEOUS at 15:49

## 2021-01-01 RX ADMIN — IMIPRAMINE HYDROCHLORIDE 50 MG: 25 TABLET ORAL at 21:58

## 2021-01-01 RX ADMIN — ACETAMINOPHEN 650 MG: 325 TABLET, FILM COATED ORAL at 17:31

## 2021-01-01 RX ADMIN — CYCLOBENZAPRINE 10 MG: 10 TABLET, FILM COATED ORAL at 20:53

## 2021-01-01 RX ADMIN — ENOXAPARIN SODIUM 90 MG: 100 INJECTION SUBCUTANEOUS at 22:07

## 2021-01-01 RX ADMIN — CYCLOBENZAPRINE 10 MG: 10 TABLET, FILM COATED ORAL at 17:45

## 2021-01-01 RX ADMIN — DEXAMETHASONE SODIUM PHOSPHATE 6 MG: 4 INJECTION, SOLUTION INTRA-ARTICULAR; INTRALESIONAL; INTRAMUSCULAR; INTRAVENOUS; SOFT TISSUE at 05:26

## 2021-01-01 RX ADMIN — PIPERACILLIN AND TAZOBACTAM 3.38 G: 3; .375 INJECTION, POWDER, LYOPHILIZED, FOR SOLUTION INTRAVENOUS at 02:35

## 2021-01-01 RX ADMIN — Medication 1 CAPSULE: at 09:46

## 2021-01-01 RX ADMIN — ONDANSETRON 4 MG: 2 INJECTION INTRAMUSCULAR; INTRAVENOUS at 02:41

## 2021-01-01 RX ADMIN — CYCLOBENZAPRINE 10 MG: 10 TABLET, FILM COATED ORAL at 10:33

## 2021-01-01 RX ADMIN — SERTRALINE HYDROCHLORIDE 100 MG: 50 TABLET ORAL at 09:30

## 2021-01-01 RX ADMIN — Medication 5 MG: at 21:03

## 2021-01-01 RX ADMIN — INSULIN LISPRO 2 UNITS: 100 INJECTION, SOLUTION INTRAVENOUS; SUBCUTANEOUS at 07:30

## 2021-01-01 RX ADMIN — INSULIN LISPRO 2 UNITS: 100 INJECTION, SOLUTION INTRAVENOUS; SUBCUTANEOUS at 09:27

## 2021-01-01 RX ADMIN — DEXAMETHASONE SODIUM PHOSPHATE 6 MG: 4 INJECTION, SOLUTION INTRA-ARTICULAR; INTRALESIONAL; INTRAMUSCULAR; INTRAVENOUS; SOFT TISSUE at 21:13

## 2021-01-01 RX ADMIN — NYSTATIN 500000 UNITS: 100000 SUSPENSION ORAL at 08:12

## 2021-01-01 RX ADMIN — SERTRALINE HYDROCHLORIDE 100 MG: 50 TABLET ORAL at 09:19

## 2021-01-01 RX ADMIN — ACETAMINOPHEN 650 MG: 325 TABLET, FILM COATED ORAL at 19:19

## 2021-01-01 RX ADMIN — SERTRALINE HYDROCHLORIDE 100 MG: 50 TABLET ORAL at 08:07

## 2021-01-01 RX ADMIN — HYDROXYZINE PAMOATE 25 MG: 25 CAPSULE ORAL at 21:14

## 2021-01-01 RX ADMIN — IMIPRAMINE HYDROCHLORIDE 50 MG: 25 TABLET ORAL at 20:21

## 2021-01-01 RX ADMIN — Medication 1 CAPSULE: at 08:49

## 2021-01-01 RX ADMIN — SODIUM CHLORIDE 40 MG: 9 INJECTION, SOLUTION INTRAMUSCULAR; INTRAVENOUS; SUBCUTANEOUS at 13:40

## 2021-01-01 RX ADMIN — POTASSIUM CHLORIDE 10 MEQ: 7.46 INJECTION, SOLUTION INTRAVENOUS at 14:00

## 2021-01-01 RX ADMIN — INSULIN LISPRO 3 UNITS: 100 INJECTION, SOLUTION INTRAVENOUS; SUBCUTANEOUS at 12:20

## 2021-01-01 RX ADMIN — MAGNESIUM SULFATE HEPTAHYDRATE 3 G: 500 INJECTION, SOLUTION INTRAMUSCULAR; INTRAVENOUS at 11:08

## 2021-01-01 RX ADMIN — LEVOTHYROXINE SODIUM 88 MCG: 0.09 TABLET ORAL at 05:46

## 2021-01-01 RX ADMIN — ONDANSETRON 4 MG: 2 INJECTION INTRAMUSCULAR; INTRAVENOUS at 23:52

## 2021-01-01 RX ADMIN — ATORVASTATIN CALCIUM 10 MG: 10 TABLET, FILM COATED ORAL at 09:10

## 2021-01-01 RX ADMIN — MAGNESIUM SULFATE HEPTAHYDRATE 3 G: 500 INJECTION, SOLUTION INTRAMUSCULAR; INTRAVENOUS at 10:46

## 2021-01-01 RX ADMIN — ENOXAPARIN SODIUM 90 MG: 100 INJECTION SUBCUTANEOUS at 23:00

## 2021-01-01 RX ADMIN — Medication 120 MCG/MIN: at 05:00

## 2021-01-01 RX ADMIN — INSULIN LISPRO 3 UNITS: 100 INJECTION, SOLUTION INTRAVENOUS; SUBCUTANEOUS at 09:47

## 2021-01-01 RX ADMIN — PANTOPRAZOLE SODIUM 20 MG: 20 TABLET, DELAYED RELEASE ORAL at 08:33

## 2021-01-01 RX ADMIN — GUAIFENESIN 600 MG: 600 TABLET, EXTENDED RELEASE ORAL at 20:51

## 2021-01-01 RX ADMIN — LEVOTHYROXINE SODIUM 88 MCG: 0.09 TABLET ORAL at 05:11

## 2021-01-01 RX ADMIN — SERTRALINE HYDROCHLORIDE 100 MG: 50 TABLET ORAL at 10:37

## 2021-01-01 RX ADMIN — LEVOTHYROXINE SODIUM 88 MCG: 0.09 TABLET ORAL at 08:49

## 2021-01-01 RX ADMIN — PIPERACILLIN AND TAZOBACTAM 3.38 G: 3; .375 INJECTION, POWDER, LYOPHILIZED, FOR SOLUTION INTRAVENOUS at 18:00

## 2021-01-01 RX ADMIN — ATORVASTATIN CALCIUM 10 MG: 10 TABLET, FILM COATED ORAL at 10:37

## 2021-01-01 RX ADMIN — Medication 130 MCG/MIN: at 12:46

## 2021-01-01 RX ADMIN — DEXAMETHASONE SODIUM PHOSPHATE 6 MG: 4 INJECTION, SOLUTION INTRA-ARTICULAR; INTRALESIONAL; INTRAMUSCULAR; INTRAVENOUS; SOFT TISSUE at 13:59

## 2021-01-01 RX ADMIN — Medication 1 CAPSULE: at 09:19

## 2021-01-01 RX ADMIN — INSULIN LISPRO 3 UNITS: 100 INJECTION, SOLUTION INTRAVENOUS; SUBCUTANEOUS at 18:01

## 2021-01-01 RX ADMIN — DEXAMETHASONE SODIUM PHOSPHATE 6 MG: 4 INJECTION, SOLUTION INTRA-ARTICULAR; INTRALESIONAL; INTRAMUSCULAR; INTRAVENOUS; SOFT TISSUE at 06:13

## 2021-01-01 RX ADMIN — MELOXICAM 7.5 MG: 7.5 TABLET ORAL at 09:19

## 2021-01-01 RX ADMIN — PANTOPRAZOLE SODIUM 20 MG: 20 TABLET, DELAYED RELEASE ORAL at 09:47

## 2021-01-01 RX ADMIN — CYCLOBENZAPRINE 10 MG: 10 TABLET, FILM COATED ORAL at 18:05

## 2021-01-01 RX ADMIN — CYCLOBENZAPRINE 10 MG: 10 TABLET, FILM COATED ORAL at 17:01

## 2021-01-01 RX ADMIN — NYSTATIN 500000 UNITS: 100000 SUSPENSION ORAL at 17:25

## 2021-01-01 RX ADMIN — CYCLOBENZAPRINE 10 MG: 10 TABLET, FILM COATED ORAL at 17:48

## 2021-01-01 RX ADMIN — SODIUM BICARBONATE 150 MEQ: 84 INJECTION, SOLUTION INTRAVENOUS at 10:17

## 2021-01-01 RX ADMIN — Medication 5 MG: at 20:51

## 2021-01-01 RX ADMIN — INSULIN LISPRO 2 UNITS: 100 INJECTION, SOLUTION INTRAVENOUS; SUBCUTANEOUS at 12:19

## 2021-01-01 RX ADMIN — INSULIN LISPRO 3 UNITS: 100 INJECTION, SOLUTION INTRAVENOUS; SUBCUTANEOUS at 17:27

## 2021-01-01 RX ADMIN — Medication 1 CAPSULE: at 09:10

## 2021-01-01 RX ADMIN — ATORVASTATIN CALCIUM 10 MG: 10 TABLET, FILM COATED ORAL at 08:25

## 2021-01-01 RX ADMIN — POTASSIUM CHLORIDE 60 MEQ: 1500 TABLET, EXTENDED RELEASE ORAL at 09:16

## 2021-01-01 RX ADMIN — INSULIN LISPRO 3 UNITS: 100 INJECTION, SOLUTION INTRAVENOUS; SUBCUTANEOUS at 09:18

## 2021-01-01 RX ADMIN — MAGNESIUM SULFATE HEPTAHYDRATE 2 G: 40 INJECTION, SOLUTION INTRAVENOUS at 11:53

## 2021-01-01 RX ADMIN — Medication: at 22:06

## 2021-01-01 RX ADMIN — GUAIFENESIN 600 MG: 600 TABLET, EXTENDED RELEASE ORAL at 08:49

## 2021-01-01 RX ADMIN — INSULIN LISPRO 3 UNITS: 100 INJECTION, SOLUTION INTRAVENOUS; SUBCUTANEOUS at 17:43

## 2021-01-01 RX ADMIN — PANTOPRAZOLE SODIUM 20 MG: 20 TABLET, DELAYED RELEASE ORAL at 09:52

## 2021-01-01 RX ADMIN — FUROSEMIDE 40 MG: 10 INJECTION, SOLUTION INTRAMUSCULAR; INTRAVENOUS at 08:23

## 2021-01-01 RX ADMIN — ENOXAPARIN SODIUM 90 MG: 100 INJECTION SUBCUTANEOUS at 11:54

## 2021-01-01 RX ADMIN — ACETAMINOPHEN 650 MG: 325 TABLET, FILM COATED ORAL at 05:22

## 2021-01-01 RX ADMIN — ATORVASTATIN CALCIUM 10 MG: 10 TABLET, FILM COATED ORAL at 08:49

## 2021-01-01 RX ADMIN — QUETIAPINE FUMARATE 25 MG: 25 TABLET ORAL at 22:12

## 2021-01-01 RX ADMIN — Medication 80 MCG/MIN: at 00:29

## 2021-01-01 RX ADMIN — CYCLOBENZAPRINE 10 MG: 10 TABLET, FILM COATED ORAL at 08:07

## 2021-01-01 RX ADMIN — NYSTATIN 500000 UNITS: 100000 SUSPENSION ORAL at 16:05

## 2021-01-01 RX ADMIN — ENOXAPARIN SODIUM 90 MG: 100 INJECTION SUBCUTANEOUS at 22:05

## 2021-01-01 RX ADMIN — INSULIN LISPRO 3 UNITS: 100 INJECTION, SOLUTION INTRAVENOUS; SUBCUTANEOUS at 22:00

## 2021-01-01 RX ADMIN — CYCLOBENZAPRINE 10 MG: 10 TABLET, FILM COATED ORAL at 17:44

## 2021-01-01 RX ADMIN — NYSTATIN 500000 UNITS: 100000 SUSPENSION ORAL at 20:39

## 2021-01-01 RX ADMIN — ENOXAPARIN SODIUM 90 MG: 100 INJECTION SUBCUTANEOUS at 10:48

## 2021-01-01 RX ADMIN — Medication: at 08:24

## 2021-01-01 RX ADMIN — DEXAMETHASONE SODIUM PHOSPHATE 6 MG: 4 INJECTION, SOLUTION INTRA-ARTICULAR; INTRALESIONAL; INTRAMUSCULAR; INTRAVENOUS; SOFT TISSUE at 05:39

## 2021-01-01 RX ADMIN — CYCLOBENZAPRINE 10 MG: 10 TABLET, FILM COATED ORAL at 09:00

## 2021-01-01 RX ADMIN — CYCLOBENZAPRINE 10 MG: 10 TABLET, FILM COATED ORAL at 22:48

## 2021-01-01 RX ADMIN — Medication: at 21:28

## 2021-01-01 RX ADMIN — CYCLOBENZAPRINE 10 MG: 10 TABLET, FILM COATED ORAL at 16:40

## 2021-01-01 RX ADMIN — FAMOTIDINE 20 MG: 20 TABLET ORAL at 08:23

## 2021-01-01 RX ADMIN — DEXAMETHASONE SODIUM PHOSPHATE 6 MG: 4 INJECTION, SOLUTION INTRA-ARTICULAR; INTRALESIONAL; INTRAMUSCULAR; INTRAVENOUS; SOFT TISSUE at 20:00

## 2021-01-01 RX ADMIN — DEXAMETHASONE 4 MG: 4 TABLET ORAL at 08:50

## 2021-01-01 RX ADMIN — Medication: at 22:13

## 2021-01-01 RX ADMIN — LEVOTHYROXINE SODIUM 88 MCG: 0.09 TABLET ORAL at 08:42

## 2021-01-01 RX ADMIN — NYSTATIN 500000 UNITS: 100000 SUSPENSION ORAL at 22:25

## 2021-01-01 RX ADMIN — IMIPRAMINE HYDROCHLORIDE 50 MG: 25 TABLET ORAL at 23:06

## 2021-01-01 RX ADMIN — PANTOPRAZOLE SODIUM 20 MG: 20 TABLET, DELAYED RELEASE ORAL at 10:46

## 2021-01-01 RX ADMIN — ATORVASTATIN CALCIUM 10 MG: 10 TABLET, FILM COATED ORAL at 08:50

## 2021-01-01 RX ADMIN — CYCLOBENZAPRINE 10 MG: 10 TABLET, FILM COATED ORAL at 08:29

## 2021-01-01 RX ADMIN — IMIPRAMINE HYDROCHLORIDE 50 MG: 25 TABLET ORAL at 21:42

## 2021-01-01 RX ADMIN — PANTOPRAZOLE SODIUM 20 MG: 20 TABLET, DELAYED RELEASE ORAL at 08:29

## 2021-01-01 RX ADMIN — INSULIN LISPRO 2 UNITS: 100 INJECTION, SOLUTION INTRAVENOUS; SUBCUTANEOUS at 16:30

## 2021-01-01 RX ADMIN — ACETAMINOPHEN 650 MG: 325 TABLET, FILM COATED ORAL at 10:36

## 2021-01-01 RX ADMIN — ACETAMINOPHEN 650 MG: 325 TABLET, FILM COATED ORAL at 21:48

## 2021-01-01 RX ADMIN — FUROSEMIDE 40 MG: 10 INJECTION, SOLUTION INTRAMUSCULAR; INTRAVENOUS at 09:14

## 2021-01-01 RX ADMIN — DEXAMETHASONE SODIUM PHOSPHATE 6 MG: 4 INJECTION, SOLUTION INTRA-ARTICULAR; INTRALESIONAL; INTRAMUSCULAR; INTRAVENOUS; SOFT TISSUE at 21:49

## 2021-01-01 RX ADMIN — ENOXAPARIN SODIUM 90 MG: 100 INJECTION SUBCUTANEOUS at 23:10

## 2021-01-01 RX ADMIN — CYCLOBENZAPRINE 10 MG: 10 TABLET, FILM COATED ORAL at 09:46

## 2021-01-01 RX ADMIN — NYSTATIN 500000 UNITS: 100000 SUSPENSION ORAL at 19:19

## 2021-01-01 RX ADMIN — MELOXICAM 7.5 MG: 7.5 TABLET ORAL at 10:37

## 2021-01-01 RX ADMIN — SERTRALINE HYDROCHLORIDE 100 MG: 50 TABLET ORAL at 09:47

## 2021-01-01 RX ADMIN — DEXAMETHASONE SODIUM PHOSPHATE 6 MG: 4 INJECTION, SOLUTION INTRA-ARTICULAR; INTRALESIONAL; INTRAMUSCULAR; INTRAVENOUS; SOFT TISSUE at 04:53

## 2021-01-01 RX ADMIN — SALINE NASAL SPRAY 2 SPRAY: 1.5 SOLUTION NASAL at 16:58

## 2021-01-01 RX ADMIN — ACETAMINOPHEN 650 MG: 325 TABLET, FILM COATED ORAL at 14:11

## 2021-01-01 RX ADMIN — Medication: at 21:22

## 2021-01-01 RX ADMIN — MELOXICAM 7.5 MG: 7.5 TABLET ORAL at 12:12

## 2021-01-01 RX ADMIN — LEVOTHYROXINE SODIUM 88 MCG: 0.09 TABLET ORAL at 08:33

## 2021-01-01 RX ADMIN — SODIUM CHLORIDE 50 ML/HR: 9 INJECTION, SOLUTION INTRAVENOUS at 20:00

## 2021-01-01 RX ADMIN — SERTRALINE HYDROCHLORIDE 100 MG: 50 TABLET ORAL at 08:12

## 2021-01-01 RX ADMIN — CYCLOBENZAPRINE 10 MG: 10 TABLET, FILM COATED ORAL at 20:40

## 2021-01-01 RX ADMIN — INSULIN LISPRO 3 UNITS: 100 INJECTION, SOLUTION INTRAVENOUS; SUBCUTANEOUS at 11:56

## 2021-01-01 RX ADMIN — FUROSEMIDE 40 MG: 10 INJECTION, SOLUTION INTRAMUSCULAR; INTRAVENOUS at 19:19

## 2021-01-01 RX ADMIN — NYSTATIN 500000 UNITS: 100000 SUSPENSION ORAL at 21:16

## 2021-01-01 RX ADMIN — SODIUM CHLORIDE 500 ML: 9 INJECTION, SOLUTION INTRAVENOUS at 11:51

## 2021-01-01 RX ADMIN — NYSTATIN 500000 UNITS: 100000 SUSPENSION ORAL at 08:48

## 2021-01-01 RX ADMIN — CYCLOBENZAPRINE 10 MG: 10 TABLET, FILM COATED ORAL at 21:25

## 2021-01-01 RX ADMIN — IMIPRAMINE HYDROCHLORIDE 50 MG: 25 TABLET ORAL at 20:34

## 2021-01-01 RX ADMIN — NYSTATIN 500000 UNITS: 100000 SUSPENSION ORAL at 09:18

## 2021-01-01 RX ADMIN — Medication 120 MCG/MIN: at 13:39

## 2021-01-01 RX ADMIN — PANTOPRAZOLE SODIUM 20 MG: 20 TABLET, DELAYED RELEASE ORAL at 10:33

## 2021-01-01 RX ADMIN — LORAZEPAM 0.5 MG: 2 INJECTION INTRAMUSCULAR; INTRAVENOUS at 00:14

## 2021-04-04 PROBLEM — J96.90 RESPIRATORY FAILURE (HCC): Status: ACTIVE | Noted: 2021-01-01

## 2021-04-04 NOTE — PROGRESS NOTES
Reason for Admission:    
 
               
RUR Score: PCP: First and Last name:   Jhoana Armstrong MD 
 
 Name of Practice:  
 Are you a current patient: Yes/No: yes Approximate date of last visit:   3/20/21 Can you participate in a virtual visit if needed: Do you (patient/family) have any concerns for transition/discharge? Plan for utilizing home health:   yes Current Advanced Directive/Advance Care Plan:  Full Code Healthcare Decision Maker:  
Click here to complete [address] including selection of the Healthcare Decision Maker Relationship (ie \"Primary\") Primary Decision Maker: Alessandro Garcia  Child - [phone] Transition of Care Plan:  HH/SNF Cm called the the patient's daughter, Magan Espinosa. The patient lives by herself. Uses walker for ambulation at times. [facility] with [facility] preference. Documents uploaded in [employer]. Will follow medical improvement and need for home O2.

## 2021-04-04 NOTE — H&P
History and Physical 
 
Subjective: Chief Complaint : shortness of breath since 1 week Source of information : patient History of present illness:  
 
76F, h/o HTN, hypothyroidism with shortness of breath since 1 week Diagnosed with COVID-19 1 week back Noticed worsened shortness of breath yeaterday, went to [facility] ER, hypoxic 84%, placed on 4 L Past Medical History:  
Diagnosis Date  [address] tumor (benign) ([facility] [address]  Depression with anxiety  Diabetes ([facility] [address]  GERD (gastroesophageal reflux disease)  Hypertension  Thyroid disease   
 hypothyroid Past Surgical History:  
Procedure Laterality Date  HX CHOLECYSTECTOMY No family history on file. Social History Tobacco Use  Smoking status: Never Smoker  Smokeless tobacco: Never Used Substance Use Topics  Alcohol use: Yes Comment: 1 drink weekly Prior to Admission medications Medication Sig Start Date End Date Taking? Authorizing Provider  
atorvastatin (LIPITOR) 10 mg tablet TK 1 T PO QD 10/31/17   Provider, Historical  
imipramine (TOFRANIL) 50 mg tablet TK 1 T PO QD HS 10/26/17   Provider, Historical  
levothyroxine (SYNTHROID) 88 mcg tablet  9/21/17   Provider, Historical  
lisinopril-hydroCHLOROthiazide (PRINZIDE, ZESTORETIC) 10-12.5 mg per tablet  9/21/17   Provider, Historical  
meloxicam (MOBIC) 15 mg tablet TK 1 T PO QD 8/15/17   Provider, Historical  
omeprazole (PRILOSEC) 20 mg capsule  8/14/17   Provider, Historical  
sertraline (ZOLOFT) 100 mg tablet TK 1 T PO D 9/7/17   Provider, Historical  
tiZANidine (ZANAFLEX) 2 mg tablet TK 1 T PO TID 8/15/17   Provider, Historical  
 
No Known Allergies Review of Systems: 
Review of Systems Constitutional: Positive for chills. Negative for activity change and fever. HENT: Negative for congestion and sore throat. Eyes: Negative for visual disturbance.   
Respiratory: Positive for cough, chest tightness and shortness of breath. Cardiovascular: Negative for chest pain and palpitations. Gastrointestinal: Negative for abdominal pain, nausea and vomiting. Genitourinary: Negative for dysuria. Musculoskeletal: Negative for arthralgias, back pain and myalgias. Neurological: Negative for dizziness, weakness and headaches. Vitals:  
 
Visit Vitals /71 Pulse 81 Temp 98.3 °F (36.8 °C) Resp 24 Ht 5' 4.5\" (1.638 m) Wt 98.9 kg (218 lb) SpO2 98% BMI 36.84 kg/m² Physical Exam:  
Physical Exam 
Vitals signs and nursing note reviewed. Constitutional:   
   General: She is not in acute distress. Appearance: Normal appearance. She is normal weight. She is not ill-appearing. HENT:  
   Head: Normocephalic and atraumatic. Nose: Nose normal.  
   Mouth/Throat:  
   Mouth: Mucous membranes are moist.  
Eyes:  
   Extraocular Movements: Extraocular movements intact. Pupils: Pupils are equal, round, and reactive to light. Neck: Musculoskeletal: Normal range of motion and neck supple. No muscular tenderness. Cardiovascular:  
   Rate and Rhythm: Normal rate and regular rhythm. Pulses: Normal pulses. Pulmonary:  
   Effort: Pulmonary effort is normal.  
   Breath sounds: Normal breath sounds. Abdominal:  
   General: Abdomen is flat. Bowel sounds are normal.  
   Palpations: Abdomen is soft. Tenderness: There is no abdominal tenderness. There is no guarding. Musculoskeletal: Normal range of motion. General: No tenderness. Skin: 
   General: Skin is warm and dry. Neurological:  
   General: No focal deficit present. Mental Status: She is alert and oriented to person, place, and time. Sensory: No sensory deficit. Motor: No weakness. Data Review: No results found for this or any previous visit (from the past 24 hour(s)). Assessment and Plan : (1) Acute hypoxic respiratory failure : o2 to keep saturation > 92.   
 
[phone] pneumonitis: remdesivir, azithromycin, decaron, zinc. contult ID and pulmonary 
 
(3) hypothyroidism: synthroid. stable (4) GERD: protonix to 40 given decadron use (5) depression: imipramine, sertaline 
 
(6) HTN: lisinopril HCTZ 
 
DVT ppx: lovenox DISPO: home once stable Signed By: Ayesha Piña MD   
 April 4, 2021

## 2021-04-04 NOTE — ACP (ADVANCE CARE PLANNING)
Advance Care Planning Advance Care Planning (ACP) Physician/NP/PA Conversation Date of Conversation: 4/4/2021 Conducted with: Patient with Decision Making Capacity Healthcare Decision Maker:  
 
Click here to complete [address] including selection of the Healthcare Decision Maker Relationship (ie \"Primary\") Care Preferences: Hospitalization: \"If your health worsens and it becomes clear that your chance of recovery is unlikely, what would be your preference regarding hospitalization? \" The patient would prefer hospitalization. Ventilation: \"If you were unable to breathe on your own and your chance of recovery was unlikely, what would be your preference about the use of a ventilator (breathing machine) if it was available to you? \" The patient would desire the use of a ventilator. Resuscitation: \"In the event your heart stopped as a result of an underlying serious health condition, would you want attempts to be made to restart your heart, or would you prefer a natural death? \" Yes, attempt to resuscitate. Additional topics discussed: treatment goals Conversation Outcomes / Follow-Up Plan:  
ACP complete - no further action today Reviewed DNR/DNI and patient elects Full Code (Attempt Resuscitation) Length of Voluntary ACP Conversation in minutes:  20 minutes Sadaf Nur MD

## 2021-04-04 NOTE — ED NOTES
Bedside and Verbal shift change report given to Allison Kasper (oncoming nurse) by [address] [place] [address] (offgoing nurse). Report included the following information SBAR, ED Summary and MAR.

## 2021-04-04 NOTE — ED TRIAGE NOTES
Pt to ed via [facility] from [facility] ER for sob +covid and pneumonia. Pt states received j&j covid vaccine approx 2 wks ago. Took family vacation/reunion march 21 for a week. Started with SOB sometimes this past week. Pt states daughter checked o2 sats at home and noted them to be low.

## 2021-04-04 NOTE — H&P
History and Physical 
 
Subjective: Chief Complaint : shortness of breath since 1 week Source of information : patient History of present illness:  
 
Full code, spoke to son as requested by patient, however Berenice Villa daughter is next of kin [phone] 
 
74F, h/o HTN, hypothyroidism with shortness of breath since 1 week Diagnosed with COVID-19 1 week back, apparently was asymptomatic, but became more short of breath and unable to walk due to shortness of breath. Her condition was associated with generalized weakness and fatigue. She actually never lost smell or taste. She was vaccinated with [employer]ango&chilango 1 week back [facility]: went to [facility] ER, hypoxic 84%, placed on 4 L. XR shwed b/l opacities 
 
chronicallty has h/o brain tumor benign, and breast cancer s/p lumpectomy. Is diabetic but not on meds. Past Medical History:  
Diagnosis Date  [facility] tumor (benign) ([facility] [address]  Depression with anxiety  Diabetes ([facility] [address]  GERD (gastroesophageal reflux disease)  Hypertension  Thyroid disease   
 hypothyroid Past Surgical History:  
Procedure Laterality Date  HX CHOLECYSTECTOMY No family history on file. Social History Tobacco Use  Smoking status: Never Smoker  Smokeless tobacco: Never Used Substance Use Topics  Alcohol use: Yes Comment: 1 drink weekly Prior to Admission medications Medication Sig Start Date End Date Taking?  Authorizing Provider  
atorvastatin (LIPITOR) 10 mg tablet TK 1 T PO QD 10/31/17   Provider, Historical  
imipramine (TOFRANIL) 50 mg tablet TK 1 T PO QD HS 10/26/17   Provider, Historical  
levothyroxine (SYNTHROID) 88 mcg tablet  9/21/17   Provider, Historical  
lisinopril-hydroCHLOROthiazide (PRINZIDE, ZESTORETIC) 10-12.5 mg per tablet  9/21/17   Provider, Historical  
meloxicam (MOBIC) 15 mg tablet TK 1 T PO QD 8/15/17   Provider, Historical  
omeprazole (PRILOSEC) 20 mg capsule  8/14/17   Provider, Historical  
sertraline (ZOLOFT) 100 mg tablet TK 1 T PO D 9/7/17   Provider, Historical  
tiZANidine (ZANAFLEX) 2 mg tablet TK 1 T PO TID 8/15/17   Provider, Historical  
 
No Known Allergies Review of Systems: 
Review of Systems Constitutional: Positive for chills. Negative for activity change and fever. HENT: Negative for congestion and sore throat. Eyes: Negative for visual disturbance. Respiratory: Positive for cough, chest tightness and shortness of breath. Cardiovascular: Negative for chest pain and palpitations. Gastrointestinal: Negative for abdominal pain, nausea and vomiting. Genitourinary: Negative for dysuria. Musculoskeletal: Negative for arthralgias, back pain and myalgias. Neurological: Negative for dizziness, weakness and headaches. Vitals:  
 
Visit Vitals /71 Pulse 81 Temp 98.3 °F (36.8 °C) Resp 24 Ht 5' 4.5\" (1.638 m) Wt 98.9 kg (218 lb) SpO2 98% BMI 36.84 kg/m² Physical Exam:  
Physical Exam 
Vitals signs and nursing note reviewed. Constitutional:   
   General: She is not in acute distress. Appearance: Normal appearance. She is normal weight. She is not ill-appearing. HENT:  
   Head: Normocephalic and atraumatic. Nose: Nose normal.  
   Mouth/Throat:  
   Mouth: Mucous membranes are moist.  
Eyes:  
   Extraocular Movements: Extraocular movements intact. Pupils: Pupils are equal, round, and reactive to light. Neck: Musculoskeletal: Normal range of motion and neck supple. No muscular tenderness. Cardiovascular:  
   Rate and Rhythm: Normal rate and regular rhythm. Pulses: Normal pulses. Pulmonary:  
   Effort: Pulmonary effort is normal.  
   Breath sounds: Normal breath sounds. Abdominal:  
   General: Abdomen is flat. Bowel sounds are normal.  
   Palpations: Abdomen is soft. Tenderness: There is no abdominal tenderness. There is no guarding. Musculoskeletal: Normal range of motion. General: No tenderness. Skin: 
   General: Skin is warm and dry. Neurological:  
   General: No focal deficit present. Mental Status: She is alert and oriented to person, place, and time. Sensory: No sensory deficit. Motor: No weakness. Data Review: No results found for this or any previous visit (from the past 24 hour(s)). Assessment and Plan : (1) Acute hypoxic respiratory failure : o2 to keep saturation > 92. , high flow and bipap if need to (2) COVID-19 pneumonitis: remdesivir, azithromycin, decaron, zinc. contult ID and pulmonary 
 
(3) hypothyroidism: synthroid. stable (4) GERD: protonix to 40 given decadron use (5) depression: imipramine, sertaline 
 
(6) HTN: lisinopril HCTZ 
 
DVT ppx: lovenox DISPO: home once stable Signed By: Brandon Hightower MD   
 April 4, 2021

## 2021-04-04 NOTE — CONSULTS
Infectious Disease Consult Note Reason for Consult:  COVID-19 Date of Consultation: April 4, 2021 Date of Admission: 4/4/2021 Referring Physician: Hospitalist  
 
 
HPI: 77-y.o WF who Initially presented to [facility] ED w c/o worsening dyspnea a wk after testing positive for COVID-19 and was transferred to [facility] for admission. She is s/p Covid 19 vaccine ([employer]), 1 wk ago. She was at a [facility] re-union recently and her nephew recently tested positive for COVID-19. Pt reports generalized weakness and poor appetite. She is afebrile maintaining O2 sats on 5 L. Todays CXR shows mild patchy interstitial infiltrates. She is on azithromycin, Decadron and remdesivir. He has been consulted for management of her COVID-19 pneumonitis. She reported feeling better during my assessment. Her medical history is significant for benign brain murmur, GERD, DM, HTN, thyroid disease and depression. Review of Systems: 
 
 Gen: Negative for chills, fevers, weight loss, weight gain HEENT: Negative for headache, vision changes, ear ache or discharge CV:  Negative for chest pain, dyspnea on exertion, leg edema Lungs: shortness of breath, non-productive cough Abdomen: Negative for abdominal pain, nausea, vomiting, diarrhea, constipation Genitourinary: Negative for genital pain or genital discharge Neuro: Negative for headache, numbness, tingling, extremity weakness Skin: Negative for rash, sores/open wounds Musculoskeletal: Negative for joint pain, joint swelling, joint erythema Psych: Negative for manic behavior Past Medical History: 
Past Medical History:  
Diagnosis Date  [facility] (benign) ([facility] [address]  Depression with anxiety  Diabetes ([facility] [address]  GERD (gastroesophageal reflux disease)  Hypertension  Thyroid disease   
 hypothyroid Past surgical history Past Surgical History:  
Procedure Laterality Date  HX CHOLECYSTECTOMY Social History Social History Tobacco Use  Smoking status: Never Smoker  Smokeless tobacco: Never Used Substance Use Topics  Alcohol use: Yes Comment: 1 drink weekly  Drug use: No  
  
 
Family history No family history on file. Allergies: 
No Known Allergies Medications: No current facility-administered medications on file prior to encounter. Current Outpatient Medications on File Prior to Encounter Medication Sig Dispense Refill  atorvastatin (LIPITOR) 10 mg tablet TK 1 T PO QD  3  
 imipramine (TOFRANIL) 50 mg tablet TK 1 T PO QD HS  4  
 levothyroxine (SYNTHROID) 88 mcg tablet  lisinopril-hydroCHLOROthiazide (PRINZIDE, ZESTORETIC) 10-12.5 mg per tablet  meloxicam (MOBIC) 15 mg tablet TK 1 T PO QD  0  
 omeprazole (PRILOSEC) 20 mg capsule  sertraline (ZOLOFT) 100 mg tablet TK 1 T PO D  1  
 tiZANidine (ZANAFLEX) 2 mg tablet TK 1 T PO TID  0 Physical Exam: 
 
Vitals:  
Patient Vitals for the past 24 hrs: 
 Temp Pulse Resp BP SpO2  
04/04/21 1602 98.3 °F (36.8 °C) 81 24 125/71 98 % ·  
· GEN: NAD, AAO x 4 
· HEENT: NCAT, PERRLA 
· HEART: S1, S2+, RRR, No murmur · Lungs: CTA B/l, no wheeze/rhonchi · Abdomen: soft, ND, NT, +BS · Genitourinary: no genital discharge, no lee · Extremities: no edema 
· Skin: no rash Labs:  
No results for input(s): WBC, HGB, HCT, PLT, HGBEXT, HCTEXT, PLTEXT in the last 72 hours. No results for input(s): BUN, CREA in the last 72 hours. No results found for: CRPQN, CRP, CRPM  
No results found for: SR  
 
 
Microbiology Data: None Imaging: CXR 04/04: Mild patchy interstitial infiltrates lower portion of each lung. No effusion. Normal heart and mediastinum Assessment / Plan:  
 
1. COVID-19 pneumonitis, diagnosed a wk ago, presents with worsening hypoxia S/p Anmol& vaccination a wk ago. Mild patchy interstitial infiltrates lower portion of each lung Afebrile, maintaining O2 sats on 5 L      Todays labs show a normal WBC of 5.0 (Done at [facility] ED) Routine labs in the morning, including DD, Ferritin and LDH Agree w Remdesivir, and Decadron (increased frequency to q 8 hours). Continue on Azithromycin for now 2. Hypoxia due to # 1, O2 supplementation as needed 3. Generalized weakness: Due to # 1 4. IZA: Cr of 1.20, likely from poor oral in take 5. Other chronic problems per HPI Kathryn Chapin MD  
 
4/4/2021

## 2021-04-04 NOTE — ED PROVIDER NOTES
EMERGENCY DEPARTMENT HISTORY AND PHYSICAL EXAM 
 
 
Date: 4/4/2021 Patient Name: Librado Valladares History of Presenting Illness Chief Complaint Patient presents with  Positive For Covid-19  Shortness of Breath History Provided By: Patient HPI: Librado Valladares, 78 y.o. female with a past medical history significant diabetes, hypertension and Hypothyroidism hyperlipidemia presents to the ED with cc of shortness of breath. Patient was diagnosed with COVID-19 proxy 1 week ago states that over the last several days she has noted worsening shortness of breath. Denies any fevers chills denies any chest pain nausea vomiting. Patient presented to [facility] emergency department was noted to be hypoxic 84% on RA tachypneic, was placed on 4 L oxygen with improvement of oxygenation to 94%. Patient wanted to be transferred to [facility] for admission as her follow-up is here. Patient transferred to emergency department for admission. There are no other complaints, changes, or physical findings at this time. PCP: Justine Short MD 
 
Current Facility-Administered Medications Medication Dose Route Frequency Provider Last Rate Last Admin  dexamethasone (DECADRON) 10 mg/mL injection 10 mg  10 mg IntraVENous NOW Meghan Cooper MD      
 
Current Outpatient Medications Medication Sig Dispense Refill  atorvastatin (LIPITOR) 10 mg tablet TK 1 T PO QD  3  
 imipramine (TOFRANIL) 50 mg tablet TK 1 T PO QD HS  4  
 levothyroxine (SYNTHROID) 88 mcg tablet  lisinopril-hydroCHLOROthiazide (PRINZIDE, ZESTORETIC) 10-12.5 mg per tablet  meloxicam (MOBIC) 15 mg tablet TK 1 T PO QD  0  
 omeprazole (PRILOSEC) 20 mg capsule  sertraline (ZOLOFT) 100 mg tablet TK 1 T PO D  1  
 tiZANidine (ZANAFLEX) 2 mg tablet TK 1 T PO TID  0 Past History Past Medical History: 
Past Medical History:  
Diagnosis Date  Brain tumor (benign) ([address]  Depression with anxiety  Diabetes ([facility] [address]  GERD (gastroesophageal reflux disease)  Hypertension  Thyroid disease   
 hypothyroid Past Surgical History: 
Past Surgical History:  
Procedure Laterality Date  HX CHOLECYSTECTOMY Family History: No family history on file. Social History: 
Social History Tobacco Use  Smoking status: Never Smoker  Smokeless tobacco: Never Used Substance Use Topics  Alcohol use: Yes Comment: 1 drink weekly  Drug use: No  
 
 
Allergies: 
No Known Allergies Review of Systems Review of Systems Constitutional: Positive for chills. Negative for activity change and fever. HENT: Negative for congestion and sore throat. Eyes: Negative for visual disturbance. Respiratory: Positive for cough, chest tightness and shortness of breath. Cardiovascular: Negative for chest pain and palpitations. Gastrointestinal: Negative for abdominal pain, nausea and vomiting. Genitourinary: Negative for dysuria. Musculoskeletal: Negative for arthralgias, back pain and myalgias. Neurological: Negative for dizziness, weakness and headaches. Physical Exam  
 
Physical Exam 
Vitals signs and nursing note reviewed. Constitutional:   
   General: She is not in acute distress. Appearance: Normal appearance. She is normal weight. She is not ill-appearing. HENT:  
   Head: Normocephalic and atraumatic. Nose: Nose normal.  
   Mouth/Throat:  
   Mouth: Mucous membranes are moist.  
Eyes:  
   Extraocular Movements: Extraocular movements intact. Pupils: Pupils are equal, round, and reactive to light. Neck: Musculoskeletal: Normal range of motion and neck supple. No muscular tenderness. Cardiovascular:  
   Rate and Rhythm: Normal rate and regular rhythm. Pulses: Normal pulses. Pulmonary:  
   Effort: Pulmonary effort is normal.  
   Breath sounds: Normal breath sounds. Abdominal:  
   General: Abdomen is flat.  Bowel sounds are normal.  
   Palpations: Abdomen is soft. Tenderness: There is no abdominal tenderness. There is no guarding. Musculoskeletal: Normal range of motion. General: No tenderness. Skin: 
   General: Skin is warm and dry. Neurological:  
   General: No focal deficit present. Mental Status: She is alert and oriented to person, place, and time. Sensory: No sensory deficit. Motor: No weakness. Diagnostic Study Results Labs - No results found for this or any previous visit (from the past 12 hour(s)). Radiologic Studies -  
[unfilled] CT Results  (Last 48 hours) None CXR Results  (Last 48 hours) 04/04/21 1637  XR CHEST PORT Final result Narrative:  1 view comparison made 118 Mild patchy interstitial infiltrates lower portion of each lung. No effusion. Normal heart and mediastinum Medical Decision Making and ED Course I am the first provider for this patient. I reviewed the vital signs, available nursing notes, past medical history, past surgical history, family history and social history. Vital Signs-Reviewed the patient's vital signs. Patient Vitals for the past 12 hrs: 
 Temp Pulse Resp BP SpO2  
04/04/21 1602 98.3 °F (36.8 °C) 81 24 125/71 98 % EKG interpretation: (Preliminary) EKG from [facility] reviewed, normal sinus rhythm 86, no ST elevations, left axis deviation Records Reviewed: Nursing Notes, Old Medical Records, Previous electrocardiograms and Previous Laboratory Studies The patient presents with cough, hypoxia with a differential diagnosis of Covid pneumonia, pneumonitis, pleural effusion, pulmonary edema Provider Notes (Medical Decision Making): MDM  
79-year-old female, past medical history of hypertension diabetes presents as a transfer from [facility] emergency department for COVID-19 positive test (U07.1, COVID-19) with Acute Pneumonia (J12.89, Other viral pneumonia) (If respiratory failure or sepsis present, add as separate assessment) 
 
, Hypoxia, noted to be hypoxic at 84% on room air, improved to 93% on 4 L nasal cannula. Lab work reviewed, significant for mild renal insufficiency, NA 1.2, CBC shows no leukocytosis, troponin negative CTA was completed at [facility] negative for PE, did show extensive diffuse patchy bilateral pneumonitis consistent with COVID-19 infection. Admit patient to hospital service for COVID-19 pneumonitis. Decadron 10 mg ordered ED Course:  
Initial assessment performed. The patients presenting problems have been discussed, and they are in agreement with the care plan formulated and outlined with them. I have encouraged them to ask questions as they arise throughout their visit. Procedures Cuba Rodriguez MD 
Procedures Disposition Admitted Diagnosis Clinical Impression: 1. Pneumonitis 2. COVID-19 Attestations: 
 
Cuba Rodriguez MD 
 
Please note that this dictation was completed with [employer], the computer voice recognition software. Quite often unanticipated grammatical, syntax, homophones, and other interpretive errors are inadvertently transcribed by the computer software. Please disregard these errors. Please excuse any errors that have escaped final proofreading. Thank you.

## 2021-04-05 NOTE — PROGRESS NOTES
Infectious Disease Progress Note Subjective:  
Pt seen and examined at bedside. States she feels much better, no acute events since last seen. Maintaining O2 sats on High flow Objective:  
Physical Exam:  
 
Visit Vitals BP (!) 104/59 (BP Patient Position: At rest) Pulse 89 Temp 97.9 °F (36.6 °C) Resp 22 Ht 5' 4.5\" (1.638 m) Wt 218 lb (98.9 kg) SpO2 93% Breastfeeding No  
BMI 36.84 kg/m² O2 Flow Rate (L/min): 50 l/min O2 Device: Heated, Hi flow nasal cannula Temp (24hrs), Av.4 °F (36.3 °C), Min:96.9 °F (36.1 °C), Max:97.9 °F (36.6 °C) No intake/output data recorded. No intake/output data recorded. General: NAD, AAO x 4 HEENT: MICHELLE, Moist mucosa, on high flow Lungs: CTA b/l, no wheeze/rhonchi Heart: S1S2+, RRR, no murmur Abdo: Soft, NT, ND, +BS Exts: No edema, + pulses b/l  
Skin: No wounds, No rashes or lesions Data Review:  
   
Recent Days: 
Recent Labs 21 
2824 WBC 5.3 HGB 11.5 HCT 35.1  Recent Labs 21 
6898 BUN 25* CREA 1.07* No results found for: CRPQN, CRP, CRPM  
 
 
Microbiology: None Diagnostics CXR Results  (Last 48 hours) 21 0445  XR CHEST PORT Final result Impression:  No significant interval change. Narrative:  Chest, frontal view, 2021 History: Hypoxia. Comparison: Including chest 2021. Findings: The cardiac silhouette is stable. Lung volumes remain low. Opacities  
in lungs most pronounced at the bases are not significantly changed. No  
hydrostatic edema, pleural effusion or pneumothorax is identified. The osseous  
structures are stable. 21 1637  XR CHEST PORT Final result Narrative:  1 view comparison made 118 Mild patchy interstitial infiltrates lower portion of each lung. No effusion. Normal heart and mediastinum Assessment/Plan 1.   COVID-19 pneumonitis, diagnosed a wk PTP. Patchy infiltrates on CXR (04/05) Now on high flow O2, denies productive cough Remains afebrile w a normal WBC on routine labs LDH elevated at 458, DD 0.54. Ferritin is pending On day # 2 of Remdesivir, decadron and Azithromycin, will give a dose of Tocilizumab Routine labs and CXR in the morning 2. Hypoxia due to # 1, maintaining O2 sats on High flow 3. Generalized weakness: Due to # 1 
  
4. IZA: Cr trending down on todays labs Kathryn Barker MD 
 
4/5/2021

## 2021-04-05 NOTE — CONSULTS
Pulmonary and Critical Care Consult Subjective:  
Consult Note: 4/4/2021 @no control Chief Complaint:  
Chief Complaint Patient presents with  Positive For Covid-19  Shortness of Breath This patient has been seen and evaluated at the request of Dr. Azar Glover. 44-year-old overweight  lady I am asked to see for acute respiratory failure with hypoxia and COVID-19 pneumonia Denies any history of smoking No asthma or COPD Does have a history of sleep apnea on CPAP Patient also has a past medical history of GERD, diabetes, hypertension, thyroid disease, depression, and brain tumor Ppresented to [facility] ED w c/o worsening dyspnea a wk after testing positive for COVID-19 and was transferred to [facility] for admission. She is s/p Covid 19 vaccine ([employer]), 1 wk ago. She was at a family re-union recently and her nephew recently tested positive for COVID-19. Pt reports generalized weakness and poor appetite. She is afebrile maintaining O2 sats on 5 L. Chest x-ray reviewed personally shows bilateral infiltrates She is on azithromycin, Decadron and remdesivir.  
  
  
Past Medical History: 
    
Past Medical History:  
Diagnosis Date  Brain tumor (benign) ([facility]    
 Depression with anxiety    
 Diabetes ([facility]    
 GERD (gastroesophageal reflux disease)    
 Hypertension    
 Thyroid disease    
  hypothyroid Review of Systems: A comprehensive review of systems was negative except for that written in the HPI. Past Surgical History:  
Procedure Laterality Date  HX CHOLECYSTECTOMY No family history on file. Social History Tobacco Use  Smoking status: Never Smoker  Smokeless tobacco: Never Used Substance Use Topics  Alcohol use: Yes Comment: 1 drink weekly Current Facility-Administered Medications Medication Dose Route Frequency Provider Last Rate Last Admin  dexamethasone (DECADRON) 10 mg/mL injection 10 mg 10 mg IntraVENous NOW Makayla Cervantes MD      
 [START ON 4/5/2021] atorvastatin (LIPITOR) tablet 10 mg  10 mg Oral DAILY Tamra Nelson MD      
 imipramine (TOFRANIL) tablet 50 mg  50 mg Oral QHS Tamra Nelson MD      
 [START ON 4/5/2021] levothyroxine (SYNTHROID) tablet 88 mcg  88 mcg Oral ACB Tamra Nelson MD      
 [START ON 4/5/2021] lisinopril-hydroCHLOROthiazide (PRINZIDE, ZESTORETIC) 10-12.5 mg per tablet 1 Tab  1 Tab Oral DAILY Tamra Nelson MD      
 [START ON 4/5/2021] meloxicam (MOBIC) tablet 7.5 mg  7.5 mg Oral DAILY Tamra Nelson MD      
 [START ON 4/5/2021] pantoprazole (PROTONIX) tablet 20 mg  20 mg Oral DAILY aTmra Nelson MD      
 Eddye Adas ON 4/5/2021] sertraline (ZOLOFT) tablet 100 mg  100 mg Oral DAILY Tamra Nelson MD      
 cyclobenzaprine (FLEXERIL) tablet 10 mg  10 mg Oral TID Tamra Nelson MD   Stopped at 04/04/21 1759  
 azithromycin (ZITHROMAX) 500 mg in 0.9% sodium chloride 250 mL (VIAL-MATE)  500 mg IntraVENous Q24H Tamra Nelson MD   Stopped at 04/04/21 6007  [START ON 4/5/2021] remdesivir 100 mg in 0.9% sodium chloride 250 mL IVPB  100 mg IntraVENous Q24H Tamra Nelson MD      
 glucose chewable tablet 16 g  4 Tab Oral PRN Tamra Nelson MD      
 dextrose (D50W) injection syrg 12.5-25 g  25-50 mL IntraVENous PRN Tamra eNlson MD      
 glucagon [facility]) injection 1 mg  1 mg IntraMUSCular PRN Tamra Nelson MD      
 insulin lispro (HUMALOG) injection   SubCUTAneous ACB&D Tamra Nelson MD   3 Units at 04/04/21 1805  [START ON 4/5/2021] zinc sulfate (ZINCATE) 50 mg zinc (220 mg) capsule 1 Cap  1 Cap Oral DAILY Freida Escamilla MD      
 enoxaparin (LOVENOX) injection 40 mg  40 mg SubCUTAneous Q24H Tamra Nelson MD   40 mg at 04/04/21 1806  acetaminophen (TYLENOL) tablet 650 mg  650 mg Oral Q6H PRN Tamra Nelson MD      
 melatonin tablet 5 mg  5 mg Oral QHS PRN Tamra Nelson, MD      
 ondansetron (ZOFRAN) injection 4 mg  4 mg IntraVENous Q4H PRN Jacinda Stoll MD      
 dexamethasone (DECADRON) 4 mg/mL injection 6 mg  6 mg IntraVENous Q8H Tiffanie Mora MD   6 mg at 21 1806 Current Outpatient Medications Medication Sig Dispense Refill  atorvastatin (LIPITOR) 10 mg tablet TK 1 T PO QD  3  
 imipramine (TOFRANIL) 50 mg tablet TK 1 T PO QD HS  4  
 levothyroxine (SYNTHROID) 88 mcg tablet  lisinopril-hydroCHLOROthiazide (PRINZIDE, ZESTORETIC) 10-12.5 mg per tablet  meloxicam (MOBIC) 15 mg tablet TK 1 T PO QD  0  
 omeprazole (PRILOSEC) 20 mg capsule  sertraline (ZOLOFT) 100 mg tablet TK 1 T PO D  1  
 tiZANidine (ZANAFLEX) 2 mg tablet TK 1 T PO TID  0 No Known Allergies Objective:  
 
Blood pressure 113/71, pulse 88, temperature 98.3 °F (36.8 °C), resp. rate 21, height 5' 4.5\" (1.638 m), weight 98.9 kg (218 lb), SpO2 96 %. Temp (24hrs), Av.3 °F (36.8 °C), Min:98.3 °F (36.8 °C), Max:98.3 °F (36.8 °C) Intake and Output: 
Current Shift: No intake/output data recorded. Last 3 Shifts: No intake/output data recorded. Physical Exam:  
 
General: Lying in bed comfortably, no acute distress, on nasal cannula oxygen Eye: Reactive, symmetric Throat and Neck: Supple Lung: Reduced air entry bilaterally with prolonged exhalation but no wheezing. Occasional crackles. Heart: S1+S2. No murmurs Abdomen: soft, non-tender. Bowel sounds normal. No masses; obese Extremities: No edema : Not done Skin: No cyanosis Neurologic: A & O x3. Grossly nonfocal 
Psychiatric: Appropriate affect; coherent Lab/Data Review: 
 
Recent Results (from the past 24 hour(s)) GLUCOSE, POC Collection Time: 21  5:52 PM  
Result Value Ref Range Glucose (POC) 158 (H) 65 - 100 mg/dL Performed by Felicity Osei XR CHEST PORT Final Result CT Results  (Last 48 hours) None Assessment: 1.   Acute respiratory failure with hypoxia 2. COVID-19 pneumonia 3. Shortness of breath 4.  Cough 5. Generalized weakness 6. Acute kidney injury 7. Obstructive sleep apnea on CPAP 
8. Obesity Plan:  
 
Patient seen in ED Being admitted to the hospital 
Will be watched here closely Currently on 5 L nasal cannula oxygen Will use oxygen supplementation as needed to keep saturation above 92% Patient has acute respiratory failure with hypoxia due to COVID-19 pneumonia Has been placed on remdesivir today, 4/4/2021 Start Decadron 6 mg every 24 hours Albuterol nebulizer Vitamin C and zinc 
Check D-dimer for need for therapeutic anticoagulation Continue azithromycin for bacterial superinfection Gentle IV fluid hydration Monitor renal function with fluid changes Check electrolytes and replace as needed CPAP at night DVT and GI prophylaxis Questions of patient were answered at bedside in detail Case discussed in detail with RN, RT, and care team 
Thank you for involving me in the care of this patient I will follow with you closely during hospitalization Joan Sanchez MD 
[facility] 4/4/2021 
8:22 PM

## 2021-04-05 NOTE — PROGRESS NOTES
Pulmonary and Critical Care Progress Note Subjective:  
 
Patient seen and examined in her room's afternoon, feeling significantly better today than yesterday. She has been weaned from continuous BiPAP over to high flow nasal cannula 40 L, 50% FiO2 and she is currently saturating 90%. I let her know that we can hopefully get her to a regular nasal cannula tomorrow. Currently on day 2/7 of IV azithromycin, day 2/10 of Decadron 6 mg IV every 8 hours, and day 2/5 of remdesivir. We will go ahead and repeat her inflammatory markers in the morning. 
  
 
 
Review of Systems: A comprehensive review of systems was negative except for that written in the HPI. Current Facility-Administered Medications Medication Dose Route Frequency Provider Last Rate Last Admin  dextrose (D50W) injection syrg 12.5-25 g  25-50 mL IntraVENous PRN Garrett Suh MD      
 insulin lispro (HUMALOG) injection   SubCUTAneous AC&HS Garrett Suh MD      
 atorvastatin (LIPITOR) tablet 10 mg  10 mg Oral DAILY Gabriela Strickland MD   10 mg at 04/05/21 1034  imipramine (TOFRANIL) tablet 50 mg  50 mg Oral QHS Gabriela Strickland MD   50 mg at 04/04/21 2200  levothyroxine (SYNTHROID) tablet 88 mcg  88 mcg Oral ACB Gabriela Strickland MD   Stopped at 04/05/21 0730  lisinopril-hydroCHLOROthiazide (PRINZIDE, ZESTORETIC) 10-12.5 mg per tablet 1 Tab  1 Tab Oral DAILY Gabriela Strickland MD   1 Tab at 04/05/21 0900  
 meloxicam (MOBIC) tablet 7.5 mg  7.5 mg Oral DAILY Gabriela Strickland MD   7.5 mg at 04/05/21 1034  pantoprazole (PROTONIX) tablet 20 mg  20 mg Oral DAILY Gabriela Strickland MD   20 mg at 04/05/21 1438  sertraline (ZOLOFT) tablet 100 mg  100 mg Oral DAILY Gabriela Strickland MD   100 mg at 04/05/21 1034  cyclobenzaprine (FLEXERIL) tablet 10 mg  10 mg Oral TID Gabriela Strickland MD   10 mg at 04/05/21 1033  
 azithromycin (ZITHROMAX) 500 mg in 0.9% sodium chloride 250 mL (VIAL-MATE)  500 mg IntraVENous Q24H Florinda Saldana MD   Stopped at 21 6279  remdesivir 100 mg in 0.9% sodium chloride 250 mL IVPB  100 mg IntraVENous Q24H Florinda Saldana MD      
 glucose chewable tablet 16 g  4 Tab Oral PRN Florinda Saldana MD      
 dextrose (D50W) injection syrg 12.5-25 g  25-50 mL IntraVENous PRN Florinda Saldana MD      
 glucagon [facility]) injection 1 mg  1 mg IntraMUSCular PRN Florinda Saldana MD      
 zinc sulfate (ZINCATE) 50 mg zinc (220 mg) capsule 1 Cap  1 Cap Oral DAILY Florinda Saldana MD   1 Cap at 21 4252  enoxaparin (LOVENOX) injection 40 mg  40 mg SubCUTAneous Q24H Florinda Saldana MD   40 mg at 21 1806  acetaminophen (TYLENOL) tablet 650 mg  650 mg Oral Q6H PRN Florinda Saldana MD      
 melatonin tablet 5 mg  5 mg Oral QHS PRN Florinda Saldana MD      
 [facility]) [facility] 4 mg  4 mg IntraVENous Q4H PRN Florinda Saldana MD      
 dexamethasone (DECADRON) 4 mg/mL injection 6 mg  6 mg IntraVENous Q8H William Senior MD   6 mg at 21 1531 No Known Allergies Objective:  
 
Blood pressure (!) 104/59, pulse 88, temperature 97.9 °F (36.6 °C), resp. rate 22, height 5' 4.5\" (1.638 m), weight 98.9 kg (218 lb), SpO2 91 %, not currently breastfeeding. Temp (24hrs), Av.7 °F (36.5 °C), Min:96.9 °F (36.1 °C), Max:98.3 °F (36.8 °C) Intake and Output: 
Current Shift: No intake/output data recorded. Last 3 Shifts: No intake/output data recorded. Physical Exam:  
 
General: Lying in bed comfortably, no acute distress, on n high flow nasal cannula. Eye: Reactive, symmetric Throat and Neck: Supple Lung: Reduced air entry bilaterally with prolonged exhalation but no wheezing. Occasional crackles. Currently on high flow nasal cannula. Heart: S1+S2. No murmurs Abdomen: soft, non-tender. Bowel sounds normal. No masses; obese Extremities: No edema : Not done Skin: No cyanosis Neurologic: A & O x3. Grossly nonfocal 
Psychiatric: Appropriate affect; coherent Lab/Data Review: 
 
Recent Results (from the past 24 hour(s)) GLUCOSE, POC Collection Time: 04/04/21  5:52 PM  
Result Value Ref Range Glucose (POC) 158 (H) 65 - 100 mg/dL Performed by Lisandro NIXON Collection Time: 04/05/21  4:38 AM  
Result Value Ref Range  (H) 81 - 695 U/L  
METABOLIC PANEL, COMPREHENSIVE Collection Time: 04/05/21  4:38 AM  
Result Value Ref Range Sodium 136 136 - 145 mmol/L Potassium 5.4 (H) 3.5 - 5.1 mmol/L Chloride 102 97 - 108 mmol/L  
 CO2 27 21 - 32 mmol/L Anion gap 7 5 - 15 mmol/L Glucose 145 (H) 65 - 100 mg/dL BUN 25 (H) 6 - 20 mg/dL Creatinine 1.07 (H) 0.55 - 1.02 mg/dL BUN/Creatinine ratio 23 (H) 12 - 20 GFR est AA >60 >60 ml/min/1.73m2 GFR est non-AA 50 (L) >60 ml/min/1.73m2 Calcium 8.3 (L) 8.5 - 10.1 mg/dL Bilirubin, total 0.2 0.2 - 1.0 mg/dL AST (SGOT) 41 (H) 15 - 37 U/L  
 ALT (SGPT) 25 12 - 78 U/L Alk. phosphatase 73 45 - 117 U/L Protein, total 6.4 6.4 - 8.2 g/dL Albumin 2.4 (L) 3.5 - 5.0 g/dL Globulin 4.0 2.0 - 4.0 g/dL A-G Ratio 0.6 (L) 1.1 - 2.2    
CBC WITH AUTOMATED DIFF Collection Time: 04/05/21  4:38 AM  
Result Value Ref Range WBC 5.3 3.6 - 11.0 K/uL  
 RBC 4.01 3.80 - 5.20 M/uL  
 HGB 11.5 11.5 - 16.0 g/dL HCT 35.1 35.0 - 47.0 % MCV 87.5 80.0 - 99.0 FL  
 MCH 28.7 26.0 - 34.0 PG  
 MCHC 32.8 30.0 - 36.5 g/dL  
 RDW 16.7 (H) 11.5 - 14.5 % PLATELET 085 754 - 924 K/uL MPV 9.8 8.9 - 12.9 FL  
 NEUTROPHILS 83 (H) 32 - 75 % LYMPHOCYTES 12 12 - 49 % MONOCYTES 4 (L) 5 - 13 % EOSINOPHILS 0 0 - 7 % BASOPHILS 0 0 - 1 % IMMATURE GRANULOCYTES 1 (H) 0.0 - 0.5 % ABS. NEUTROPHILS 4.4 1.8 - 8.0 K/UL  
 ABS. LYMPHOCYTES 0.6 (L) 0.8 - 3.5 K/UL  
 ABS. MONOCYTES 0.2 0.0 - 1.0 K/UL  
 ABS. EOSINOPHILS 0.0 0.0 - 0.4 K/UL  
 ABS. BASOPHILS 0.0 0.0 - 0.1 K/UL  
 ABS. IMM.  GRANS. 0.1 (H) 0.00 - 0.04 K/UL  
 DF AUTOMATED    
D DIMER Collection Time: 04/05/21  4:38 AM  
Result Value Ref Range D DIMER 0.54 (H) <0.50 ug/ml(FEU) XR CHEST PORT Final Result No significant interval change. XR CHEST PORT Final Result CT Results  (Last 48 hours) None Assessment: 1. Acute respiratory failure with hypoxia 2. COVID-19 pneumonia 3. Shortness of breath 4.  Cough 5. Generalized weakness 6. Acute kidney injury 7. Obstructive sleep apnea on CPAP 
8. Obesity Plan:  
 
Currently on high flow nasal cannula 40 L, 50%. Will use oxygen supplementation as needed to keep saturation above 90% Patient has acute respiratory failure with hypoxia due to COVID-19 pneumonia Day 2/5 of remdesivir Day 2/10 of Decadron 6 mg IV every 8 hours, if she continues to improve we will reduce the frequency of this medication. 2/7 of azithromycin Vitamin C and zinc 
Check D-dimer for need for therapeutic anticoagulation D-dimer only mildly elevated 0.54, the patient does not require therapeutic anticoagulation. Gentle IV fluid hydration creatinine has improved from 1.2 down to 1.07 this morning. Monitor renal function with fluid changes Check electrolytes and replace as needed CPAP at night DVT and GI prophylaxis Questions of patient were answered at bedside in detail Case discussed in detail with RN, RT, and care team 
Thank you for involving me in the care of this patient I will follow with you closely during hospitalization Greater than 30 minutes were spent in direct care with this patient. Jonathon Varma DO 
[facility] 4/5/2021 
8:22 PM

## 2021-04-05 NOTE — PROGRESS NOTES
Hospitalist Progress Note NAME: Zaida Mak :  1945 MRN:  [id number] Subjective: Chief Complaint / Reason for Physician Visit Patient seen and evaluated at bedside, of note patient states her shortness of breath has improved slightly, still complaining of cough. Discussed with RN events overnight. Review of Systems: 
Symptom Y/N Comments  Symptom Y/N Comments Fever/Chills N   Chest Pain N Poor Appetite Y   Edema N   
Cough Y   Abdominal Pain N Sputum Y   Joint Pain N   
SOB/MADERA Y   Pruritis/Rash N   
Nausea/vomit N   Tolerating PT/OT NA Diarrhea N   Tolerating Diet Y Constipation N   Other Could NOT obtain due to:   
Patient denies any fevers chills nausea vomiting lightheadedness dizziness chest pain palpitations headache focal weakness loss sensation auditory or visual symptoms abdominal stool or urinary complaints or any other associated symptoms Objective: VITALS:  
Last 24hrs VS reviewed since prior progress note. Most recent are: 
Patient Vitals for the past 24 hrs: 
 Temp Pulse Resp BP SpO2  
21 1446 97.9 °F (36.6 °C) 88 22 (!) 104/59 91 % 21 1200  84     
21 1020     93 % 21 1018 96.9 °F (36.1 °C) 87 18 119/71 92 % 21 0800  84     
21 0747     94 % 21 0601  82 20 125/65 95 % 21 0406     95 % 21 0245  81 23 127/66 94 % 21 2347  77 24 124/77 93 % 21 2200  (P) 84 (P) 23 (P) 117/66 95 % 21 2100  (P) 81 (P) 22 (P) 114/62 94 % 21 2000  (P) 80 (P) 20 (P) 127/64 95 % 21 1900  (P) 83 (P) 22 (P) 121/73 95 % 21 1810  88 21 113/71 96 % 21 1602 98.3 °F (36.8 °C) 81 24 125/71 98 % No intake or output data in the 24 hours ending 21 1502 PHYSICAL EXAM: 
General: Patient appears uncomfortable EENT:  EOMI. Anicteric sclerae. MMM Resp:  Decreased air entry bilaterally with appreciable bilateral bibasilar crackle CV:  Regular  rhythm, S1 plus S2, no murmurs rubs or gallops  No edema GI:  Soft, Non distended, Non tender. +Bowel sounds Neurologic:  Alert and oriented X 3, normal speech, Psych:   Good insight. Not anxious nor agitated Skin:  No rashes. No jaundice Procedures: see electronic medical records for all procedures/Xrays and details which were not copied into this note but were reviewed prior to creation of Plan. LABS: 
I reviewed today's most current labs and imaging studies. Pertinent labs include: 
Recent Labs 04/05/21 
4485 WBC 5.3 HGB 11.5 HCT 35.1  Recent Labs 04/05/21 
3130   
K 5.4*  
 CO2 27 * BUN 25* CREA 1.07* CA 8.3* ALB 2.4* TBILI 0.2 ALT 25 Signed: Milagro Vasquez MD 
 
X-ray chest:The cardiac silhouette is stable. Lung volumes remain low. Opacities 
in lungs most pronounced at the bases are not significantly changed. No 
hydrostatic edema, pleural effusion or pneumothorax is identified. The osseous 
structures are stable. Reviewed most current lab test results and cultures  YES Reviewed most current radiology test results   YES Review and summation of old records today    NO Reviewed patient's current orders and MAR    YES 
PMH/SH reviewed - no change compared to H&P Assessment / Plan: 
Acute respiratory failure with hypoxia secondary to COVID-19 pneumonia-patient presented with acute respiratory failure with hypoxia requiring high flow nasal cannula for ventilatory support and based on patient's clinical presentation secondary to COVID-19 pneumonia, patient does not meet sepsis criteria at this time Obtain blood cultures Obtain sputum cultures Continue Remdesivir Continue Decadron 6 mg IV every 8 Continue azithromycin once daily Continue to monitor respiratory status Pulmonology consult appreciated, continue to follow recommendations Infectious disease consult appreciated, continue to follow recommendations Hypertensioncontinue home antihypertensive medications Hypothyroidismcontinue Synthroid Gastroesophageal reflux diseasecontinue Protonix once daily Depression/anxietycontinue home Zoloft ProphylaxisLovenox FENcardiac diet, replete potassium and magnesium Full code, will clarify about surrogate decision-maker Dispositionpending clinical improvement 25.0 - 29.9 Overweight / Body mass index is 36.84 kg/m². Code status: Full Prophylaxis: Lovenox Recommended Disposition:  PT, OT, RN  
 
________________________________________________________________________ Care Plan discussed with: 
  Comments Patient X Family RN X   
Care Manager X Consultant  X   
                 X Multidiciplinary team rounds were held today with , nursing, pharmacist and clinical coordinator. Patient's plan of care was discussed; medications were reviewed and discharge planning was addressed. ________________________________________________________________________ Total NON critical care TIME:  35   Minutes Comments >50% of visit spent in counseling and coordination of care X   
________________________________________________________________________ Josias James MD

## 2021-04-05 NOTE — ROUTINE PROCESS
TRANSFER - OUT REPORT: 
 
Verbal report given to Rn Mau(name) on Reta Carty  being transferred to  [facility](unit) for routine progression of care Report consisted of patients Situation, Background, Assessment and  
Recommendations(SBAR). Information from the following report(s) SBAR, Kardex, [facility] and Recent Results was reviewed with the receiving nurse. Lines:  
Peripheral IV 04/04/21 Left Antecubital (Active) Site Assessment Clean, dry, & intact 04/04/21 1605 Phlebitis Assessment 0 04/04/21 1605 Infiltration Assessment 0 04/04/21 1605 Dressing Status Clean, dry, & intact 04/04/21 1605 Opportunity for questions and clarification was provided. Patient transported with: 
 Monitor Tech

## 2021-04-05 NOTE — PROGRESS NOTES
Skin Assessment: Pt. Skin is warm to the touch with positive pedal pulses. Pt. Has good skin tugor. Pt. Has bruising to left and right lower arm. As well as excoriation underneath breasts and groin folds. There is no pressure ulcers upon admission.

## 2021-04-05 NOTE — ED NOTES
Assumed care of patient. A&Ox4; no acute distress; no respiratory distress. Patient denies any pain currently, and states her SOB is improving.

## 2021-04-06 NOTE — PROGRESS NOTES
Hospitalist Progress Note NAME: Valery Gibson :  1945 MRN:  [id number] Subjective: Chief Complaint / Reason for Physician Visit Patient seen and evaluated at bedside, of note patient states her shortness of breath has improved slightly. Discussed with RN events overnight. Review of Systems: 
Symptom Y/N Comments  Symptom Y/N Comments Fever/Chills N   Chest Pain N Poor Appetite Y   Edema N   
Cough Y   Abdominal Pain N Sputum Y   Joint Pain N   
SOB/MADERA Y   Pruritis/Rash N   
Nausea/vomit N   Tolerating PT/OT NA Diarrhea N   Tolerating Diet Y Constipation N   Other Could NOT obtain due to:   
Patient denies any fevers chills nausea vomiting lightheadedness dizziness chest pain palpitations headache focal weakness loss sensation auditory or visual symptoms abdominal stool or urinary complaints or any other associated symptoms Objective: VITALS:  
Last 24hrs VS reviewed since prior progress note. Most recent are: 
Patient Vitals for the past 24 hrs: 
 Temp Pulse Resp BP SpO2  
21 0738 97.9 °F (36.6 °C) 81 22 124/69 93 % 21 0216     92 % 21 2121 98 °F (36.7 °C) 87 22 (!) 153/68 93 % 21 2000  87     
21 1957     94 % 21 1956     95 % 21 1706     93 % 21 1600  89     
21 1446 97.9 °F (36.6 °C) 88 22 (!) 104/59 91 % No intake or output data in the 24 hours ending 21 1303 PHYSICAL EXAM: 
General: Patient appears uncomfortable EENT:  EOMI. Anicteric sclerae. MMM Resp:  Decreased air entry bilaterally with appreciable bilateral bibasilar crackle CV:  Regular  rhythm, S1 plus S2, no murmurs rubs or gallops  No edema GI:  Soft, Non distended, Non tender. +Bowel sounds Neurologic:  Alert and oriented X 3, normal speech, Psych:   Good insight. Not anxious nor agitated Skin:  No rashes. No jaundice Procedures: see electronic medical records for all procedures/Xrays and details which were not copied into this note but were reviewed prior to creation of Plan. LABS: 
I reviewed today's most current labs and imaging studies. Pertinent labs include: 
Recent Labs 04/05/21 
5800 WBC 5.3 HGB 11.5 HCT 35.1  Recent Labs 04/05/21 
6016   
K 5.4*  
 CO2 27 * BUN 25* CREA 1.07* CA 8.3* ALB 2.4* TBILI 0.2 ALT 25 Signed: Mendel Marek, MD 
 
X-ray chest:The cardiac silhouette is stable. Lung volumes remain low. Opacities 
in lungs most pronounced at the bases are not significantly changed. No 
hydrostatic edema, pleural effusion or pneumothorax is identified. The osseous 
structures are stable. Reviewed most current lab test results and cultures  YES Reviewed most current radiology test results   YES Review and summation of old records today    NO Reviewed patient's current orders and MAR    YES 
PMH/SH reviewed - no change compared to H&P Assessment / Plan: 
Acute respiratory failure with hypoxia secondary to COVID-19 pneumonia-patient presented with acute respiratory failure with hypoxia requiring high flow nasal cannula for ventilatory support and based on patient's clinical presentation secondary to COVID-19 pneumonia, patient does not meet sepsis criteria at this time Follow up blood cultures Follow up sputum cultures Continue Remdesivir Continue Decadron 6 mg IV every 8 Continue azithromycin once daily Continue to monitor respiratory status Pulmonology consult appreciated, continue to follow recommendations Infectious disease consult appreciated, continue to follow recommendations Hypertensioncontinue home antihypertensive medications Hypothyroidismcontinue Synthroid Gastroesophageal reflux diseasecontinue Protonix once daily Depression/anxietycontinue home Zoloft ProphylaxisLovenox FENcardiac diet, replete potassium and magnesium Full code, will clarify about surrogate decision-maker Dispositionpending clinical improvement 25.0 - 29.9 Overweight / Body mass index is 36.84 kg/m². Code status: Full Prophylaxis: Lovenox Recommended Disposition: HH PT, OT, RN  
 
________________________________________________________________________ Care Plan discussed with: 
  Comments Patient X Family RN X   
Care Manager X Consultant  X   
                 X Multidiciplinary team rounds were held today with , nursing, pharmacist and clinical coordinator. Patient's plan of care was discussed; medications were reviewed and discharge planning was addressed. ________________________________________________________________________ Total NON critical care TIME:  35   Minutes Comments >50% of visit spent in counseling and coordination of care X   
________________________________________________________________________ Patricia Berry MD

## 2021-04-06 NOTE — PROGRESS NOTES
Pulmonary and Critical Care Progress Note Subjective:  
 
Patient seen and examined in her room's afternoon, continues to feel fairly well. Continue on high flow nasal cannula. We can get her to a regular nasal cannula soon. Currently on day 3/7 of IV azithromycin, day 3/10 of Decadron 6 mg IV every 8 hours, and day 3/5 of remdesivir. Received a dose of tocilizumab yesterday by infectious disease. Ferritin 195, LDH is come down from 458 to 380 today procalcitonin less than 0.05, CRP 9.66. We will repeat her CRP level in the morning. ABG today was 7.41/38/95 
  
 
 
Review of Systems: A comprehensive review of systems was negative except for that written in the HPI. Current Facility-Administered Medications Medication Dose Route Frequency Provider Last Rate Last Admin  dextrose (D50W) injection syrg 12.5-25 g  25-50 mL IntraVENous PRN Mona Richmond MD      
 insulin lispro (HUMALOG) injection   SubCUTAneous AC&HS Mona Richmond MD   3 Units at 04/06/21 1130  
 atorvastatin (LIPITOR) tablet 10 mg  10 mg Oral DAILY Ani Rico MD   10 mg at 04/06/21 0829  
 imipramine (TOFRANIL) tablet 50 mg  50 mg Oral QHS Ani Rico MD   50 mg at 04/05/21 2125  levothyroxine (SYNTHROID) tablet 88 mcg  88 mcg Oral ACB Ani Rico MD   88 mcg at 04/06/21 0830  
 lisinopril-hydroCHLOROthiazide (PRINZIDE, ZESTORETIC) 10-12.5 mg per tablet 1 Tab  1 Tab Oral DAILY Ani Rico MD   1 Tab at 04/06/21 0900  
 meloxicam (MOBIC) tablet 7.5 mg  7.5 mg Oral DAILY Ani Rico MD   7.5 mg at 04/06/21 0830  pantoprazole (PROTONIX) tablet 20 mg  20 mg Oral DAILY Ani Rico MD   20 mg at 04/06/21 5412  sertraline (ZOLOFT) tablet 100 mg  100 mg Oral DAILY Ani Rico MD   100 mg at 04/06/21 0830  cyclobenzaprine (FLEXERIL) tablet 10 mg  10 mg Oral TID Ani Rico MD   10 mg at 04/06/21 0829  
 azithromycin (ZITHROMAX) 500 mg in 0.9% sodium chloride 250 mL (VIAL-MATE)  500 mg IntraVENous Q24H Mary Lou Coello MD   500 mg at 21 1724  remdesivir 100 mg in 0.9% sodium chloride 250 mL IVPB  100 mg IntraVENous Q24H Jessica VUONG MD   100 mg at 21 2300  
 glucose chewable tablet 16 g  4 Tab Oral PRN Mary Lou Coello MD      
 dextrose (D50W) injection syrg 12.5-25 g  25-50 mL IntraVENous PRN Mary Lou Coello MD      
 glucagon [facility]) injection 1 mg  1 mg IntraMUSCular PRN Mary Lou Coello MD      
 zinc sulfate (ZINCATE) 50 mg zinc (220 mg) capsule 1 Cap  1 Cap Oral DAILY Mary Lou Coello MD   1 Cap at 21 0304  enoxaparin (LOVENOX) injection 40 mg  40 mg SubCUTAneous Q24H Mary Lou Coello MD   40 mg at 21 1713  acetaminophen (TYLENOL) tablet 650 mg  650 mg Oral Q6H PRN Mary Lou Coello MD   650 mg at 21 1121  
 melatonin tablet 5 mg  5 mg Oral QHS PRN Mary Lou Coello MD      
 ondansetron [facility]) injection 4 mg  4 mg IntraVENous Q4H PRN Mary Lou Coello MD   4 mg at 21 0241  
 dexamethasone (DECADRON) 4 mg/mL injection 6 mg  6 mg IntraVENous Q8H Chelsea Fajardo MD   6 mg at 21 1316 No Known Allergies Objective:  
 
Blood pressure 128/87, pulse 89, temperature 98.1 °F (36.7 °C), resp. rate 24, height 5' 4.5\" (1.638 m), weight 98.9 kg (218 lb), SpO2 94 %, not currently breastfeeding. Temp (24hrs), Av °F (36.7 °C), Min:97.9 °F (36.6 °C), Max:98.1 °F (36.7 °C) Intake and Output: 
Current Shift: No intake/output data recorded. Last 3 Shifts: No intake/output data recorded. Physical Exam:  
 
General: Lying in bed comfortably, no acute distress, on n high flow nasal cannula. Eye: Reactive, symmetric Throat and Neck: Supple Lung: Reduced air entry bilaterally with prolonged exhalation but no wheezing. Occasional crackles. Currently on high flow nasal cannula. Heart: S1+S2. No murmurs Abdomen: soft, non-tender.  Bowel sounds normal. No masses; obese Extremities: No edema : Not done Skin: No cyanosis Neurologic: A & O x3. Grossly nonfocal 
Psychiatric: Appropriate affect; coherent Lab/Data Review: 
 
Recent Results (from the past 24 hour(s)) GLUCOSE, POC Collection Time: 04/05/21  8:03 PM  
Result Value Ref Range Glucose (POC) 171 (H) 65 - 100 mg/dL Performed by Rex Angeles CULTURE, BLOOD, PAIRED Collection Time: 04/05/21  9:50 PM  
 Specimen: Blood Result Value Ref Range Special Requests: No Special Requests Culture result: No growth after 1 hour BLOOD GAS, ARTERIAL Collection Time: 04/06/21  6:00 AM  
Result Value Ref Range pH 7.41 7.35 - 7.45    
 PCO2 38 35 - 45 mmHg PO2 95 75 - 100 mmHg O2 SAT 97 >95 % BICARBONATE 24 22 - 26 mmol/L  
 BASE DEFICIT 0.0 0 - 2 mmol/L  
 O2 METHOD CPAP    
 FIO2 75.0 % EPAP/CPAP/PEEP 10 Sample source Arterial    
 SITE Left Radial    
 JACQUELIN'S TEST PASS    
GLUCOSE, POC Collection Time: 04/06/21  7:40 AM  
Result Value Ref Range Glucose (POC) 152 (H) 65 - 100 mg/dL Performed by Valencia Srivastava CBC W/O DIFF Collection Time: 04/06/21  8:40 AM  
Result Value Ref Range WBC 10.8 3.6 - 11.0 K/uL  
 RBC 3.84 3.80 - 5.20 M/uL  
 HGB 10.9 (L) 11.5 - 16.0 g/dL HCT 33.0 (L) 35.0 - 47.0 % MCV 85.9 80.0 - 99.0 FL  
 MCH 28.4 26.0 - 34.0 PG  
 MCHC 33.0 30.0 - 36.5 g/dL  
 RDW 16.6 (H) 11.5 - 14.5 % PLATELET 299 265 - 780 K/uL MPV 9.9 8.9 - 43.5 FL  
METABOLIC PANEL, COMPREHENSIVE Collection Time: 04/06/21  8:40 AM  
Result Value Ref Range Sodium 138 136 - 145 mmol/L Potassium 4.3 3.5 - 5.1 mmol/L Chloride 106 97 - 108 mmol/L  
 CO2 25 21 - 32 mmol/L Anion gap 7 5 - 15 mmol/L Glucose 150 (H) 65 - 100 mg/dL BUN 33 (H) 6 - 20 mg/dL Creatinine 0.99 0.55 - 1.02 mg/dL BUN/Creatinine ratio 33 (H) 12 - 20 GFR est AA >60 >60 ml/min/1.73m2 GFR est non-AA 55 (L) >60 ml/min/1.73m2 Calcium 8.6 8.5 - 10.1 mg/dL Bilirubin, total 0.2 0.2 - 1.0 mg/dL AST (SGOT) 48 (H) 15 - 37 U/L  
 ALT (SGPT) 28 12 - 78 U/L Alk. phosphatase 68 45 - 117 U/L Protein, total 6.1 (L) 6.4 - 8.2 g/dL Albumin 2.4 (L) 3.5 - 5.0 g/dL Globulin 3.7 2.0 - 4.0 g/dL A-G Ratio 0.6 (L) 1.1 - 2.2 LD Collection Time: 04/06/21  8:40 AM  
Result Value Ref Range  (H) 81 - 246 U/L  
C REACTIVE PROTEIN, QT Collection Time: 04/06/21  8:40 AM  
Result Value Ref Range C-Reactive protein 9.66 (H) 0.00 - 0.60 mg/dL PROCALCITONIN Collection Time: 04/06/21  8:40 AM  
Result Value Ref Range Procalcitonin <0.05 (H) 0 ng/mL GLUCOSE, POC Collection Time: 04/06/21 11:37 AM  
Result Value Ref Range Glucose (POC) 210 (H) 65 - 100 mg/dL Performed by Biju Banuelos GLUCOSE, POC Collection Time: 04/06/21  4:07 PM  
Result Value Ref Range Glucose (POC) 110 (H) 65 - 100 mg/dL Performed by Libia Alvarez   
 
 
XR CHEST PORT Final Result No significant interval change. XR CHEST PORT Final Result XR CHEST PORT    (Results Pending) CT Results  (Last 48 hours) None Assessment: 1. Acute respiratory failure with hypoxia 2. COVID-19 pneumonia 3. Shortness of breath 4.  Cough 5. Generalized weakness 6. Acute kidney injury 7. Obstructive sleep apnea on CPAP 
8. Obesity Plan:  
 
Currently on high flow nasal cannula 40 L, 70%. Will use oxygen supplementation as needed to keep saturation above 90% Patient has acute respiratory failure with hypoxia due to COVID-19 pneumonia Day 3/5 of remdesivir Day 3/10 of Decadron 6 mg IV every 8 hours, if she continues to improve we will reduce the frequency of this medication. 3/7 of azithromycin Vitamin C and zinc 
Check D-dimer for need for therapeutic anticoagulation D-dimer only mildly elevated 0.54, the patient does not require therapeutic anticoagulation.  
 
Gentle IV fluid hydration creatinine has improved from 1.07 down to 0.99 this morning. Monitor renal function with fluid changes Check electrolytes and replace as needed CPAP at night DVT and GI prophylaxis Questions of patient were answered at bedside in detail Case discussed in detail with RN, RT, and care team 
Thank you for involving me in the care of this patient I will follow with you closely during hospitalization Greater than 30 minutes were spent in direct care with this patient. Vinicius Espinosa DO 
[facility] 4/6/2021 
8:22 PM

## 2021-04-06 NOTE — PROGRESS NOTES
Infectious Disease Progress Note Subjective:  
Remains on high flow oxygen, denies new complaints, no acute events since last seen. Objective:  
Physical Exam:  
 
Visit Vitals /87 (BP Patient Position: At rest) Pulse 89 Temp 98.1 °F (36.7 °C) Resp 24 Ht 5' 4.5\" (1.638 m) Wt 218 lb (98.9 kg) SpO2 96% Breastfeeding No  
BMI 36.84 kg/m² O2 Flow Rate (L/min): 40 l/min O2 Device: Heated, Hi flow nasal cannula Temp (24hrs), Av °F (36.7 °C), Min:97.9 °F (36.6 °C), Max:98.1 °F (36.7 °C) No intake/output data recorded. No intake/output data recorded. General: NAD, AAO x 4 HEENT: MICHELLE, Moist mucosa, on high flow Lungs: CTA b/l, no wheeze/rhonchi Heart: S1S2+, RRR, no murmur Abdo: Soft, NT, ND, +BS Exts: No edema, + pulses b/l  
Skin: No wounds, No rashes or lesions Data Review:  
   
Recent Days: 
Recent Labs 21 
0840 21 
6075 WBC 10.8 5.3 HGB 10.9* 11.5 HCT 33.0* 35.1  303 Recent Labs 21 
0840 21 
7387 BUN 33* 25* CREA 0.99 1.07* Lab Results Component Value Date/Time C-Reactive protein 9.66 (H) 2021 08:40 AM  
  
 
Microbiology: None Diagnostics CXR Results  (Last 48 hours) 21 0445  XR CHEST PORT Final result Impression:  No significant interval change. Narrative:  Chest, frontal view, 2021 History: Hypoxia. Comparison: Including chest 2021. Findings: The cardiac silhouette is stable. Lung volumes remain low. Opacities  
in lungs most pronounced at the bases are not significantly changed. No  
hydrostatic edema, pleural effusion or pneumothorax is identified. The osseous  
structures are stable. Assessment/Plan 1. COVID-19 pneumonitis, diagnosed a wk PTP. Patchy infiltrates on CXR () Remains on high flow oxygen. LDH elevated at 458, DD 0.54. Ferritin is 195     On day #3 of remdesivi, decadron and azithromycin, received a dose of Tocilizumab on 4/05 We will hold off on 2nd dose of Tocilizumab since ferritin not markedly elevated F/u repeat CXR ordered for AM  
    Routine labs in the morning 2. Hypoxia due to # 1, remains on high flow O2, wean off as tolerated 3. Generalized weakness: Due to # 1, remains very weak. PT/OT eval  
  
4. IZA: Resolved, Cr at baseline 5. H/o breast and brain Ca per records Hermilo Dennis MD 
 
4/6/2021

## 2021-04-06 NOTE — PROGRESS NOTES
Pulmonary and Critical Care Progress Note Subjective:  
 
Patient seen and examined in her room's afternoon, continues to feel fairly well. Continue on high flow nasal cannula. We can get her to a regular nasal cannula soon. Currently on day 3/7 of IV azithromycin, day 3/10 of Decadron 6 mg IV every 8 hours, and day 3/5 of remdesivir. Received a dose of tocilizumab yesterday by infectious disease. Ferritin 195, LDH is come down from 458 to 380 today procalcitonin less than 0.05, CRP 9.66. We will repeat her CRP level in the morning. ABG today was 7.41/38/95 
  
 
 
Review of Systems: A comprehensive review of systems was negative except for that written in the HPI. Current Facility-Administered Medications Medication Dose Route Frequency Provider Last Rate Last Admin  dextrose (D50W) injection syrg 12.5-25 g  25-50 mL IntraVENous PRN Fransisco Torres MD      
 insulin lispro (HUMALOG) injection   SubCUTAneous AC&HS Fransisco Torres MD   3 Units at 04/06/21 1130  
 atorvastatin (LIPITOR) tablet 10 mg  10 mg Oral DAILY Ami Loera MD   10 mg at 04/06/21 0829  
 imipramine (TOFRANIL) tablet 50 mg  50 mg Oral QHS Ami Loera MD   50 mg at 04/05/21 2125  levothyroxine (SYNTHROID) tablet 88 mcg  88 mcg Oral ACB Ami Loera MD   88 mcg at 04/06/21 0830  
 lisinopril-hydroCHLOROthiazide (PRINZIDE, ZESTORETIC) 10-12.5 mg per tablet 1 Tab  1 Tab Oral DAILY Ami Loera MD   1 Tab at 04/06/21 0900  
 meloxicam (MOBIC) tablet 7.5 mg  7.5 mg Oral DAILY Ami Loera MD   7.5 mg at 04/06/21 0830  pantoprazole (PROTONIX) tablet 20 mg  20 mg Oral DAILY Ami Loera MD   20 mg at 04/06/21 5030  sertraline (ZOLOFT) tablet 100 mg  100 mg Oral DAILY Ami Loera MD   100 mg at 04/06/21 0830  cyclobenzaprine (FLEXERIL) tablet 10 mg  10 mg Oral TID Ami Loera MD   10 mg at 04/06/21 0829  
 azithromycin (ZITHROMAX) 500 mg in 0.9% sodium chloride 250 mL (VIAL-MATE)  500 mg IntraVENous Q24H Viviane Carrera MD   500 mg at 21 1724  remdesivir 100 mg in 0.9% sodium chloride 250 mL IVPB  100 mg IntraVENous Q24H Ryder VUONG MD   100 mg at 21 2300  
 glucose chewable tablet 16 g  4 Tab Oral PRN Viviane Carrera MD      
 dextrose (D50W) injection syrg 12.5-25 g  25-50 mL IntraVENous PRN Viviane Carrera MD      
 glucagon [facility]) injection 1 mg  1 mg IntraMUSCular PRN Viviane Carrera MD      
 zinc sulfate (ZINCATE) 50 mg zinc (220 mg) capsule 1 Cap  1 Cap Oral DAILY Viviane Carrera MD   1 Cap at 21 3941  enoxaparin (LOVENOX) injection 40 mg  40 mg SubCUTAneous Q24H Viviane Carrera MD   40 mg at 21 1713  acetaminophen (TYLENOL) tablet 650 mg  650 mg Oral Q6H PRN Viviane Carrera MD   650 mg at 21 1121  
 melatonin tablet 5 mg  5 mg Oral QHS PRN Viviane Carrera MD      
 ondansetron [facility]) injection 4 mg  4 mg IntraVENous Q4H PRN Viviane Carrera MD   4 mg at 21 0241  
 dexamethasone (DECADRON) 4 mg/mL injection 6 mg  6 mg IntraVENous Q8H Chelsea Fajardo MD   6 mg at 21 1316 No Known Allergies Objective:  
 
Blood pressure 128/87, pulse 89, temperature 98.1 °F (36.7 °C), resp. rate 24, height 5' 4.5\" (1.638 m), weight 98.9 kg (218 lb), SpO2 94 %, not currently breastfeeding. Temp (24hrs), Av °F (36.7 °C), Min:97.9 °F (36.6 °C), Max:98.1 °F (36.7 °C) Intake and Output: 
Current Shift: No intake/output data recorded. Last 3 Shifts: No intake/output data recorded. Physical Exam:  
 
General: Lying in bed comfortably, no acute distress, on n high flow nasal cannula. Eye: Reactive, symmetric Throat and Neck: Supple Lung: Reduced air entry bilaterally with prolonged exhalation but no wheezing. Occasional crackles. Currently on high flow nasal cannula. Heart: S1+S2. No murmurs Abdomen: soft, non-tender.  Bowel sounds normal. No masses; obese Extremities: No edema : Not done Skin: No cyanosis Neurologic: A & O x3. Grossly nonfocal 
Psychiatric: Appropriate affect; coherent Lab/Data Review: 
 
Recent Results (from the past 24 hour(s)) GLUCOSE, POC Collection Time: 04/05/21  8:03 PM  
Result Value Ref Range Glucose (POC) 171 (H) 65 - 100 mg/dL Performed by Vinicius Boo CULTURE, BLOOD, PAIRED Collection Time: 04/05/21  9:50 PM  
 Specimen: Blood Result Value Ref Range Special Requests: No Special Requests Culture result: No growth after 1 hour BLOOD GAS, ARTERIAL Collection Time: 04/06/21  6:00 AM  
Result Value Ref Range pH 7.41 7.35 - 7.45    
 PCO2 38 35 - 45 mmHg PO2 95 75 - 100 mmHg O2 SAT 97 >95 % BICARBONATE 24 22 - 26 mmol/L  
 BASE DEFICIT 0.0 0 - 2 mmol/L  
 O2 METHOD CPAP    
 FIO2 75.0 % EPAP/CPAP/PEEP 10 Sample source Arterial    
 SITE Left Radial    
 JACQUELIN'S TEST PASS    
GLUCOSE, POC Collection Time: 04/06/21  7:40 AM  
Result Value Ref Range Glucose (POC) 152 (H) 65 - 100 mg/dL [facility] W/O DIFF Collection Time: 04/06/21  8:40 AM  
Result Value Ref Range WBC 10.8 3.6 - 11.0 K/uL  
 RBC 3.84 3.80 - 5.20 M/uL  
 HGB 10.9 (L) 11.5 - 16.0 g/dL HCT 33.0 (L) 35.0 - 47.0 % MCV 85.9 80.0 - 99.0 FL  
 MCH 28.4 26.0 - 34.0 PG  
 MCHC 33.0 30.0 - 36.5 g/dL  
 RDW 16.6 (H) 11.5 - 14.5 % PLATELET 973 281 - 729 K/uL MPV 9.9 8.9 - 90.5 FL  
METABOLIC PANEL, COMPREHENSIVE Collection Time: 04/06/21  8:40 AM  
Result Value Ref Range Sodium 138 136 - 145 mmol/L Potassium 4.3 3.5 - 5.1 mmol/L Chloride 106 97 - 108 mmol/L  
 CO2 25 21 - 32 mmol/L Anion gap 7 5 - 15 mmol/L Glucose 150 (H) 65 - 100 mg/dL BUN 33 (H) 6 - 20 mg/dL Creatinine 0.99 0.55 - 1.02 mg/dL BUN/Creatinine ratio 33 (H) 12 - 20 GFR est AA >60 >60 ml/min/1.73m2 GFR est non-AA 55 (L) >60 ml/min/1.73m2 Calcium 8.6 8.5 - 10.1 mg/dL Bilirubin, total 0.2 0.2 - 1.0 mg/dL AST (SGOT) 48 (H) 15 - 37 U/L  
 ALT (SGPT) 28 12 - 78 U/L Alk. phosphatase 68 45 - 117 U/L Protein, total 6.1 (L) 6.4 - 8.2 g/dL Albumin 2.4 (L) 3.5 - 5.0 g/dL Globulin 3.7 2.0 - 4.0 g/dL A-G Ratio 0.6 (L) 1.1 - 2.2 LD Collection Time: 04/06/21  8:40 AM  
Result Value Ref Range  (H) 81 - 246 U/L  
C REACTIVE PROTEIN, QT Collection Time: 04/06/21  8:40 AM  
Result Value Ref Range C-Reactive protein 9.66 (H) 0.00 - 0.60 mg/dL PROCALCITONIN Collection Time: 04/06/21  8:40 AM  
Result Value Ref Range Procalcitonin <0.05 (H) 0 ng/mL GLUCOSE, POC Collection Time: 04/06/21 11:37 AM  
Result Value Ref Range Glucose (POC) 210 (H) 65 - 100 mg/dL Performed by Saul Dickson GLUCOSE, POC Collection Time: 04/06/21  4:07 PM  
Result Value Ref Range Glucose (POC) 110 (H) 65 - 100 mg/dL Performed by Jose Lowe   
 
 
XR CHEST PORT Final Result No significant interval change. XR CHEST PORT Final Result XR CHEST PORT    (Results Pending) CT Results  (Last 48 hours) None Assessment: 1. Acute respiratory failure with hypoxia 2. COVID-19 pneumonia 3. Shortness of breath 4.  Cough 5. Generalized weakness 6. Acute kidney injury 7. Obstructive sleep apnea on CPAP 
8. Obesity Plan:  
 
Currently on high flow nasal cannula 40 L, 70%. Will use oxygen supplementation as needed to keep saturation above 90% Patient has acute respiratory failure with hypoxia due to COVID-19 pneumonia Day 3/5 of remdesivir Day 3/10 of Decadron 6 mg IV every 8 hours, if she continues to improve we will reduce the frequency of this medication. 3/7 of azithromycin Status post tocilizumab on 4/5/2021 Vitamin C and zinc 
Check D-dimer for need for therapeutic anticoagulation D-dimer only mildly elevated 0.54, the patient does not require therapeutic anticoagulation. Gentle IV fluid hydration creatinine has improved from 1.07 down to 0.99 this morning. Monitor renal function with fluid changes Check electrolytes and replace as needed CPAP at night DVT and GI prophylaxis Questions of patient were answered at bedside in detail Case discussed in detail with RN, RT, and care team 
Thank you for involving me in the care of this patient I will follow with you closely during hospitalization Greater than 30 minutes were spent in direct care with this patient. Jacob Ilan, DO 
[facility] 4/6/2021 
8:22 PM

## 2021-04-06 NOTE — PROGRESS NOTES
Problem: Risk for Spread of Infection Goal: Prevent transmission of infectious organism to others Description: Prevent the transmission of infectious organisms to other patients, staff members, and visitors. Outcome: Progressing Towards Goal 
  
Problem: Patient Education:  Go to Education Activity Goal: Patient/Family Education Outcome: Progressing Towards Goal 
  
Problem: Falls - Risk of 
Goal: *Absence of Falls Description: Document [place] Fall Risk and appropriate interventions in the flowsheet. Outcome: Progressing Towards Goal 
Note: Fall Risk Interventions: 
Mobility Interventions: OT consult for ADLs, PT Consult for mobility concerns Mentation Interventions: Bed/chair exit alarm Elimination Interventions: Bed/chair exit alarm, Call light in reach History of Falls Interventions: Bed/chair exit alarm, Room close to nurse's station Problem: Patient Education: Go to Patient Education Activity Goal: Patient/Family Education Outcome: Progressing Towards Goal 
  
Problem: Pressure Injury - Risk of 
Goal: *Prevention of pressure injury Description: Document Arsh Scale and appropriate interventions in the flowsheet. Outcome: Progressing Towards Goal 
Note: Pressure Injury Interventions: 
  
 
Moisture Interventions: Minimize layers, Absorbent underpads, Limit adult briefs, Maintain skin hydration (lotion/cream) Activity Interventions: PT/OT evaluation, Increase time out of bed Mobility Interventions: HOB 30 degrees or less, PT/OT evaluation Nutrition Interventions: Document food/fluid/supplement intake Friction and Shear Interventions: Lift sheet, HOB 30 degrees or less Problem: Patient Education: Go to Patient Education Activity Goal: Patient/Family Education Outcome: Progressing Towards Goal

## 2021-04-07 NOTE — PROGRESS NOTES
Infectious Disease Progress Note Subjective:  
Pt seen and examined at bedside. Remains on high flow O2. Remains afebrile, reports exertional dyspnea Objective:  
Physical Exam:  
 
Visit Vitals /76 Pulse 83 Temp 97.9 °F (36.6 °C) Resp 18 Ht 5' 4.5\" (1.638 m) Wt 218 lb (98.9 kg) SpO2 94% Breastfeeding No  
BMI 36.84 kg/m² O2 Flow Rate (L/min): 40 l/min O2 Device: Hi flow nasal cannula Temp (24hrs), Av °F (36.7 °C), Min:97.9 °F (36.6 °C), Max:98.1 °F (36.7 °C) No intake/output data recorded. No intake/output data recorded. General: NAD, AAO x 4 HEENT: MICHELLE, Moist mucosa, on high flow Lungs: CTA b/l, no wheeze/rhonchi Heart: S1S2+, RRR, no murmur Abdo: Soft, NT, ND, +BS Exts: No edema, + pulses b/l  
Skin: No wounds, No rashes or lesions Data Review:  
   
Recent Days: 
Recent Labs 21 
0840 21 
8180 WBC 10.8 5.3 HGB 10.9* 11.5 HCT 33.0* 35.1  303 Recent Labs 21 
0630 21 
0840 21 
0796 BUN 35* 33* 25* CREA 0.98 0.99 1.07* Lab Results Component Value Date/Time C-Reactive protein 5.09 (H) 2021 06:30 AM  
  
 
Microbiology: None Diagnostics CXR Results  (Last 48 hours) 21 0828  XR CHEST PORT Final result Narrative:  Chest single view. Comparison single view chest 2021 Similar minor hazy reticular markings throughout the lungs. Findings would fit  
with clinical concern for infectious/inflammatory process including viral  
etiologies. Cardiac and mediastinal structures unchanged. No pneumothorax or  
sizable pleural effusion. Assessment/Plan 1. COVID-19 pneumonitis, remains on high flow at 100% FIO2 Remains on high flow oxygen. LDH elevated at 458, DD 0.54, Ferritin is 195 On day #4 of remdesivi, decadron and azithromycin, S/p 1 dose of Tocilizumab     Afebrile, mild rise in WBC on todays labs Continue to wean off O2 as tolerated 2. Hypoxia due to # 1, remains on high flow O2, wean off as tolerated 3. Generalized weakness, appetite is improving: Being followed by PT/OT  
  
4. IZA: Resolved, Cr at baseline 5. H/o breast and brain Ca per records Elizabeth Booker MD 
 
4/7/2021

## 2021-04-07 NOTE — PROGRESS NOTES
Returned call to daughter, Tyree Miller, who has her contact number to give her an update on patient's condition.

## 2021-04-07 NOTE — PROGRESS NOTES
Problem: Risk for Spread of Infection Goal: Prevent transmission of infectious organism to others Description: Prevent the transmission of infectious organisms to other patients, staff members, and visitors. Outcome: Progressing Towards Goal 
  
Problem: Patient Education:  Go to Education Activity Goal: Patient/Family Education Outcome: Progressing Towards Goal 
  
Problem: Falls - Risk of 
Goal: *Absence of Falls Description: Document Jeanine Anguiano Fall Risk and appropriate interventions in the flowsheet. Outcome: Progressing Towards Goal 
Note: Fall Risk Interventions: 
Mobility Interventions: OT consult for ADLs, PT Consult for mobility concerns, PT Consult for assist device competence Mentation Interventions: Bed/chair exit alarm, More frequent rounding, Reorient patient, Room close to nurse's station, Toileting rounds Elimination Interventions: Bed/chair exit alarm, Call light in reach, Patient to call for help with toileting needs History of Falls Interventions: Bed/chair exit alarm, Room close to nurse's station Problem: Patient Education: Go to Patient Education Activity Goal: Patient/Family Education Outcome: Progressing Towards Goal 
  
Problem: Pressure Injury - Risk of 
Goal: *Prevention of pressure injury Description: Document Arsh Scale and appropriate interventions in the flowsheet. Outcome: Progressing Towards Goal 
Note: Pressure Injury Interventions: 
  
 
Moisture Interventions: Minimize layers, Maintain skin hydration (lotion/cream) Activity Interventions: PT/OT evaluation Mobility Interventions: HOB 30 degrees or less Nutrition Interventions: Document food/fluid/supplement intake Friction and Shear Interventions: Lift sheet, HOB 30 degrees or less Problem: Patient Education: Go to Patient Education Activity Goal: Patient/Family Education Outcome: Progressing Towards Goal

## 2021-04-07 NOTE — PROGRESS NOTES
Bedside and Verbal shift change report given to Will Das RN (oncoming nurse) by Jeevan Mckenna RN (offgoing nurse). Report included the following information SBAR, Intake/Output, Recent Results, Med Rec Status and Cardiac Rhythm NSR.

## 2021-04-07 NOTE — PROGRESS NOTES
Pulmonary and Critical Care Progress Note Subjective:  
 
Patient seen and examined in her room's afternoon, feeling a little worse today with some nausea. Continuing on high flow nasal cannula 40L, 80% but saturating very well. We will reduce her to 70% FiO2. Currently on day 4/7 of IV azithromycin, day 4/10 of Decadron 6 mg IV every 8 hours, and day 4/5 of remdesivir. Received a dose of tocilizumab on 4/5/20 by infectious disease. CRP decreasing this morning. We will plan to repeat an ABG in the morning. Chest x-ray personally reviewed and is overall fairly unremarkable showing similar mild hazy reticular findings bilaterally. 
  
 
 
Review of Systems: A comprehensive review of systems was negative except for that written in the HPI. Current Facility-Administered Medications Medication Dose Route Frequency Provider Last Rate Last Admin  insulin lispro (HUMALOG) injection 3 Units  3 Units SubCUTAneous Winnie Lorenzana MD      
 dextrose (D50W) injection syrg 12.5-25 g  25-50 mL IntraVENous Winnie Elder MD      
 insulin lispro (HUMALOG) injection   SubCUTAneous AC&HS Yusuf Cade MD   3 Units at 04/07/21 1216  
 atorvastatin (LIPITOR) tablet 10 mg  10 mg Oral DAILY Roberto Martinez MD   10 mg at 04/07/21 9485  imipramine (TOFRANIL) tablet 50 mg  50 mg Oral QHS Roberto Martinez MD   50 mg at 04/06/21 2306  levothyroxine (SYNTHROID) tablet 88 mcg  88 mcg Oral ACB Roberto Martinez MD   88 mcg at 04/07/21 8341  lisinopril-hydroCHLOROthiazide (PRINZIDE, ZESTORETIC) 10-12.5 mg per tablet 1 Tab  1 Tab Oral DAILY Roberto Martinez MD   1 Tab at 04/07/21 0900  
 meloxicam (MOBIC) tablet 7.5 mg  7.5 mg Oral DAILY Roberto Martinez MD   7.5 mg at 04/07/21 9562  pantoprazole (PROTONIX) tablet 20 mg  20 mg Oral DAILY Roberto Martinez MD   20 mg at 04/07/21 2386  sertraline (ZOLOFT) tablet 100 mg  100 mg Oral DAILY Tamra Nelson MD   100 mg at 21 7789  cyclobenzaprine (FLEXERIL) tablet 10 mg  10 mg Oral TID Tamra Nelson MD   10 mg at 21 0952  
 azithromycin (ZITHROMAX) 500 mg in 0.9% sodium chloride 250 mL (VIAL-MATE)  500 mg IntraVENous Q24H Tamra Nelson MD   500 mg at 21 1649  remdesivir 100 mg in 0.9% sodium chloride 250 mL IVPB  100 mg IntraVENous Q24H Tamra Nelson MD   100 mg at 21 2309  
 glucose chewable tablet 16 g  4 Tab Oral PRN Tamra Nelson MD      
 dextrose (D50W) injection syrg 12.5-25 g  25-50 mL IntraVENous PRN Tamra Nelson MD      
 glucagon [facility]) injection 1 mg  1 mg IntraMUSCular PRN Tamra Nelson MD      
 zinc sulfate (ZINCATE) 50 mg zinc (220 mg) capsule 1 Cap  1 Cap Oral DAILY Tamra Nelson MD   1 Cap at 21 3170  enoxaparin (LOVENOX) injection 40 mg  40 mg SubCUTAneous Q24H Tamra Nelson MD   40 mg at 21 1650  acetaminophen (TYLENOL) tablet 650 mg  650 mg Oral Q6H PRN Tamra Nelson MD   650 mg at 21 1411  
 melatonin tablet 5 mg  5 mg Oral QHS PRN Tamra Nelson MD      
 ondansetron [facility]) injection 4 mg  4 mg IntraVENous Q4H PRN Tamra Nelson MD   4 mg at 21 0241  
 dexamethasone (DECADRON) 4 mg/mL injection 6 mg  6 mg IntraVENous Q8H Chelsea Fajardo MD   6 mg at 21 1410 No Known Allergies Objective:  
 
Blood pressure 132/76, pulse 83, temperature 97.9 °F (36.6 °C), resp. rate 18, height 5' 4.5\" (1.638 m), weight 98.9 kg (218 lb), SpO2 92 %, not currently breastfeeding. Temp (24hrs), Av °F (36.7 °C), Min:97.9 °F (36.6 °C), Max:98.1 °F (36.7 °C) Intake and Output: 
Current Shift: 701 -  1900 In: 240 [P.O.:240] Out: 250 [Urine:250] Last 3 Shifts: No intake/output data recorded. Physical Exam:  
 
General: Lying in bed comfortably, no acute distress, on n high flow nasal cannula. Eye: Reactive, symmetric Throat and Neck: Supple Lung: Reduced air entry bilaterally with prolonged exhalation but no wheezing. Occasional crackles. Currently on high flow nasal cannula. Heart: S1+S2. No murmurs Abdomen: soft, non-tender. Bowel sounds normal. No masses; obese Extremities: No edema : Not done Skin: No cyanosis Neurologic: A & O x3. Grossly nonfocal 
Psychiatric: Appropriate affect; coherent Lab/Data Review: 
 
Recent Results (from the past 24 hour(s)) GLUCOSE, POC Collection Time: 04/06/21  4:07 PM  
Result Value Ref Range Glucose (POC) 110 (H) 65 - 100 mg/dL Performed by Lisa Abdul GLUCOSE, POC Collection Time: 04/06/21  8:07 PM  
Result Value Ref Range Glucose (POC) 206 (H) 65 - 100 mg/dL Performed by Lisa Abdul MAGNESIUM Collection Time: 04/07/21  6:30 AM  
Result Value Ref Range Magnesium 2.1 1.6 - 2.4 mg/dL BNP Collection Time: 04/07/21  6:30 AM  
Result Value Ref Range NT pro- (H) <125 pg/mL HEPATIC FUNCTION PANEL Collection Time: 04/07/21  6:30 AM  
Result Value Ref Range Protein, total 5.6 (L) 6.4 - 8.2 g/dL Albumin 2.3 (L) 3.5 - 5.0 g/dL Globulin 3.3 2.0 - 4.0 g/dL A-G Ratio 0.7 (L) 1.1 - 2.2 Bilirubin, total 0.2 0.2 - 1.0 mg/dL Bilirubin, direct 0.1 0.0 - 0.2 mg/dL Alk. phosphatase 68 45 - 117 U/L  
 AST (SGOT) 45 (H) 15 - 37 U/L  
 ALT (SGPT) 27 12 - 78 U/L  
C REACTIVE PROTEIN, QT Collection Time: 04/07/21  6:30 AM  
Result Value Ref Range C-Reactive protein 5.09 (H) 0.00 - 0.60 mg/dL CBC WITH AUTOMATED DIFF Collection Time: 04/07/21  6:30 AM  
Result Value Ref Range WBC 11.6 (H) 3.6 - 11.0 K/uL  
 RBC 3.73 (L) 3.80 - 5.20 M/uL  
 HGB 10.5 (L) 11.5 - 16.0 g/dL HCT 32.1 (L) 35.0 - 47.0 % MCV 86.1 80.0 - 99.0 FL  
 MCH 28.2 26.0 - 34.0 PG  
 MCHC 32.7 30.0 - 36.5 g/dL  
 RDW 16.7 (H) 11.5 - 14.5 % PLATELET 272 449 - 889 K/uL MPV 9.5 8.9 - 12.9 FL  
 NEUTROPHILS 89 (H) 32 - 75 %  LYMPHOCYTES 5 (L) 12 - 49 % MONOCYTES 5 5 - 13 % EOSINOPHILS 0 0 - 7 % BASOPHILS 0 0 - 1 % IMMATURE GRANULOCYTES 1 (H) 0.0 - 0.5 % ABS. NEUTROPHILS 10.5 (H) 1.8 - 8.0 K/UL  
 ABS. LYMPHOCYTES 0.6 (L) 0.8 - 3.5 K/UL  
 ABS. MONOCYTES 0.6 0.0 - 1.0 K/UL  
 ABS. EOSINOPHILS 0.0 0.0 - 0.4 K/UL  
 ABS. BASOPHILS 0.0 0.0 - 0.1 K/UL  
 ABS. IMM. GRANS. 0.1 (H) 0.00 - 0.04 K/UL  
 DF AUTOMATED RENAL FUNCTION PANEL Collection Time: 04/07/21  6:30 AM  
Result Value Ref Range Sodium 137 136 - 145 mmol/L Potassium 4.3 3.5 - 5.1 mmol/L Chloride 107 97 - 108 mmol/L  
 CO2 24 21 - 32 mmol/L Anion gap 6 5 - 15 mmol/L Glucose 120 (H) 65 - 100 mg/dL BUN 35 (H) 6 - 20 mg/dL Creatinine 0.98 0.55 - 1.02 mg/dL BUN/Creatinine ratio 36 (H) 12 - 20 GFR est AA >60 >60 ml/min/1.73m2 GFR est non-AA 55 (L) >60 ml/min/1.73m2 Calcium 8.7 8.5 - 10.1 mg/dL Phosphorus 3.2 2.6 - 4.7 mg/dL Albumin 2.4 (L) 3.5 - 5.0 g/dL GLUCOSE, POC Collection Time: 04/07/21  8:48 AM  
Result Value Ref Range Glucose (POC) 132 (H) 65 - 100 mg/dL Performed by Tony Nguyễn   
GLUCOSE, POC Collection Time: 04/07/21 11:07 AM  
Result Value Ref Range Glucose (POC) 222 (H) 65 - 100 mg/dL Performed by Mónica PLAZA   
 
 
XR CHEST PORT Final Result XR CHEST PORT Final Result No significant interval change. XR CHEST PORT Final Result CT Results  (Last 48 hours) None Assessment: 1. Acute respiratory failure with hypoxia 2. COVID-19 pneumonia 3. Shortness of breath 4.  Cough 5. Generalized weakness 6. Acute kidney injury 7. Obstructive sleep apnea on CPAP 
8. Obesity Plan:  
 
Currently on high flow nasal cannula 40 L, 80%. Will use oxygen supplementation as needed to keep saturation above 90% Patient has acute respiratory failure with hypoxia due to COVID-19 pneumonia Day 4/5 of remdesivir Day 4/10 of Decadron 6 mg IV every 8 hours, if she improves we will reduce the frequency of this medication. 4/7 of azithromycin Status post tocilizumab on 4/5/2021 Vitamin C and zinc 
Check D-dimer for need for therapeutic anticoagulation D-dimer only mildly elevated 0.54, the patient does not require therapeutic anticoagulation. CRP decreasing this morning Gentle IV fluid hydration, creatinine stable today. Monitor renal function with fluid changes Check electrolytes and replace as needed CPAP at night DVT and GI prophylaxis Questions of patient were answered at bedside in detail Case discussed in detail with RN, RT, and care team 
Thank you for involving me in the care of this patient I will follow with you closely during hospitalization Greater than 30 minutes were spent in direct care with this patient. Victorino Barron DO 
[facility] 4/7/2021 
8:22 PM

## 2021-04-07 NOTE — PROGRESS NOTES
Hospitalist Progress Note NAME: Max Fraction :  1945 MRN:  [id number] Subjective: Chief Complaint / Reason for Physician Visit Patient seen and evaluated at bedside, of note patient states her shortness of breath has improved slightly, currently appears comfortable on HFNC. Discussed with RN events overnight. Review of Systems: 
Symptom Y/N Comments  Symptom Y/N Comments Fever/Chills N   Chest Pain N Poor Appetite Y   Edema N   
Cough Y   Abdominal Pain N Sputum Y   Joint Pain N   
SOB/MADERA Y   Pruritis/Rash N   
Nausea/vomit N   Tolerating PT/OT NA Diarrhea N   Tolerating Diet Y Constipation N   Other Could NOT obtain due to:   
Patient denies any fevers chills nausea vomiting lightheadedness dizziness chest pain palpitations headache focal weakness loss sensation auditory or visual symptoms abdominal stool or urinary complaints or any other associated symptoms Objective: VITALS:  
Last 24hrs VS reviewed since prior progress note. Most recent are: 
Patient Vitals for the past 24 hrs: 
 Temp Pulse Resp BP SpO2  
21 1222     92 % 21 0942     94 % 21 0900  83  132/76   
21 0755 97.9 °F (36.6 °C) 76 18 133/79 96 % 21 0714     94 % 21 0000     95 % 21 1651     96 % 21 1454 98.1 °F (36.7 °C) 89 24 128/87 94 % No intake or output data in the 24 hours ending 21 1302 PHYSICAL EXAM: 
General: Patient appears uncomfortable EENT:  EOMI. Anicteric sclerae. MMM Resp:  Decreased air entry bilaterally with appreciable bilateral bibasilar crackle CV:  Regular  rhythm, S1 plus S2, no murmurs rubs or gallops  No edema GI:  Soft, Non distended, Non tender. +Bowel sounds Neurologic:  Alert and oriented X 3, normal speech, Psych:   Good insight. Not anxious nor agitated Skin:  No rashes. No jaundice Procedures: see electronic medical records for all procedures/Xrays and details which were not copied into this note but were reviewed prior to creation of Plan. LABS: 
I reviewed today's most current labs and imaging studies. Pertinent labs include: 
Recent Labs 04/05/21 
3467 WBC 5.3 HGB 11.5 HCT 35.1  Recent Labs 04/05/21 
9898   
K 5.4*  
 CO2 27 * BUN 25* CREA 1.07* CA 8.3* ALB 2.4* TBILI 0.2 ALT 25 Signed: Brian Davila MD 
 
X-ray chest:The cardiac silhouette is stable. Lung volumes remain low. Opacities 
in lungs most pronounced at the bases are not significantly changed. No 
hydrostatic edema, pleural effusion or pneumothorax is identified. The osseous 
structures are stable. Reviewed most current lab test results and cultures  YES Reviewed most current radiology test results   YES Review and summation of old records today    NO Reviewed patient's current orders and MAR    YES 
PMH/SH reviewed - no change compared to H&P Assessment / Plan: 
Acute respiratory failure with hypoxia secondary to COVID-19 pneumonia-patient presented with acute respiratory failure with hypoxia requiring high flow nasal cannula for ventilatory support and based on patient's clinical presentation secondary to COVID-19 pneumonia, patient does not meet sepsis criteria at this time Follow up blood cultures Follow up sputum cultures Continue Remdesivir day 4/5 Continue Decadron 6 mg IV every 8 Continue azithromycin once daily Continue to monitor respiratory status Pulmonology consult appreciated, continue to follow recommendations Infectious disease consult appreciated, continue to follow recommendations Hypertensioncontinue home antihypertensive medications Hypothyroidismcontinue Synthroid Gastroesophageal reflux diseasecontinue Protonix once daily Hyperglycemia due to type 2 diabetes - likely iatrogenic Start lispro tid according to 24 hour coverage Continue insulin sliding scale Depression/anxietycontinue home Zoloft ProphylaxisLovenox FENcardiac diet, replete potassium and magnesium Full code, OREN is daughter Ghada Loza [phone] Dispositionpending clinical improvement/Pt/OT evaluation 25.0 - 29.9 Overweight / Body mass index is 36.84 kg/m². Code status: Full Prophylaxis: Lovenox Recommended Disposition:  PT, OT, RN  
 
________________________________________________________________________ Care Plan discussed with: 
  Comments Patient X Family RN X   
Care Manager X Consultant  X   
                 X Multidiciplinary team rounds were held today with , nursing, pharmacist and clinical coordinator. Patient's plan of care was discussed; medications were reviewed and discharge planning was addressed. ________________________________________________________________________ Total NON critical care TIME:  35   Minutes Comments >50% of visit spent in counseling and coordination of care X   
________________________________________________________________________ Gail Connor MD

## 2021-04-07 NOTE — PROGRESS NOTES
Skin assessment: patient wearing tele box 91. IV saline locked in right AC. Generalized bruising noted. Redness and excoriation noted to patients gluteal cleft and sandra area

## 2021-04-08 NOTE — PROGRESS NOTES
Pulmonary and Critical Care Progress Note Subjective:  
 
Patient seen and examined in her room's afternoon, feeling a little worse today with some nausea. Continuing on high flow nasal cannula 40L, 80%. Wean for sats greater than 90%. Currently on day 5/7 of IV azithromycin, day 5/10 of Decadron 6 mg IV every 8 hours, and day 5/5 of remdesivir. Received a dose of tocilizumab on 4/5/20 by infectious disease. CRP decreased yesterday morning. We will repeat inflammatory markers in the morning to see if she would qualify for second dose of tocilizumab. ABG today 7.4 1/40/77 Continue on nightly CPAP. 
  
 
 
Review of Systems: A comprehensive review of systems was negative except for that written in the HPI. Current Facility-Administered Medications Medication Dose Route Frequency Provider Last Rate Last Admin  insulin lispro (HUMALOG) injection 3 Units  3 Units SubCUTAneous Sabas Ferraro MD   3 Units at 04/08/21 0916  dextrose (D50W) injection syrg 12.5-25 g  25-50 mL IntraVENous PRN Francine Vera MD      
 insulin lispro (HUMALOG) injection   SubCUTAneous AC&HS Francine Vera MD   2 Units at 04/08/21 0841  
 atorvastatin (LIPITOR) tablet 10 mg  10 mg Oral DAILY Julio Hess MD   10 mg at 04/08/21 0842  
 imipramine (TOFRANIL) tablet 50 mg  50 mg Oral QHS Julio Hess MD   50 mg at 04/07/21 2142  levothyroxine (SYNTHROID) tablet 88 mcg  88 mcg Oral ACB Julio Hess MD   88 mcg at 04/08/21 3479  lisinopril-hydroCHLOROthiazide (PRINZIDE, ZESTORETIC) 10-12.5 mg per tablet 1 Tab  1 Tab Oral DAILY Julio Hess MD   1 Tab at 04/07/21 0900  
 meloxicam (MOBIC) tablet 7.5 mg  7.5 mg Oral DAILY Julio Hess MD   7.5 mg at 04/08/21 2147  pantoprazole (PROTONIX) tablet 20 mg  20 mg Oral DAILY Julio Hess MD   20 mg at 04/08/21 0458  sertraline (ZOLOFT) tablet 100 mg  100 mg Oral DAILY Cole Litten MD CAROLANN   100 mg at 21 1416  cyclobenzaprine (FLEXERIL) tablet 10 mg  10 mg Oral TID Tano Almanza MD   10 mg at 21 0842  
 azithromycin (ZITHROMAX) 500 mg in 0.9% sodium chloride 250 mL (VIAL-MATE)  500 mg IntraVENous Q24H Tano Almanza MD   500 mg at 21 1744  remdesivir 100 mg in 0.9% sodium chloride 250 mL IVPB  100 mg IntraVENous Q24H Tano Almanza MD   100 mg at 21 2145  
 glucose chewable tablet 16 g  4 Tab Oral PRN Tano Almanza MD      
 dextrose (D50W) injection syrg 12.5-25 g  25-50 mL IntraVENous PRN Tano Almanza MD      
 glucagon [facility]) injection 1 mg  1 mg IntraMUSCular PRN Tano Almanza MD      
 zinc sulfate (ZINCATE) 50 mg zinc (220 mg) capsule 1 Cap  1 Cap Oral DAILY Tano Almanza MD   1 Cap at 21 5862  enoxaparin (LOVENOX) injection 40 mg  40 mg SubCUTAneous Q24H Tano Almanza MD   40 mg at 21 1744  acetaminophen (TYLENOL) tablet 650 mg  650 mg Oral Q6H PRN Tano Almanza MD   650 mg at 21 1411  
 melatonin tablet 5 mg  5 mg Oral QHS PRN Tano Almanza MD      
 ondansetron [facility]) injection 4 mg  4 mg IntraVENous Q4H PRN Tano Almanza MD   4 mg at 21 0241  
 dexamethasone (DECADRON) 4 mg/mL injection 6 mg  6 mg IntraVENous Q8H Pacheco Fuentes MD   6 mg at 21 8354 No Known Allergies Objective:  
 
Blood pressure 126/77, pulse 81, temperature 97.7 °F (36.5 °C), resp. rate 24, height 5' 4.5\" (1.638 m), weight 98.9 kg (218 lb), SpO2 90 %, not currently breastfeeding. Temp (24hrs), Av.1 °F (36.7 °C), Min:97.7 °F (36.5 °C), Max:98.5 °F (36.9 °C) Intake and Output: 
Current Shift: 701 - 1900 In: -  
Out: 176 [UQYEO:425] Last 3 Shifts: 1901 - 700 In: 240 [P.O.:240] Out: 900 [Urine:900] Physical Exam:  
 
General: Lying in bed comfortably, no acute distress, on n high flow nasal cannula.  
Eye: Reactive, symmetric Throat and Neck: Supple Lung: Reduced air entry bilaterally with prolonged exhalation but no wheezing. Occasional crackles. Currently on high flow nasal cannula. Heart: S1+S2. No murmurs Abdomen: soft, non-tender. Bowel sounds normal. No masses; obese Extremities: No edema : Not done Skin: No cyanosis Neurologic: A & O x3. Grossly nonfocal 
Psychiatric: Appropriate affect; coherent Lab/Data Review: 
 
Recent Results (from the past 24 hour(s)) GLUCOSE, POC Collection Time: 04/07/21 11:07 AM  
Result Value Ref Range Glucose (POC) 222 (H) 65 - 100 mg/dL Performed by Ysabel Mari   
GLUCOSE, POC Collection Time: 04/07/21  4:14 PM  
Result Value Ref Range Glucose (POC) 96 65 - 100 mg/dL Performed by Taiwo Vital, POC Collection Time: 04/07/21  9:21 PM  
Result Value Ref Range Glucose (POC) 116 (H) 65 - 100 mg/dL Performed by Radha Carbajal   
BLOOD GAS, ARTERIAL Collection Time: 04/08/21  4:00 AM  
Result Value Ref Range pH 7.41 7.35 - 7.45    
 PCO2 40 35 - 45 mmHg PO2 77 75 - 100 mmHg O2 SAT 96 >95 % BICARBONATE 25 22 - 26 mmol/L  
 BASE EXCESS 1.1 0 - 2 mmol/L  
 O2 METHOD CPAP    
 FIO2 80 % EPAP/CPAP/PEEP 10 Sample source Arterial    
 SITE Right Brachial    
 JACQUELIN'S TEST Positive GLUCOSE, POC Collection Time: 04/08/21  7:40 AM  
Result Value Ref Range Glucose (POC) 154 (H) 65 - 100 mg/dL Performed by [facility] XR CHEST PORT Final Result XR CHEST PORT Final Result No significant interval change. XR CHEST PORT Final Result CT Results  (Last 48 hours) None Assessment: 1. Acute respiratory failure with hypoxia 2. COVID-19 pneumonia 3. Shortness of breath 4.  Cough 5. Generalized weakness 6. Acute kidney injury 7. Obstructive sleep apnea on CPAP 
8. Obesity Plan:  
 
Currently on high flow nasal cannula 40 L, 80%.  
Will use oxygen supplementation as needed to keep saturation above 90% Patient has acute respiratory failure with hypoxia due to COVID-19 pneumonia Day 4/5 of remdesivir Day 4/10 of Decadron 6 mg IV every 8 hours, if she improves we will reduce the frequency of this medication. 4/7 of azithromycin Status post tocilizumab on 4/5/2021 Vitamin C and zinc 
D-dimer only mildly elevated 0.54, the patient does not require therapeutic anticoagulation. CRP decreasing yesterday morning, repeat inflammatory markers in the morning. Possibly could qualify for tocilizumab dose #2 tomorrow. Chest x-ray overall fairly unremarkable for degree of hypoxemia. We will check a TTE with bubble study. Gentle IV fluid hydration, creatinine stable today. Monitor renal function with fluid changes Check electrolytes and replace as needed CPAP at night DVT and GI prophylaxis Questions of patient were answered at bedside in detail Case discussed in detail with RN, RT, and care team 
Thank you for involving me in the care of this patient I will follow with you closely during hospitalization Greater than 30 minutes were spent in direct care with this patient. Ramez Pérez DO 
[facility] 4/8/2021 
8:22 PM

## 2021-04-08 NOTE — PROGRESS NOTES
Problem: Risk for Spread of Infection Goal: Prevent transmission of infectious organism to others Description: Prevent the transmission of infectious organisms to other patients, staff members, and visitors. Outcome: Progressing Towards Goal 
  
Problem: Falls - Risk of 
Goal: *Absence of Falls Description: Document Kassidy Jerome Fall Risk and appropriate interventions in the flowsheet. Outcome: Progressing Towards Goal 
Note: Fall Risk Interventions: 
Mobility Interventions: Strengthening exercises (ROM-active/passive) Mentation Interventions: Evaluate medications/consider consulting pharmacy Elimination Interventions: Bed/chair exit alarm History of Falls Interventions: Bed/chair exit alarm Problem: Pressure Injury - Risk of 
Goal: *Prevention of pressure injury Description: Document Arsh Scale and appropriate interventions in the flowsheet. Outcome: Progressing Towards Goal 
Note: Pressure Injury Interventions: 
Sensory Interventions: Assess changes in LOC Moisture Interventions: Absorbent underpads Activity Interventions: PT/OT evaluation Mobility Interventions: HOB 30 degrees or less Nutrition Interventions: Document food/fluid/supplement intake Friction and Shear Interventions: Apply protective barrier, creams and emollients

## 2021-04-08 NOTE — PROGRESS NOTES
Received report on patient. Patient is currently sleeping. bipap on and working. Bed in low position. Call light within reach.

## 2021-04-08 NOTE — PROGRESS NOTES
Hospitalist Progress Note NAME: Joana Dwyer :  1945 MRN:  [id number] Subjective: Chief Complaint / Reason for Physician Visit Patient seen and evaluated at bedside, of note patient states her shortness of breath has improved slightly, currently appears comfortable on HFNC. Discussed with RN events overnight. Review of Systems: 
Symptom Y/N Comments  Symptom Y/N Comments Fever/Chills N   Chest Pain N Poor Appetite Y   Edema N   
Cough Y   Abdominal Pain N Sputum Y   Joint Pain N   
SOB/MADERA Y   Pruritis/Rash N   
Nausea/vomit N   Tolerating PT/OT NA Diarrhea N   Tolerating Diet Y Constipation N   Other Could NOT obtain due to:   
Patient denies any fevers chills nausea vomiting lightheadedness dizziness chest pain palpitations headache focal weakness loss sensation auditory or visual symptoms abdominal stool or urinary complaints or any other associated symptoms Objective: VITALS:  
Last 24hrs VS reviewed since prior progress note. Most recent are: 
Patient Vitals for the past 24 hrs: 
 Temp Pulse Resp BP SpO2  
21 1103     90 % 21 0737 97.7 °F (36.5 °C) 81 24 126/77 90 % 21 2337     94 % 21 2214     94 % 21 1955 98.2 °F (36.8 °C) 90 18 (!) 99/53 93 % 21 1536 98.5 °F (36.9 °C) 82 20 126/72 94 % Intake/Output Summary (Last 24 hours) at 2021 1247 Last data filed at 2021 1000 Gross per 24 hour Intake 358 ml Output 1150 ml Net -792 ml PHYSICAL EXAM: 
General: Patient appears uncomfortable EENT:  EOMI. Anicteric sclerae. MMM Resp:  Decreased air entry bilaterally with appreciable bilateral bibasilar crackle CV:  Regular  rhythm, S1 plus S2, no murmurs rubs or gallops  No edema GI:  Soft, Non distended, Non tender. +Bowel sounds Neurologic:  Alert and oriented X 3, normal speech, Psych:   Good insight. Not anxious nor agitated Skin:  No rashes. No jaundice Procedures: see electronic medical records for all procedures/Xrays and details which were not copied into this note but were reviewed prior to creation of Plan. LABS: 
I reviewed today's most current labs and imaging studies. Pertinent labs include: 
Recent Labs 04/05/21 
5694 WBC 5.3 HGB 11.5 HCT 35.1  Recent Labs 04/05/21 
0659   
K 5.4*  
 CO2 27 * BUN 25* CREA 1.07* CA 8.3* ALB 2.4* TBILI 0.2 ALT 25 Signed: Tosin Luo MD 
 
X-ray chest:The cardiac silhouette is stable. Lung volumes remain low. Opacities 
in lungs most pronounced at the bases are not significantly changed. No 
hydrostatic edema, pleural effusion or pneumothorax is identified. The osseous 
structures are stable. Reviewed most current lab test results and cultures  YES Reviewed most current radiology test results   YES Review and summation of old records today    NO Reviewed patient's current orders and MAR    YES 
PMH/SH reviewed - no change compared to H&P Assessment / Plan: 
Acute respiratory failure with hypoxia secondary to COVID-19 pneumonia-patient presented with acute respiratory failure with hypoxia requiring high flow nasal cannula for ventilatory support and based on patient's clinical presentation secondary to COVID-19 pneumonia, patient does not meet sepsis criteria at this time Follow up blood cultures Follow up sputum cultures Continue Remdesivir day 5/5 Continue Decadron 6 mg IV every 8 Continue azithromycin once daily Continue to monitor respiratory status Pulmonology consult appreciated, continue to follow recommendations Infectious disease consult appreciated, continue to follow recommendations Hypertensioncontinue home antihypertensive medications Hypothyroidismcontinue Synthroid Gastroesophageal reflux diseasecontinue Protonix once daily Hyperglycemia due to type 2 diabetes - likely iatrogenic, currently controlled 
continue lispro tid according to 24 hour coverage Continue insulin sliding scale Depression/anxietycontinue home Zoloft ProphylaxisLovenox FENcardiac diet, replete potassium and magnesium Full code, OREN is daughter Yuriy Zapata [phone] Dispositionpending clinical improvement/Pt/OT evaluation 25.0 - 29.9 Overweight / Body mass index is 36.84 kg/m². Code status: Full Prophylaxis: Lovenox Recommended Disposition:  PT, OT, RN  
 
________________________________________________________________________ Care Plan discussed with: 
  Comments Patient X Family RN X   
Care Manager X Consultant  X   
                 X Multidiciplinary team rounds were held today with , nursing, pharmacist and clinical coordinator. Patient's plan of care was discussed; medications were reviewed and discharge planning was addressed. ________________________________________________________________________ Total NON critical care TIME:  35   Minutes Comments >50% of visit spent in counseling and coordination of care X   
________________________________________________________________________ Munir Hunter MD

## 2021-04-08 NOTE — PROGRESS NOTES
Infectious Disease Progress Note Subjective:  
Reports generalized weakness today, had a good rest last night. Remains afebrile and on high flow. No acute events since last seen Objective:  
Physical Exam:  
 
Visit Vitals /77 (BP 1 Location: Left upper arm, BP Patient Position: Supine; At rest) Pulse 93 Temp 98.5 °F (36.9 °C) Resp 20 Ht 5' 4.5\" (1.638 m) Wt 218 lb (98.9 kg) SpO2 90% Breastfeeding No  
BMI 36.84 kg/m² O2 Flow Rate (L/min): 50 l/min O2 Device: Heated, Hi flow nasal cannula Temp (24hrs), Av.1 °F (36.7 °C), Min:97.7 °F (36.5 °C), Max:98.5 °F (36.9 °C) 701 - 1900 In: 118 [P.O.:118] Out: 300 [Urine:300]   1901 -  0700 In: 240 [P.O.:240] Out: 900 [Urine:900] General: NAD, AAO x 4 HEENT: MICHELLE, Moist mucosa, on high flow Lungs: CTA b/l, no wheeze/rhonchi Heart: S1S2+, RRR, no murmur Abdo: Soft, NT, ND, +BS Exts: No edema, + pulses b/l  
Skin: No wounds, No rashes or lesions Data Review:  
   
Recent Days: 
Recent Labs 21 
9029 21 
0630 21 
0840 WBC 12.5* 11.6* 10.8 HGB 11.1* 10.5* 10.9* HCT 34.3* 32.1* 33.0*  
 359 346 Recent Labs 21 
5188 21 
0630 21 
0840 BUN 33* 35* 33* CREA 0.96 0.98 0.99 Lab Results Component Value Date/Time C-Reactive protein 5.09 (H) 2021 06:30 AM  
  
 
Microbiology: None Diagnostics CXR Results  (Last 48 hours) 21 0828  XR CHEST PORT Final result Narrative:  Chest single view. Comparison single view chest 2021 Similar minor hazy reticular markings throughout the lungs. Findings would fit  
with clinical concern for infectious/inflammatory process including viral  
etiologies. Cardiac and mediastinal structures unchanged. No pneumothorax or  
sizable pleural effusion. Assessment/Plan 1.  COVID-19 pneumonitis, Remains on high flow oxygen w decreasing O2 requirements LDH elevated at 458, DD 0.54, Ferritin is 195 On day #5 of remdesivi, decadron and azithromycin, S/p 1 dose of Tocilizumab Held off on 2nd dose of Tocilizumab due normal Ferritin, unclear if a 2nd dose will make a difference Continue Decadron as ordered, no focal consolidation on CXR requiring broadening of antibiotics Routine labs in the morning 2. Hypoxia due to # 1, remains on high flow O2, w decreasing O2 requirements 3. IZA: Resolved, Cr at baseline 4. H/o breast and brain Ca per records Richard Blanco MD 
 
4/8/2021

## 2021-04-09 NOTE — PROGRESS NOTES
Infectious Disease Progress Note Subjective:  
Subjectively better still with generalized weakness, remains on high flow at 80% FiO2, denies productive cough Objective:  
Physical Exam:  
 
Visit Vitals /69 (BP 1 Location: Left upper arm, BP Patient Position: At rest;Supine) Pulse 81 Temp 96.9 °F (36.1 °C) Resp 20 Ht 5' 4.5\" (1.638 m) Wt 218 lb (98.9 kg) SpO2 92% Breastfeeding No  
BMI 36.84 kg/m² O2 Flow Rate (L/min): 50 l/min O2 Device: Hi flow nasal cannula Temp (24hrs), Av.5 °F (36.4 °C), Min:96.9 °F (36.1 °C), Max:98 °F (36.7 °C) 
  701 - 1900 In: 118 [P.O.:118] Out: -    1901 -  07 In: 118 [P.O.:118] Out: 950 [Urine:950] General: NAD, AAO x 4 HEENT: MICHELLE, Moist mucosa, on high flow Lungs: CTA b/l, no wheeze/rhonchi Heart: S1S2+, RRR, no murmur Abdo: Soft, NT, ND, +BS Exts: No edema, + pulses b/l  
Skin: No wounds, No rashes or lesions Data Review:  
   
Recent Days: 
Recent Labs 21 
5636 21 
4852 21 
0630 WBC 14.1* 12.5* 11.6* HGB 11.9 11.1* 10.5* HCT 36.1 34.3* 32.1*  
 340 359 Recent Labs 21 
4452 21 
6754 21 
0630 BUN 34* 33* 35* CREA 1.03* 0.96 0.98 Lab Results Component Value Date/Time C-Reactive protein 1.69 (H) 2021 08:53 AM  
  
 
Microbiology: None Diagnostics CXR Results  (Last 48 hours) 21 1200  XR CHEST PORT Final result Narrative:  1 view comparison the seventh Mild patchy peripheral interstitial infiltrates in the lower portions of each  
lung, little changed. No effusion. Normal heart and mediastinum Assessment/Plan 1. COVID-19 pneumonitis, patchy b/l infiltrates on CXR, remains on high flow LDH elevated at 458, DD 0.54, Ferritin is 195 S/p 5 days of Remdesivir and Azithromycin, s/p tocilizumab x 1 dose. Remains on IV Decadron q 8 hours     Rising WBC may be from steroid therapy, since pt afebrile and CRP trending down on serial labs Routine labs in the morning, continue to wean off O2 as tolerated Suspect obesity hypoventilation syndrome, continue pulmonary toilet 2. Hypoxia due to # 1, Remains on high flow 3. IZA: Resolved, mild rise in Cr on todays labs 4. H/o breast and brain Ca per records Bruno Lopez MD 
 
4/9/2021

## 2021-04-09 NOTE — PROGRESS NOTES
Problem: Risk for Spread of Infection Goal: Prevent transmission of infectious organism to others Description: Prevent the transmission of infectious organisms to other patients, staff members, and visitors. Outcome: Progressing Towards Goal 
  
Problem: Falls - Risk of 
Goal: *Absence of Falls Description: Document Nabila Rivasrosey Fall Risk and appropriate interventions in the flowsheet. Outcome: Not Progressing Towards Goal 
Note: Fall Risk Interventions: 
Mobility Interventions: Bed/chair exit alarm Mentation Interventions: Bed/chair exit alarm Elimination Interventions: Bed/chair exit alarm, Call light in reach History of Falls Interventions: Bed/chair exit alarm Problem: Pressure Injury - Risk of 
Goal: *Prevention of pressure injury Description: Document Arsh Scale and appropriate interventions in the flowsheet. Outcome: Not Progressing Towards Goal 
Note: Pressure Injury Interventions: 
Sensory Interventions: Assess changes in LOC Moisture Interventions: Absorbent underpads Activity Interventions: PT/OT evaluation Mobility Interventions: HOB 30 degrees or less Nutrition Interventions: Document food/fluid/supplement intake Friction and Shear Interventions: Apply protective barrier, creams and emollients

## 2021-04-09 NOTE — PROGRESS NOTES
Pulmonary and Critical Care Progress Note Subjective:  
 
Patient seen and examined in her room's afternoon, feeling a little better today. Continuing on high flow nasal cannula 50L, 80%; will turn her down to 75% FiO2. Wean for sats greater than 90%. Currently on day 6/7 of IV azithromycin, day 6/10 of Decadron 6 mg IV every 8 hours, and completed a 5-day course of remdesivir. Received a dose of tocilizumab on 4/5/20 by infectious disease. CRP continues to go down, LDH has gone up, ferritin currently pending. We will continue to monitor to see if she needs a second dose of tocilizumab. Continue on nightly CPAP Repeat a chest x-ray and an ABG in the morning 
  
 
 
Review of Systems: A comprehensive review of systems was negative except for that written in the HPI. Current Facility-Administered Medications Medication Dose Route Frequency Provider Last Rate Last Admin  insulin lispro (HUMALOG) injection 3 Units  3 Units SubCUTAneous Rober Valdez MD   3 Units at 04/09/21 2306  dextrose (D50W) injection syrg 12.5-25 g  25-50 mL IntraVENous PRN Nereida Aranda MD      
 insulin lispro (HUMALOG) injection   SubCUTAneous AC&HS Nereida Aranda MD   2 Units at 04/09/21 7720  
 atorvastatin (LIPITOR) tablet 10 mg  10 mg Oral DAILY Tamra Nelson MD   10 mg at 04/09/21 4019  imipramine (TOFRANIL) tablet 50 mg  50 mg Oral QHS Tamra Nelson MD   50 mg at 04/08/21 2247  levothyroxine (SYNTHROID) tablet 88 mcg  88 mcg Oral ACB Tamra Nelson MD   88 mcg at 04/09/21 8179  lisinopril-hydroCHLOROthiazide (PRINZIDE, ZESTORETIC) 10-12.5 mg per tablet 1 Tab  1 Tab Oral DAILY Tamra Nelson MD   1 Tab at 04/09/21 7017  meloxicam (MOBIC) tablet 7.5 mg  7.5 mg Oral DAILY Tamra Nelson MD   7.5 mg at 04/09/21 9514  pantoprazole (PROTONIX) tablet 20 mg  20 mg Oral DAILY Tamra Nelson MD   20 mg at 04/09/21 0720  sertraline (ZOLOFT) tablet 100 mg  100 mg Oral DAILY Mellissa Ribeiro MD   100 mg at 21 7995  cyclobenzaprine (FLEXERIL) tablet 10 mg  10 mg Oral TID Mellissa Ribeiro MD   10 mg at 21 3150  azithromycin (ZITHROMAX) 500 mg in 0.9% sodium chloride 250 mL (VIAL-MATE)  500 mg IntraVENous Q24H Mellissa Ribeiro MD   500 mg at 21 1650  
 glucose chewable tablet 16 g  4 Tab Oral PRN Mellissa Ribeiro MD      
 dextrose (D50W) injection syrg 12.5-25 g  25-50 mL IntraVENous PRN Mellissa Ribeiro MD      
 glucagon [facility]) injection 1 mg  1 mg IntraMUSCular PRN Mellissa Ribeiro MD      
 zinc sulfate (ZINCATE) 50 mg zinc (220 mg) capsule 1 Cap  1 Cap Oral DAILY Mellissa Ribeiro MD   1 Cap at 21 0206  enoxaparin (LOVENOX) injection 40 mg  40 mg SubCUTAneous Q24H Mellissa Ribeiro MD   40 mg at 21 1650  acetaminophen (TYLENOL) tablet 650 mg  650 mg Oral Q6H PRN Mellissa Ribeiro MD   650 mg at 21 2352  melatonin tablet 5 mg  5 mg Oral QHS PRN Mellissa Ribeiro MD      
 [facility]ron [facility]) [facility] 4 mg  4 mg IntraVENous Q4H PRN Mellissa Ribeiro MD   4 mg at 21 2352  dexamethasone (DECADRON) 4 mg/mL injection 6 mg  6 mg IntraVENous Q8H Chelsea Fajardo MD   6 mg at 21 0545 No Known Allergies Objective:  
 
Blood pressure 122/69, pulse 81, temperature 96.9 °F (36.1 °C), resp. rate 20, height 5' 4.5\" (1.638 m), weight 98.9 kg (218 lb), SpO2 92 %, not currently breastfeeding. Temp (24hrs), Av.8 °F (36.6 °C), Min:96.9 °F (36.1 °C), Max:98.5 °F (36.9 °C) Intake and Output: 
Current Shift: 701 - 1900 In: 118 [P.O.:118] Out: - Last 3 Shifts: 1901 -  0700 In: 118 [P.O.:118] Out: 950 [Urine:950] Physical Exam:  
 
General: Lying in bed comfortably, no acute distress, on high flow nasal cannula. Eye: Reactive, symmetric Throat and Neck: Supple Lung: Reduced air entry bilaterally with prolonged exhalation but no wheezing. Occasional crackles. Currently on high flow nasal cannula. Heart: S1+S2. No murmurs Abdomen: soft, non-tender. Bowel sounds normal. No masses; obese Extremities: No edema : Not done Skin: No cyanosis Neurologic: A & O x3. Grossly nonfocal 
Psychiatric: Appropriate affect; coherent Lab/Data Review: 
 
Recent Results (from the past 24 hour(s)) GLUCOSE, POC Collection Time: 04/08/21 12:35 PM  
Result Value Ref Range Glucose (POC) 154 (H) 65 - 100 mg/dL Performed by Vikki Gaucher [facility] [place]) GLUCOSE, POC Collection Time: 04/08/21  5:01 PM  
Result Value Ref Range Glucose (POC) 98 65 - 100 mg/dL Performed by Aubree Schilling GLUCOSE, POC Collection Time: 04/08/21  8:46 PM  
Result Value Ref Range Glucose (POC) 115 (H) 65 - 100 mg/dL Performed by Claudia Lira, POC Collection Time: 04/09/21  8:33 AM  
Result Value Ref Range Glucose (POC) 173 (H) 65 - 100 mg/dL Performed by Mike Gonsales METABOLIC PANEL, COMPREHENSIVE Collection Time: 04/09/21  8:53 AM  
Result Value Ref Range Sodium 136 136 - 145 mmol/L Potassium 4.4 3.5 - 5.1 mmol/L Chloride 102 97 - 108 mmol/L  
 CO2 26 21 - 32 mmol/L Anion gap 8 5 - 15 mmol/L Glucose 157 (H) 65 - 100 mg/dL BUN 34 (H) 6 - 20 mg/dL Creatinine 1.03 (H) 0.55 - 1.02 mg/dL BUN/Creatinine ratio 33 (H) 12 - 20 GFR est AA >60 >60 ml/min/1.73m2 GFR est non-AA 52 (L) >60 ml/min/1.73m2 Calcium 8.8 8.5 - 10.1 mg/dL Bilirubin, total 0.3 0.2 - 1.0 mg/dL AST (SGOT) 43 (H) 15 - 37 U/L  
 ALT (SGPT) 37 12 - 78 U/L Alk. phosphatase 97 45 - 117 U/L Protein, total 6.1 (L) 6.4 - 8.2 g/dL Albumin 2.7 (L) 3.5 - 5.0 g/dL Globulin 3.4 2.0 - 4.0 g/dL A-G Ratio 0.8 (L) 1.1 - 2.2 C REACTIVE PROTEIN, QT Collection Time: 04/09/21  8:53 AM  
Result Value Ref Range C-Reactive protein 1.69 (H) 0.00 - 0.60 mg/dL LD  Collection Time: 04/09/21  8:53 AM  
Result Value Ref Range  (H) 81 - 246 U/L  
D DIMER Collection Time: 04/09/21  8:53 AM  
Result Value Ref Range D DIMER 0.73 (H) <0.50 ug/ml(FEU) CBC W/O DIFF Collection Time: 04/09/21  8:53 AM  
Result Value Ref Range WBC 14.1 (H) 3.6 - 11.0 K/uL  
 RBC 4.22 3.80 - 5.20 M/uL  
 HGB 11.9 11.5 - 16.0 g/dL HCT 36.1 35.0 - 47.0 % MCV 85.5 80.0 - 99.0 FL  
 MCH 28.2 26.0 - 34.0 PG  
 MCHC 33.0 30.0 - 36.5 g/dL  
 RDW 16.2 (H) 11.5 - 14.5 % PLATELET 687 082 - 991 K/uL MPV 10.1 8.9 - 12.9 FL  
MAGNESIUM Collection Time: 04/09/21  8:53 AM  
Result Value Ref Range Magnesium 2.1 1.6 - 2.4 mg/dL GLUCOSE, POC Collection Time: 04/09/21 11:21 AM  
Result Value Ref Range Glucose (POC) 216 (H) 65 - 100 mg/dL Performed by Jamilah Maurice   
 
 
XR CHEST PORT Final Result XR CHEST PORT Final Result No significant interval change. XR CHEST PORT Final Result XR CHEST PORT    (Results Pending) CT Results  (Last 48 hours) None Assessment: 1. Acute respiratory failure with hypoxia 2. COVID-19 pneumonia 3. Shortness of breath 4.  Cough 5. Generalized weakness 6. Acute kidney injury 7. Obstructive sleep apnea on CPAP 
8. Obesity Plan:  
 
Currently on high flow nasal cannula 50 L,75%. Will use oxygen supplementation as needed to keep saturation above 90% Patient has acute respiratory failure with hypoxia due to COVID-19 pneumonia Status post 5 days of remdesivir Day 6/10 of Decadron 6 mg IV every 8 hours, if she improves we will reduce the frequency of this medication. 6/7 of azithromycin Status post tocilizumab on 4/5/2021 Vitamin C and zinc 
D-dimer only mildly elevated 0.54, the patient does not require therapeutic anticoagulation. CRP decreasing yesterday morning, repeat inflammatory markers for the most part are getting better, ferritin currently pending.   Possibly could qualify for tocilizumab dose #2 tomorrow. Chest x-ray overall fairly unremarkable for degree of hypoxemia. TTE with bubble study currently pending. Renal function stable today. Monitor renal function with fluid changes Check electrolytes and replace as needed CPAP at night DVT and GI prophylaxis Questions of patient were answered at bedside in detail Case discussed in detail with RN, RT, and care team 
Thank you for involving me in the care of this patient I will follow with you closely during hospitalization Greater than 30 minutes were spent in direct care with this patient. Ramez Pérez,  
[facility] 4/9/2021 
8:22 PM

## 2021-04-09 NOTE — PROGRESS NOTES
15: 05PM Outbound call to patient's daughter Wayne Qiu, @ [phone]. Identified self, role, and nature of the call. Informed Gilbert Mejia, her mother has been accepted with two different [facility] agencies' and a decision will need to be made re: choice. Daughter has requested the referral be booked w/[facility] for [facility]. KHOA Valle 
 
 
 
 
14:45PM Spoke w/patient re: acceptance of [facility] choices. Patient requested writer speak w/her daughter. KHOA Valle 
 Patient has been accepted w/two [facility] agencies'. Writer to inform patient to make a decision re: [facility] agency. KHOA Valle

## 2021-04-09 NOTE — PROGRESS NOTES
Hospitalist Progress Note NAME: Britni Chauhan :  1945 MRN:  [id number] Subjective: Chief Complaint / Reason for Physician Visit Patient seen and evaluated at bedside, of note patient states her shortness of breath has improved slightly, currently appears comfortable on HFNC. Discussed with RN events overnight. Review of Systems: 
Symptom Y/N Comments  Symptom Y/N Comments Fever/Chills N   Chest Pain N Poor Appetite Y   Edema N   
Cough Y   Abdominal Pain N Sputum Y   Joint Pain N   
SOB/MADERA Y   Pruritis/Rash N   
Nausea/vomit N   Tolerating PT/OT NA Diarrhea N   Tolerating Diet Y Constipation N   Other Could NOT obtain due to:   
Patient denies any fevers chills nausea vomiting lightheadedness dizziness chest pain palpitations headache focal weakness loss sensation auditory or visual symptoms abdominal stool or urinary complaints or any other associated symptoms Objective: VITALS:  
Last 24hrs VS reviewed since prior progress note. Most recent are: 
Patient Vitals for the past 24 hrs: 
 Temp Pulse Resp BP SpO2  
21 1041     92 % 21 1035     90 % 21 0835 96.9 °F (36.1 °C) 81 20 122/69 92 % 21 0145     92 % 21 2135     92 % 21 2130     93 % 21 2111 98 °F (36.7 °C)  20 126/69 93 % 21 1523 98.5 °F (36.9 °C) 93 20 127/77  Intake/Output Summary (Last 24 hours) at 2021 1303 Last data filed at 2021 2964 Gross per 24 hour Intake 118 ml Output  Net 118 ml PHYSICAL EXAM: 
General: Patient appears uncomfortable EENT:  EOMI. Anicteric sclerae. MMM Resp:  Decreased air entry bilaterally with appreciable bilateral bibasilar crackle CV:  Regular  rhythm, S1 plus S2, no murmurs rubs or gallops  No edema GI:  Soft, Non distended, Non tender. +Bowel sounds Neurologic:  Alert and oriented X 3, normal speech, Psych:   Good insight.  Not anxious nor agitated Skin:  No rashes. No jaundice Procedures: see electronic medical records for all procedures/Xrays and details which were not copied into this note but were reviewed prior to creation of Plan. LABS: 
I reviewed today's most current labs and imaging studies. Pertinent labs include: 
Recent Labs 04/05/21 
0008 WBC 5.3 HGB 11.5 HCT 35.1  Recent Labs 04/05/21 
9215   
K 5.4*  
 CO2 27 * BUN 25* CREA 1.07* CA 8.3* ALB 2.4* TBILI 0.2 ALT 25 Signed: Tosin Luo MD 
 
X-ray chest:The cardiac silhouette is stable. Lung volumes remain low. Opacities 
in lungs most pronounced at the bases are not significantly changed. No 
hydrostatic edema, pleural effusion or pneumothorax is identified. The osseous 
structures are stable. Reviewed most current lab test results and cultures  YES Reviewed most current radiology test results   YES Review and summation of old records today    NO Reviewed patient's current orders and MAR    YES 
PMH/SH reviewed - no change compared to H&P Assessment / Plan: 
Acute respiratory failure with hypoxia secondary to COVID-19 pneumonia-patient presented with acute respiratory failure with hypoxia requiring high flow nasal cannula for ventilatory support and based on patient's clinical presentation secondary to COVID-19 pneumonia, patient does not meet sepsis criteria at this time Follow up blood cultures Follow up sputum cultures Completed Redemsivir Continue Decadron 6 mg IV every 8 Continue azithromycin once daily Continue to monitor respiratory status Pulmonology consult appreciated, continue to follow recommendations Infectious disease consult appreciated, continue to follow recommendations Hypertensioncontinue home antihypertensive medications Hypothyroidismcontinue Synthroid Gastroesophageal reflux diseasecontinue Protonix once daily Hyperglycemia due to type 2 diabetes - likely iatrogenic, currently controlled 
continue lispro tid according to 24 hour coverage Continue insulin sliding scale Depression/anxietycontinue home Zoloft ProphylaxisLovenox FENcardiac diet, replete potassium and magnesium Full code, POA is daughter Zoila Saab [phone] Dispositionpending clinical improvement/Pt/OT evaluation 25.0 - 29.9 Overweight / Body mass index is 36.84 kg/m². Code status: Full Prophylaxis: Lovenox Recommended Disposition:  PT, OT, RN  
 
________________________________________________________________________ Care Plan discussed with: 
  Comments Patient X Family RN X   
Care Manager X Consultant  X   
                 X Multidiciplinary team rounds were held today with , nursing, pharmacist and clinical coordinator. Patient's plan of care was discussed; medications were reviewed and discharge planning was addressed. ________________________________________________________________________ Total NON critical care TIME:  35   Minutes Comments >50% of visit spent in counseling and coordination of care X   
________________________________________________________________________ Ugo Menendez MD

## 2021-04-10 NOTE — PROGRESS NOTES
Hospitalist Progress Note NAME: Paola Juárez :  1945 MRN:  [id number] Subjective: Chief Complaint / Reason for Physician Visit Patient seen and evaluated at bedside, of note patient states her shortness of breath has improved slightly, currently appears comfortable on HFNC. Discussed with RN events overnight. Review of Systems: 
Symptom Y/N Comments  Symptom Y/N Comments Fever/Chills N   Chest Pain N Poor Appetite Y   Edema N   
Cough Y   Abdominal Pain N Sputum Y   Joint Pain N   
SOB/MADERA Y   Pruritis/Rash N   
Nausea/vomit N   Tolerating PT/OT NA Diarrhea N   Tolerating Diet Y Constipation N   Other Could NOT obtain due to:   
Patient denies any fevers chills nausea vomiting lightheadedness dizziness chest pain palpitations headache focal weakness loss sensation auditory or visual symptoms abdominal stool or urinary complaints or any other associated symptoms Objective: VITALS:  
Last 24hrs VS reviewed since prior progress note. Most recent are: 
Patient Vitals for the past 24 hrs: 
 Temp Pulse Resp BP SpO2  
04/10/21 1614 97.4 °F (36.3 °C) 92 22 127/75 95 % 04/10/21 1613     93 % 04/10/21 1052     92 % 04/10/21 0955     93 % 04/10/21 0756 97.1 °F (36.2 °C) 93 20 139/74   
21 2128 98.6 °F (37 °C) 80 20 120/78 93 % No intake or output data in the 24 hours ending 04/10/21 1736 PHYSICAL EXAM: 
General: Patient appears uncomfortable EENT:  EOMI. Anicteric sclerae. MMM Resp:  Decreased air entry bilaterally with appreciable bilateral bibasilar crackle CV:  Regular  rhythm, S1 plus S2, no murmurs rubs or gallops  No edema GI:  Soft, Non distended, Non tender. +Bowel sounds Neurologic:  Alert and oriented X 3, normal speech, Psych:   Good insight. Not anxious nor agitated Skin:  No rashes. No jaundice Procedures: see electronic medical records for all procedures/Xrays and details which were not copied into this note but were reviewed prior to creation of Plan. LABS: 
I reviewed today's most current labs and imaging studies. Pertinent labs include: 
Recent Labs 04/05/21 
5568 WBC 5.3 HGB 11.5 HCT 35.1  Recent Labs 04/05/21 
7585   
K 5.4*  
 CO2 27 * BUN 25* CREA 1.07* CA 8.3* ALB 2.4* TBILI 0.2 ALT 25 Signed: Sofya Carrasquillo MD 
 
X-ray chest:The cardiac silhouette is stable. Lung volumes remain low. Opacities 
in lungs most pronounced at the bases are not significantly changed. No 
hydrostatic edema, pleural effusion or pneumothorax is identified. The osseous 
structures are stable. Reviewed most current lab test results and cultures  YES Reviewed most current radiology test results   YES Review and summation of old records today    NO Reviewed patient's current orders and MAR    YES 
PMH/SH reviewed - no change compared to H&P Assessment / Plan: 
Acute respiratory failure with hypoxia secondary to COVID-19 pneumonia-patient presented with acute respiratory failure with hypoxia requiring high flow nasal cannula for ventilatory support and based on patient's clinical presentation secondary to COVID-19 pneumonia, patient does not meet sepsis criteria at this time Follow up blood cultures Follow up sputum cultures Completed Redemsivir Continue Decadron 6 mg IV every 8 Continue azithromycin once daily Continue to monitor respiratory status Pulmonology consult appreciated, continue to follow recommendations Infectious disease consult appreciated, continue to follow recommendations Hypertensioncontinue home antihypertensive medications Hypothyroidismcontinue Synthroid Gastroesophageal reflux diseasecontinue Protonix once daily Hyperglycemia due to type 2 diabetes - likely iatrogenic, currently controlled 
continue lispro tid according to 24 hour coverage Continue insulin sliding scale Depression/anxietycontinue home Zoloft ProphylaxisLovenox FENcardiac diet, replete potassium and magnesium Full code, PODANILO is daughter Maximino Salazar [phone] Dispositionpending clinical improvement/Pt/OT evaluation 25.0 - 29.9 Overweight / Body mass index is 36.84 kg/m². Code status: Full Prophylaxis: Lovenox Recommended Disposition:  PT, OT, RN  
 
________________________________________________________________________ Care Plan discussed with: 
  Comments Patient X Family RN X   
Care Manager X Consultant  X   
                 X Multidiciplinary team rounds were held today with , nursing, pharmacist and clinical coordinator. Patient's plan of care was discussed; medications were reviewed and discharge planning was addressed. ________________________________________________________________________ Total NON critical care TIME:  35   Minutes Comments >50% of visit spent in counseling and coordination of care X   
________________________________________________________________________ Parris Curling, MD

## 2021-04-11 NOTE — PROGRESS NOTES
Patient is being transferred to ICU bed 284. Accepted w/[facility] agency. SOC to begin x 24-48 hours after discharge. KHOA Rhodes

## 2021-04-11 NOTE — PROGRESS NOTES
Patient arrival to CVU accompanied by RN and RRT. Patient on Hi flow NC. Patient Alert and following commands. VSS at this time. Four eye skin assessment completed. Patient noted to have moisture associated skin damage bilaterally under breasts and pannus. Patient also has a healing midline abdominal scab. Otherwise skin intact.

## 2021-04-11 NOTE — PROGRESS NOTES
Hospitalist Progress Note NAME: Seth Garcia :  1945 MRN:  [id number] Subjective: Chief Complaint / Reason for Physician Visit Patient seen and evaluated at bedside, of note patient states her shortness of breath has improved slightly, currently appears comfortable on HFNC. Discussed with RN events overnight. Review of Systems: 
Symptom Y/N Comments  Symptom Y/N Comments Fever/Chills N   Chest Pain N Poor Appetite Y   Edema N   
Cough Y   Abdominal Pain N Sputum Y   Joint Pain N   
SOB/MADERA Y   Pruritis/Rash N   
Nausea/vomit N   Tolerating PT/OT NA Diarrhea N   Tolerating Diet Y Constipation N   Other Could NOT obtain due to:   
Patient denies any fevers chills nausea vomiting lightheadedness dizziness chest pain palpitations headache focal weakness loss sensation auditory or visual symptoms abdominal stool or urinary complaints or any other associated symptoms Objective: VITALS:  
Last 24hrs VS reviewed since prior progress note. Most recent are: 
Patient Vitals for the past 24 hrs: 
 Temp Pulse Resp BP SpO2  
21 0916 98.4 °F (36.9 °C) 93 22 136/80 93 % 21 0742     93 % 04/10/21 2031 97 °F (36.1 °C) 90 22 122/81 97 % 04/10/21 1614 97.4 °F (36.3 °C) 92 22 127/75 95 % 04/10/21 1613     93 % Intake/Output Summary (Last 24 hours) at 2021 1322 Last data filed at 4/10/2021 1840 Gross per 24 hour Intake 500 ml Output  Net 500 ml PHYSICAL EXAM: 
General: Patient appears uncomfortable EENT:  EOMI. Anicteric sclerae. MMM Resp:  Decreased air entry bilaterally with appreciable bilateral bibasilar crackle CV:  Regular  rhythm, S1 plus S2, no murmurs rubs or gallops  No edema GI:  Soft, Non distended, Non tender. +Bowel sounds Neurologic:  Alert and oriented X 3, normal speech, Psych:   Good insight. Not anxious nor agitated Skin:  No rashes. No jaundice Procedures: see electronic medical records for all procedures/Xrays and details which were not copied into this note but were reviewed prior to creation of Plan. LABS: 
I reviewed today's most current labs and imaging studies. Pertinent labs include: 
Recent Labs 04/05/21 
8807 WBC 5.3 HGB 11.5 HCT 35.1  Recent Labs 04/05/21 
8143   
K 5.4*  
 CO2 27 * BUN 25* CREA 1.07* CA 8.3* ALB 2.4* TBILI 0.2 ALT 25 Signed: Ugo Menendez MD 
 
X-ray chest:The cardiac silhouette is stable. Lung volumes remain low. Opacities 
in lungs most pronounced at the bases are not significantly changed. No 
hydrostatic edema, pleural effusion or pneumothorax is identified. The osseous 
structures are stable. Reviewed most current lab test results and cultures  YES Reviewed most current radiology test results   YES Review and summation of old records today    NO Reviewed patient's current orders and MAR    YES 
PMH/SH reviewed - no change compared to H&P Assessment / Plan: 
Acute respiratory failure with hypoxia secondary to COVID-19 pneumonia-patient presented with acute respiratory failure with hypoxia requiring high flow nasal cannula for ventilatory support and based on patient's clinical presentation secondary to COVID-19 pneumonia, patient does not meet sepsis criteria at this time Follow up blood cultures Follow up sputum cultures Completed Redemsivir Continue Decadron 6 mg IV every 8 Continue azithromycin once daily Continue to monitor respiratory status Pulmonology consult appreciated, continue to follow recommendations Infectious disease consult appreciated, continue to follow recommendations Hypertensioncontinue home antihypertensive medications Hypothyroidismcontinue Synthroid Gastroesophageal reflux diseasecontinue Protonix once daily Hyperglycemia due to type 2 diabetes - likely iatrogenic, currently controlled 
continue lispro tid according to 24 hour coverage Continue insulin sliding scale Depression/anxietycontinue home Zoloft ProphylaxisLovenox FENcardiac diet, replete potassium and magnesium Full code, POA is daughter Brant Guzman [phone] Dispositionpending clinical improvement/Pt/OT evaluation 25.0 - 29.9 Overweight / Body mass index is 36.84 kg/m². Code status: Full Prophylaxis: Lovenox Recommended Disposition:  PT, OT, RN  
 
________________________________________________________________________ Care Plan discussed with: 
  Comments Patient X Family RN X   
Care Manager X Consultant  X   
                 X Multidiciplinary team rounds were held today with , nursing, pharmacist and clinical coordinator. Patient's plan of care was discussed; medications were reviewed and discharge planning was addressed. ________________________________________________________________________ Total NON critical care TIME:  35   Minutes Comments >50% of visit spent in counseling and coordination of care X   
________________________________________________________________________ Tosin Luo MD

## 2021-04-11 NOTE — INTERDISCIPLINARY ROUNDS
Patient transferred to ICU at [phone]. Report called. Patient transferred on monitor on high flow. Belongings carried with patient. Maki left on desk in ICU per daughter's request. POA notified of transfer. No further questions or concerns at this time.

## 2021-04-12 NOTE — PROGRESS NOTES
Problem: Risk for Spread of Infection Goal: Prevent transmission of infectious organism to others Description: Prevent the transmission of infectious organisms to other patients, staff members, and visitors. Outcome: Progressing Towards Goal 
  
Problem: Patient Education:  Go to Education Activity Goal: Patient/Family Education Outcome: Progressing Towards Goal 
  
Problem: Falls - Risk of 
Goal: *Absence of Falls Description: Document Tika Linares Fall Risk and appropriate interventions in the flowsheet. Outcome: Progressing Towards Goal 
Note: Fall Risk Interventions: 
Mobility Interventions: Communicate number of staff needed for ambulation/transfer, Bed/chair exit alarm, Patient to call before getting OOB, OT consult for ADLs, PT Consult for mobility concerns, PT Consult for assist device competence, Strengthening exercises (ROM-active/passive) Mentation Interventions: Adequate sleep, hydration, pain control, Bed/chair exit alarm, Door open when patient unattended, Evaluate medications/consider consulting pharmacy, More frequent rounding, Reorient patient, Toileting rounds, Update white board Medication Interventions: Assess postural VS orthostatic hypotension, Bed/chair exit alarm, Evaluate medications/consider consulting pharmacy, Patient to call before getting OOB, Teach patient to arise slowly Elimination Interventions: Bed/chair exit alarm, Call light in reach, Patient to call for help with toileting needs, Toileting schedule/hourly rounds History of Falls Interventions: Bed/chair exit alarm, Consult care management for discharge planning, Evaluate medications/consider consulting pharmacy, Door open when patient unattended Problem: Patient Education: Go to Patient Education Activity Goal: Patient/Family Education Outcome: Progressing Towards Goal 
  
Problem: Pressure Injury - Risk of 
Goal: *Prevention of pressure injury Description: Document Arsh Scale and appropriate interventions in the flowsheet. Outcome: Progressing Towards Goal 
Note: Pressure Injury Interventions: 
Sensory Interventions: Assess changes in LOC, Check visual cues for pain, Discuss PT/OT consult with provider, Avoid rigorous massage over bony prominences, Turn and reposition approx. every two hours (pillows and wedges if needed), Keep linens dry and wrinkle-free, Maintain/enhance activity level, Minimize linen layers, Monitor skin under medical devices Moisture Interventions: Absorbent underpads, Apply protective barrier, creams and emollients, Check for incontinence Q2 hours and as needed, Minimize layers, Maintain skin hydration (lotion/cream), Moisture barrier Activity Interventions: Pressure redistribution bed/mattress(bed type), PT/OT evaluation Mobility Interventions: Float heels, HOB 30 degrees or less, Pressure redistribution bed/mattress (bed type), PT/OT evaluation, Turn and reposition approx. every two hours(pillow and wedges) Nutrition Interventions: Discuss nutritional consult with provider, Document food/fluid/supplement intake Friction and Shear Interventions: Apply protective barrier, creams and emollients, HOB 30 degrees or less, Transferring/repositioning devices Problem: Patient Education: Go to Patient Education Activity Goal: Patient/Family Education Outcome: Progressing Towards Goal 
  
Problem: Non-Violent Restraints Goal: Removal from restraints as soon as assessed to be safe Outcome: Progressing Towards Goal 
Goal: No harm/injury to patient while restraints in use Outcome: Progressing Towards Goal 
Goal: Patient's dignity will be maintained Outcome: Progressing Towards Goal 
Goal: Patient Interventions Outcome: Progressing Towards Goal

## 2021-04-12 NOTE — PROGRESS NOTES
Infectious Disease Progress Note Subjective:  
Pt seen and examined at bedside. Transferred to the ICU last night due to increased dyspnea and non-compliance w BIPAP Objective:  
Physical Exam:  
 
Visit Vitals /68 Pulse 77 Temp 98.2 °F (36.8 °C) Resp (!) 33 Ht 5' 4.5\" (1.638 m) Wt 208 lb 12.4 oz (94.7 kg) SpO2 97% Breastfeeding No  
BMI 35.28 kg/m² O2 Flow Rate (L/min): 60 l/min O2 Device: Heated, Hi flow nasal cannula Temp (24hrs), Av.1 °F (36.7 °C), Min:98 °F (36.7 °C), Max:98.2 °F (36.8 °C) No intake/output data recorded. 04/10 1901 -  0700 In: -  
Out: [address] General: NAD, lethargic, confused HEENT: MICHELLE, Moist mucosa, on high flow Lungs: CTA b/l, no wheeze/rhonchi Heart: S1S2+, RRR, no murmur Abdo: Soft, NT, ND, +BS Exts: No edema, + pulses b/l  
Skin: No wounds, No rashes or lesions Data Review:  
   
Recent Days: 
Recent Labs 21 
0405 21 
0750 04/10/21 
6573 WBC 13.5* 14.2* 13.3* HGB 12.0 12.8 12.8 HCT 35.2 38.7 38.7  324 315 Recent Labs 21 
0405 21 
0750 04/10/21 
8432 BUN 27* 29* 29* CREA 0.87 0.93 0.93 Lab Results Component Value Date/Time C-Reactive protein 1.69 (H) 2021 08:53 AM  
  
 
Microbiology: None Diagnostics CXR Results  (Last 48 hours) 21 0400  XR CHEST PORT Final result Narrative:  Chest single view. Comparison single view chest 2021 Patchy alveolar opacities through lungs mid and lower lung predominance no  
better compared to prior imaging. Cardiac and mediastinal structures unchanged. No pneumothorax or sizable pleural effusion. 21 1355  XR CHEST PORT Final result Impression:  There is persistent airspace disease within the mid to lower lung  
zone predominance. This represents a heterogeneous pattern as might be  
associated with an atypical pneumonia. There has been little interval change. Narrative: This study is a portable radiograph of the chest dated 4/11/2021 obtained at  
1:50 PM. HISTORY: Cough with pneumonia. COMPARISON: 4/9/2021. FINDINGS: There has been little change in the degree of mixed interstitial and  
airspace disease within the mid to lower lung zones. These findings are most  
compatible with pneumonia. The remainder of this examination is unchanged. Assessment/Plan 1. COVID-19 pneumonitis,   Remains on high flow oxygen, patchy b/l infiltrates on CXR S/p 5 days of Remdesivir and Azithromycin, s/p tocilizumab x 1 dose. Remains on IV Decadron q 8 hours Afebrile, moderate leukocytosis on routine labs D/c Azithromycin, continue on decadron. Monitor off systemic antibiotics for now Routine labs in the morning 2. Hypoxia due to # 1, Remains on high flow 3. IZA: Resolved, Cr at baseline 4. H/o breast and brain Ca per records Chelsea Quan MD 
 
4/12/2021

## 2021-04-12 NOTE — PROGRESS NOTES
Hospitalist Progress Note NAME: Doreen Damon :  1945 MRN:  [id number] Subjective: Chief Complaint / Reason for Physician Visit Patient seen and evaluated at bedside, of note patient states her shortness of breath has improved slightly, currently appears comfortable on HFNC, slept well overnight. Discussed with RN events overnight. Review of Systems: 
Symptom Y/N Comments  Symptom Y/N Comments Fever/Chills N   Chest Pain N Poor Appetite Y   Edema N   
Cough Y   Abdominal Pain N Sputum Y   Joint Pain N   
SOB/MADERA Y   Pruritis/Rash N   
Nausea/vomit N   Tolerating PT/OT NA Diarrhea N   Tolerating Diet Y Constipation N   Other Could NOT obtain due to:   
Patient denies any fevers chills nausea vomiting lightheadedness dizziness chest pain palpitations headache focal weakness loss sensation auditory or visual symptoms abdominal stool or urinary complaints or any other associated symptoms Objective: VITALS:  
Last 24hrs VS reviewed since prior progress note. Most recent are: 
Patient Vitals for the past 24 hrs: 
 Temp Pulse Resp BP SpO2  
21 0811  77  124/68   
21 0600  82 (!) 33 107/66 95 % 21 0500  79 16 135/80 96 % 21 0440     97 % 21 0400  80 19 110/75 92 % 21 0300 98.2 °F (36.8 °C) 82 18 123/74 96 % 21 0200  88 (!) 37 107/70 96 % 21 0100  90 21 100/68 95 % 21 0049     96 % 21 0000  93 22 112/72 96 % 21 2300 98.2 °F (36.8 °C) 91 29 124/76 93 % 21 2200  94 29 115/70 93 % 21 2100  96 25 106/76 95 % 21 2031     92 % 21 2030    112/66 92 % 21 2000  92 30 114/80 (!) 87 % 21 1901 98 °F (36.7 °C) 98 22 126/89 92 % 21 0916 98.4 °F (36.9 °C) 93 22 136/80 93 % Intake/Output Summary (Last 24 hours) at 2021 0532 Last data filed at 2021 9894 Gross per 24 hour Intake  Output 951 ml Net -951 ml PHYSICAL EXAM: 
General: Patient appears uncomfortable EENT:  EOMI. Anicteric sclerae. MMM Resp:  Decreased air entry bilaterally with appreciable bilateral bibasilar crackle CV:  Regular  rhythm, S1 plus S2, no murmurs rubs or gallops  No edema GI:  Soft, Non distended, Non tender. +Bowel sounds Neurologic:  Alert and oriented X 3, normal speech, Psych:   Good insight. Not anxious nor agitated Skin:  No rashes. No jaundice Procedures: see electronic medical records for all procedures/Xrays and details which were not copied into this note but were reviewed prior to creation of Plan. LABS: 
I reviewed today's most current labs and imaging studies. Pertinent labs include: 
Recent Labs 04/05/21 
4656 WBC 5.3 HGB 11.5 HCT 35.1  Recent Labs 04/05/21 
2193   
K 5.4*  
 CO2 27 * BUN 25* CREA 1.07* CA 8.3* ALB 2.4* TBILI 0.2 ALT 25 Signed: Milagro Vasquez MD 
 
X-ray chest:The cardiac silhouette is stable. Lung volumes remain low. Opacities 
in lungs most pronounced at the bases are not significantly changed. No 
hydrostatic edema, pleural effusion or pneumothorax is identified. The osseous 
structures are stable. Reviewed most current lab test results and cultures  YES Reviewed most current radiology test results   YES Review and summation of old records today    NO Reviewed patient's current orders and MAR    YES 
PMH/SH reviewed - no change compared to H&P Assessment / Plan: 
Acute respiratory failure with hypoxia secondary to COVID-19 pneumonia-patient presented with acute respiratory failure with hypoxia requiring high flow nasal cannula for ventilatory support and based on patient's clinical presentation secondary to COVID-19 pneumonia, patient does not meet sepsis criteria at this time Follow up blood cultures Follow up sputum cultures Completed Redemsivir Continue Decadron 6 mg IV every 8 Continue azithromycin once daily Continue to monitor respiratory status Pulmonology consult appreciated, continue to follow recommendations Infectious disease consult appreciated, continue to follow recommendations Hypertensioncontinue home antihypertensive medications Hypothyroidismcontinue Synthroid Gastroesophageal reflux diseasecontinue Protonix once daily Hyperglycemia due to type 2 diabetes - likely iatrogenic, currently controlled 
continue lispro tid according to 24 hour coverage Continue insulin sliding scale Depression/anxietycontinue home Zoloft ProphylaxisLovenox FENcardiac diet, replete potassium and magnesium Full code, OREN is daughter Mark Ny [phone] Dispositiontransfer to telemetry Critical care time spent 35 mins involving direct patient care, reviewing patients labs and co-ordination of care with nursing staff 25.0 - 29.9 Overweight / Body mass index is 36.84 kg/m². Code status: Full Prophylaxis: Lovenox Recommended Disposition:  PT, OT, RN  
 
________________________________________________________________________ Care Plan discussed with: 
  Comments Patient X Family RN X   
Care Manager X Consultant  X   
                 X Multidiciplinary team rounds were held today with , nursing, pharmacist and clinical coordinator. Patient's plan of care was discussed; medications were reviewed and discharge planning was addressed. ________________________________________________________________________ Comments >50% of visit spent in counseling and coordination of care X   
________________________________________________________________________ Hazel Nazario MD

## 2021-04-12 NOTE — ROUTINE PROCESS
Report called to 5E nurse Santos Arreaga RN. Patient transferred to room 529D on telebox and heated hi flow 60L 75%. No signs of any acute distress during transport. Belongings transported with patient in patient's bed.

## 2021-04-12 NOTE — PROGRESS NOTES
Pulmonary and Critical Care Progress Note Subjective:  
 
Patient seen and examined in ICU Overnight events noted But arousable No acute distress Remains on high flow Morning on 60 L 85% FiO2 ABGs showed pH of 7.48, PCO2 36, PO2 56, bicarb 27, saturation 91% Chest x-ray continues to show bilateral patchy infiltrates 
  
 
 
Review of Systems: A comprehensive review of systems was negative except for that written in the HPI. Current Facility-Administered Medications Medication Dose Route Frequency Provider Last Rate Last Admin  QUEtiapine (SEROquel) tablet 12.5 mg  12.5 mg Oral QHS Winnie Marinelli MD   12.5 mg at 04/11/21 2149  
 benzocaine-menthoL (CEPACOL) lozenge 1 Lozenge  1 Lozenge Mucous Membrane PRN Claudetta Sames, MD   1 Lozenge at 04/09/21 1640  nystatin (MYCOSTATIN) 100,000 unit/mL oral suspension 500,000 Units  500,000 Units Oral QID Claudetta Sames, MD   500,000 Units at 04/12/21 9926  insulin lispro (HUMALOG) injection 3 Units  3 Units SubCUTAneous Beryl Mann MD   3 Units at 04/12/21 3934  dextrose (D50W) injection syrg 12.5-25 g  25-50 mL IntraVENous PRN Claudetta Sames, MD      
 insulin lispro (HUMALOG) injection   SubCUTAneous AC&HS Claudetta Sames, MD   Stopped at 04/11/21 1130  
 atorvastatin (LIPITOR) tablet 10 mg  10 mg Oral DAILY Ky Soto MD   10 mg at 04/12/21 8846  imipramine (TOFRANIL) tablet 50 mg  50 mg Oral QHS Ky Soto MD   50 mg at 04/11/21 2149  levothyroxine (SYNTHROID) tablet 88 mcg  88 mcg Oral ACB Ky Soto MD   88 mcg at 04/12/21 8649  lisinopril-hydroCHLOROthiazide (PRINZIDE, ZESTORETIC) 10-12.5 mg per tablet 1 Tab  1 Tab Oral DAILY Ky Soto MD   1 Tab at 04/12/21 0900  
 meloxicam (MOBIC) tablet 7.5 mg  7.5 mg Oral DAILY Ky Soto MD   7.5 mg at 04/12/21 1429  pantoprazole (PROTONIX) tablet 20 mg  20 mg Oral DAILY Viviane Carrera MD   20 mg at 21 8392  sertraline (ZOLOFT) tablet 100 mg  100 mg Oral DAILY Viviane Carrera MD   100 mg at 21 0778  cyclobenzaprine (FLEXERIL) tablet 10 mg  10 mg Oral TID Viviane Carrera MD   10 mg at 21 0807  
 azithromycin (ZITHROMAX) 500 mg in 0.9% sodium chloride 250 mL (VIAL-MATE)  500 mg IntraVENous Q24H Viviane Carrera MD   500 mg at 21 1747  
 glucose chewable tablet 16 g  4 Tab Oral PRN Viviane Carrera MD      
 dextrose (D50W) injection syrg 12.5-25 g  25-50 mL IntraVENous PRN Viviane Carrera MD      
 glucagon [facility]) injection 1 mg  1 mg IntraMUSCular PRN Viviane Carrera MD      
 zinc sulfate (ZINCATE) 50 mg zinc (220 mg) capsule 1 Cap  1 Cap Oral DAILY Viviane Carrera MD   1 Cap at 21 0567  enoxaparin (LOVENOX) injection 40 mg  40 mg SubCUTAneous Q24H Viviane Carrera MD   40 mg at 21 1747  acetaminophen (TYLENOL) tablet 650 mg  650 mg Oral Q6H PRN Viviane Carrera MD   650 mg at 21 2352  melatonin tablet 5 mg  5 mg Oral QHS PRN Viviane Carrera MD   5 mg at 21 2142  ondansetron (ZOFRAN) injection 4 mg  4 mg IntraVENous Q4H PRN Viviane Carrera MD   4 mg at 21 2352  dexamethasone (DECADRON) 4 mg/mL injection 6 mg  6 mg IntraVENous Q8H Chelsea Fajardo MD   6 mg at 21 0504 No Known Allergies Objective:  
 
Blood pressure 124/68, pulse 77, temperature 98.2 °F (36.8 °C), resp. rate (!) 33, height 5' 4.5\" (1.638 m), weight 94.7 kg (208 lb 12.4 oz), SpO2 97 %, not currently breastfeeding. Temp (24hrs), Av.1 °F (36.7 °C), Min:98 °F (36.7 °C), Max:98.2 °F (36.8 °C) Intake and Output: 
Current Shift: No intake/output data recorded. Last 3 Shifts: 04/10 1901 -  0700 In: -  
Out: [address] Physical Exam:  
 
General: Lying in bed comfortably, no acute distress, on high flow nasal cannula. Eye: Reactive, symmetric Throat and Neck: Supple Lung: Reduced air entry bilaterally with prolonged exhalation but no wheezing. Occasional crackles. Currently on high flow nasal cannula. Heart: S1+S2. No murmurs Abdomen: soft, non-tender. Bowel sounds normal. No masses; obese Extremities: No edema : Not done Skin: No cyanosis Neurologic: A & O x3. Grossly nonfocal 
Psychiatric: Appropriate affect; coherent Lab/Data Review: 
 
Recent Results (from the past 24 hour(s)) GLUCOSE, POC Collection Time: 04/11/21 12:01 PM  
Result Value Ref Range Glucose (POC) 121 (H) 65 - 100 mg/dL Performed by Forrest Bender MRSA SCREEN - PCR (NASAL) Collection Time: 04/11/21  3:00 PM  
Result Value Ref Range MRSA by PCR, Nasal Not Detected Not Detected GLUCOSE, POC Collection Time: 04/11/21  4:22 PM  
Result Value Ref Range Glucose (POC) 99 65 - 100 mg/dL Performed by Linnea Brown BLOOD GAS, ARTERIAL Collection Time: 04/11/21  9:06 PM  
Result Value Ref Range pH 7.48 (H) 7.35 - 7.45    
 PCO2 36 35 - 45 mmHg PO2 56 (L) 75 - 100 mmHg O2 SAT 91 (L) >95 % BICARBONATE 27 (H) 22 - 26 mmol/L  
 BASE EXCESS 3.4 (H) 0 - 2 mmol/L  
 O2 METHOD High Flow O2    
 O2 FLOW RATE 60.0 L/min FIO2 85.0 % Sample source Arterial    
 SITE Right Radial    
 JACQUELIN'S TEST PASS    
GLUCOSE, POC Collection Time: 04/11/21 10:14 PM  
Result Value Ref Range Glucose (POC) 125 (H) 65 - 100 mg/dL Performed by Trell Vazquez, POC Collection Time: 04/11/21 11:47 PM  
Result Value Ref Range Glucose (POC) 317 (H) 65 - 100 mg/dL Performed by Deborah Patches METABOLIC PANEL, COMPREHENSIVE Collection Time: 04/12/21  4:05 AM  
Result Value Ref Range Sodium 136 136 - 145 mmol/L Potassium 5.1 3.5 - 5.1 mmol/L Chloride 102 97 - 108 mmol/L  
 CO2 28 21 - 32 mmol/L Anion gap 6 5 - 15 mmol/L Glucose 123 (H) 65 - 100 mg/dL BUN 27 (H) 6 - 20 mg/dL Creatinine 0.87 0.55 - 1.02 mg/dL BUN/Creatinine ratio 31 (H) 12 - 20 GFR est AA >60 >60 ml/min/1.73m2 GFR est non-AA >60 >60 ml/min/1.73m2 Calcium 8.3 (L) 8.5 - 10.1 mg/dL Bilirubin, total 0.5 0.2 - 1.0 mg/dL AST (SGOT) 57 (H) 15 - 37 U/L  
 ALT (SGPT) 34 12 - 78 U/L Alk. phosphatase 82 45 - 117 U/L Protein, total 5.5 (L) 6.4 - 8.2 g/dL Albumin 2.3 (L) 3.5 - 5.0 g/dL Globulin 3.2 2.0 - 4.0 g/dL A-G Ratio 0.7 (L) 1.1 - 2.2    
CBC W/O DIFF Collection Time: 04/12/21  4:05 AM  
Result Value Ref Range WBC 13.5 (H) 3.6 - 11.0 K/uL  
 RBC 4.15 3.80 - 5.20 M/uL  
 HGB 12.0 11.5 - 16.0 g/dL HCT 35.2 35.0 - 47.0 % MCV 84.8 80.0 - 99.0 FL  
 MCH 28.9 26.0 - 34.0 PG  
 MCHC 34.1 30.0 - 36.5 g/dL  
 RDW 16.4 (H) 11.5 - 14.5 % PLATELET 755 869 - 745 K/uL MPV 10.1 8.9 - 12.9 FL  
 
 
XR CHEST PORT Final Result XR CHEST PORT Final Result There is persistent airspace disease within the mid to lower lung  
zone predominance. This represents a heterogeneous pattern as might be  
associated with an atypical pneumonia. There has been little interval change. XR CHEST PORT Final Result XR CHEST PORT Final Result XR CHEST PORT Final Result No significant interval change. XR CHEST PORT Final Result XR CHEST PORT    (Results Pending) CT Results  (Last 48 hours) None Assessment: 1. Acute respiratory failure with hypoxia 2. COVID-19 pneumonia 3. Shortness of breath 4.  Cough 5. Generalized weakness 6. Acute kidney injury 7. Obstructive sleep apnea on CPAP 
8. Obesity Plan:  
 
Currently on high flow nasal cannula 50 L,85%. Decreased to 75% Will use oxygen supplementation as needed to keep saturation above 90% Patient has acute respiratory failure with hypoxia due to COVID-19 pneumonia Status post 5 days of remdesivir Currently on Decadron 6 mg IV every 8 hours, if she improves we will reduce the frequency of this medication.  
On azithromycin for antibiotic coverage Status post tocilizumab on 4/5/2021 Vitamin C and zinc 
D-dimer only mildly elevated 0.54, the patient does not require therapeutic anticoagulation. CRP decreasing yesterday morning, repeat inflammatory markers for the most part are getting better, ferritin currently pending. Possibly could qualify for tocilizumab dose #2 tomorrow. Chest x-ray overall fairly unremarkable for degree of hypoxemia. TTE with bubble study currently pending. Renal function stable today. Monitor renal function with fluid changes Check electrolytes and replace as needed CPAP at night DVT and GI prophylaxis Questions of patient were answered at bedside in detail Case discussed in detail with RN, RT, and care team 
Thank you for involving me in the care of this patient I will follow with you closely during hospitalization Greater than 30 minutes were spent in direct care with this patient. Yohan Cross MD 
[facility] 4/12/2021 
8:22 PM

## 2021-04-12 NOTE — PROGRESS NOTES
Comprehensive Nutrition Assessment Type and Reason for Visit: RD nutrition re-screen/LOS Nutrition Recommendations/Plan:  
Continue Cardiac diet Document all PO intakes in EMR Nutrition Assessment:  COVID+. Previously on med unit on HF NC. Transferred to ICU on BiPAP yesterday. Now on HF NC. Spoke with RN, who reports good intakes even with BiPAP. Reported 100% at B, 60% at L, 75% at D. Labs and meds reviewed. Malnutrition Assessment: 
Malnutrition Status:  No malnutrition Nutrition Related Findings:  NFPE not performed d/t precautions, pt appears well nourished. No documented dysphagia, n/v, or c/d. Last BM 4/12, per RN. No edema documentation. Wounds:   
None Current Nutrition Therapies: DIET CARDIAC Regular Anthropometric Measures: 
· Height:  5' 4.5\" (163.8 cm) · Current Body Wt:  94.7 kg (208 lb 12.4 oz)(4/12) · Ideal Body Wt:  123 lbs:  169.7 % · BMI Category:  Obese class 2 (BMI 35.0-39. 9) Nutrition Diagnosis: No nutrition diagnosis at this time Nutrition Interventions:  
Food and/or Nutrient Delivery: Continue current diet Nutrition Education and Counseling: No recommendations at this time Coordination of Nutrition Care: No recommendation at this time Discharge Planning:   
No discharge needs at this time Electronically signed by Candido Gary on 4/13/2021 at 5:42 PM 
 
Contact:

## 2021-04-12 NOTE — ROUTINE PROCESS
Patient's purse taken home with daughter Gretel Denis. Patient is aware and consented to her daughter assuming possession of her bag. Patent

## 2021-04-13 NOTE — PROGRESS NOTES
Hospitalist Progress Note NAME: Karie Bhakta :  1945 MRN:  [id number] Subjective: Chief Complaint / Reason for Physician Visit Patient seen and evaluated at bedside, Appears nad, remains pleasant and cooperative. Relates some improvement but remains on hfnc. Review of Systems: 
Symptom Y/N Comments  Symptom Y/N Comments Fever/Chills N   Chest Pain N Poor Appetite Y   Edema N   
Cough Y   Abdominal Pain N Sputum Y   Joint Pain N   
SOB/MADERA Y   Pruritis/Rash N   
Nausea/vomit N   Tolerating PT/OT NA Diarrhea N   Tolerating Diet Y Constipation N   Other Could NOT obtain due to:   
Patient denies any fevers chills nausea vomiting lightheadedness dizziness chest pain palpitations headache focal weakness loss sensation auditory or visual symptoms abdominal stool or urinary complaints or any other associated symptoms Objective: VITALS:  
Last 24hrs VS reviewed since prior progress note. Most recent are: 
Patient Vitals for the past 24 hrs: 
 Temp Pulse Resp BP SpO2  
21 1417 99.5 °F (37.5 °C) 93 22 98/63 98 % 21 1226     97 % 21 0859 99.5 °F (37.5 °C) 89 22 (!) 100/56 93 % 21 0321   18 125/70   
21 0110 98.6 °F (37 °C) 77 18 99/60 91 % 21 0102     90 % 21 2042 98.6 °F (37 °C) 88 20 105/70 91 % 21 1812   21 121/70 92 % 21 1700   19 106/70 95 % Intake/Output Summary (Last 24 hours) at 2021 1625 Last data filed at 2021 6751 Gross per 24 hour Intake  Output 750 ml Net -750 ml PHYSICAL EXAM: 
General: Patient appears comfortable and is speaking clearly EENT:  EOMI. Anicteric sclerae. MMM Resp:  Decreased air entry bilaterally + bilateral bibasilar crackle CV:  Regular  rhythm, S1 plus S2, no murmurs rubs or gallops  No edema GI:  Soft, Non distended, Non tender. +Bowel sounds Neurologic:  Alert and oriented X 3, normal speech, Psych:   [place] insight. Not anxious nor agitated Skin:  No rashes. No jaundice Procedures: see electronic medical records for all procedures/Xrays and details which were not copied into this note but were reviewed prior to creation of Plan. LABS: 
I reviewed today's most current labs and imaging studies. Pertinent labs include: 
Recent Labs 04/05/21 
0623 WBC 5.3 HGB 11.5 HCT 35.1  Recent Labs 04/05/21 
1872   
K 5.4*  
 CO2 27 * BUN 25* CREA 1.07* CA 8.3* ALB 2.4* TBILI 0.2 ALT 25 Signed: Margaret Montague MD 
 
Recent Results (from the past 24 hour(s)) GLUCOSE, POC Collection Time: 04/12/21  7:45 PM  
Result Value Ref Range Glucose (POC) 201 (H) 65 - 100 mg/dL Performed by Tiffany 69 METABOLIC PANEL, COMPREHENSIVE Collection Time: 04/13/21  8:33 AM  
Result Value Ref Range Sodium 136 136 - 145 mmol/L Potassium 3.7 3.5 - 5.1 mmol/L Chloride 101 97 - 108 mmol/L  
 CO2 27 21 - 32 mmol/L Anion gap 8 5 - 15 mmol/L Glucose 98 65 - 100 mg/dL BUN 36 (H) 6 - 20 mg/dL Creatinine 0.96 0.55 - 1.02 mg/dL BUN/Creatinine ratio 38 (H) 12 - 20 GFR est AA >60 >60 ml/min/1.73m2 GFR est non-AA 57 (L) >60 ml/min/1.73m2 Calcium 8.9 8.5 - 10.1 mg/dL Bilirubin, total 0.5 0.2 - 1.0 mg/dL AST (SGOT) 36 15 - 37 U/L  
 ALT (SGPT) 34 12 - 78 U/L Alk. phosphatase 95 45 - 117 U/L Protein, total 5.8 (L) 6.4 - 8.2 g/dL Albumin 2.6 (L) 3.5 - 5.0 g/dL Globulin 3.2 2.0 - 4.0 g/dL A-G Ratio 0.8 (L) 1.1 - 2.2    
CBC W/O DIFF Collection Time: 04/13/21  8:33 AM  
Result Value Ref Range WBC 13.6 (H) 3.6 - 11.0 K/uL  
 RBC 4.41 3.80 - 5.20 M/uL  
 HGB 12.6 11.5 - 16.0 g/dL HCT 38.1 35.0 - 47.0 % MCV 86.4 80.0 - 99.0 FL  
 MCH 28.6 26.0 - 34.0 PG  
 MCHC 33.1 30.0 - 36.5 g/dL  
 RDW 16.5 (H) 11.5 - 14.5 % PLATELET 841 610 - 613 K/uL  MPV 10.5 8.9 - 12.9 FL  
GLUCOSE, POC  
 Collection Time: 04/13/21  9:02 AM  
Result Value Ref Range Glucose (POC) 114 (H) 65 - 100 mg/dL Performed by Saadia Barrera GLUCOSE, POC Collection Time: 04/13/21 11:32 AM  
Result Value Ref Range Glucose (POC) 155 (H) 65 - 100 mg/dL Performed by Saadia Barrera GLUCOSE, POC Collection Time: 04/13/21  4:16 PM  
Result Value Ref Range Glucose (POC) 152 (H) 65 - 100 mg/dL Performed by Safia Bello X-ray chest:The cardiac silhouette is stable. Lung volumes remain low. Opacities 
in lungs most pronounced at the bases are not significantly changed. No 
hydrostatic edema, pleural effusion or pneumothorax is identified. The osseous 
structures are stable. Reviewed most current lab test results and cultures  YES Reviewed most current radiology test results   YES Review and summation of old records today    NO Reviewed patient's current orders and MAR    YES 
PMH/SH reviewed - no change compared to H&P Assessment / Plan: 
Acute respiratory failure with hypoxia secondary to COVID-19 pna: -patient presented with acute respiratory failure with hypoxia requiring high flow nasal cannula for ventilatory support and based on patient's clinical presentation secondary to COVID-19 pneumonia, patient does not meet sepsis criteria at this time Completed Redemsivir Continue Decadron 6 mg IV every 8 Continue azithromycin once daily Continue to monitor respiratory status Pulmonology consult appreciated, Infectious disease consult appreciated Hypertension 
continue home antihypertensive medications Hypothyroidism 
continue Synthroid Gastroesophageal reflux disease 
continue Protonix once daily Hyperglycemia due to type 2 diabetes  
likely iatrogenic/steroids, currently controlled 
continue lispro tid according to 24 hour coverage Continue insulin sliding scale Depression/anxietycontinue home Zoloft ProphylaxisLovenox FENcardiac diet, replete potassium and magnesium Full code, POA is daughter Charolette Heimlich [phone] Dispositionpending course, remains on hfnc. 
 
 
25.0 - 29.9 Overweight / Body mass index is 35.28 kg/m². 
 
  
 
________________________________________________________________________ Care Plan discussed with: 
  Comments Patient X Family RN X   
Care Manager Consultant  X Multidiciplinary team rounds were held today with , nursing, pharmacist and clinical coordinator. Patient's plan of care was discussed; medications were reviewed and discharge planning was addressed. ________________________________________________________________________ Comments >50% of visit spent in counseling and coordination of care X   
________________________________________________________________________ Elton Sanchez MD

## 2021-04-13 NOTE — PROGRESS NOTES
Problem: Risk for Spread of Infection Goal: Prevent transmission of infectious organism to others Description: Prevent the transmission of infectious organisms to other patients, staff members, and visitors. Outcome: Progressing Towards Goal 
  
Problem: Falls - Risk of 
Goal: *Absence of Falls Description: Document [place] Fall Risk and appropriate interventions in the flowsheet. Outcome: Progressing Towards Goal 
Note: Fall Risk Interventions: 
Mobility Interventions: Bed/chair exit alarm, Communicate number of staff needed for ambulation/transfer Mentation Interventions: Adequate sleep, hydration, pain control, Bed/chair exit alarm, Evaluate medications/consider consulting pharmacy, More frequent rounding, Reorient patient, Room close to nurse's station Medication Interventions: Bed/chair exit alarm, Evaluate medications/consider consulting pharmacy Elimination Interventions: Bed/chair exit alarm, Call light in reach, Toileting schedule/hourly rounds History of Falls Interventions: Bed/chair exit alarm, Evaluate medications/consider consulting pharmacy, Room close to nurse's station

## 2021-04-13 NOTE — PROGRESS NOTES
Pulmonary and Critical Care Progress Note Subjective:  
 
Patient seen and examined Overnight events noted Transferred out of ICU yesterday Awake and alert No acute distress Remains on high flow This morning on 60 L 85% FiO2 Chest x-ray continues to show bilateral patchy infiltrates with hypoinflation Review of Systems: A comprehensive review of systems was negative except for that written in the HPI. Current Facility-Administered Medications Medication Dose Route Frequency Provider Last Rate Last Admin  QUEtiapine (SEROquel) tablet 12.5 mg  12.5 mg Oral QHS Winnie Marinelli MD   12.5 mg at 04/12/21 2039  
 benzocaine-menthoL (CEPACOL) lozenge 1 Lozenge  1 Lozenge Mucous Membrane PRN Zoya Roberson MD   1 Lozenge at 04/09/21 1640  nystatin (MYCOSTATIN) 100,000 unit/mL oral suspension 500,000 Units  500,000 Units Oral QID Zoya Roberson MD   500,000 Units at 04/13/21 0947  
 insulin lispro (HUMALOG) injection 3 Units  3 Units SubCUTAneous Keara Castellanos MD   3 Units at 04/13/21 1156  dextrose (D50W) injection syrg 12.5-25 g  25-50 mL IntraVENous PRN Zoya Roberson MD      
 insulin lispro (HUMALOG) injection   SubCUTAneous AC&HS Zoya Roberson MD   2 Units at 04/13/21 1156  atorvastatin (LIPITOR) tablet 10 mg  10 mg Oral DAILY Mellissa Ribeiro MD   10 mg at 04/13/21 0946  
 imipramine (TOFRANIL) tablet 50 mg  50 mg Oral QHS Mellissa Ribeiro MD   50 mg at 04/12/21 2039  levothyroxine (SYNTHROID) tablet 88 mcg  88 mcg Oral ACB Mlelissa Ribeiro MD   88 mcg at 04/13/21 0539  
 lisinopril-hydroCHLOROthiazide (PRINZIDE, ZESTORETIC) 10-12.5 mg per tablet 1 Tab  1 Tab Oral DAILY Mellissa Ribeiro MD   1 Tab at 04/13/21 0900  
 meloxicam (MOBIC) tablet 7.5 mg  7.5 mg Oral DAILY Mellissa Ribeiro MD   7.5 mg at 04/13/21 0946  
 pantoprazole (PROTONIX) tablet 20 mg  20 mg Oral DAILY Andi Vitaliy Salmon MD   20 mg at 21 6607  sertraline (ZOLOFT) tablet 100 mg  100 mg Oral DAILY Rd Francis MD   100 mg at 21 8871  cyclobenzaprine (FLEXERIL) tablet 10 mg  10 mg Oral TID Rd Francis MD   10 mg at 21 0946  
 glucose chewable tablet 16 g  4 Tab Oral PRN Rd Francis MD      
 dextrose (D50W) injection syrg 12.5-25 g  25-50 mL IntraVENous PRN Rd Francis MD      
 glucagon [facility]) injection 1 mg  1 mg IntraMUSCular PRN Rd Francis MD      
 zinc sulfate (ZINCATE) 50 mg zinc (220 mg) capsule 1 Cap  1 Cap Oral DAILY Rd Francis MD   1 Cap at 21 0762  enoxaparin (LOVENOX) injection 40 mg  40 mg SubCUTAneous Q24H Rd Francis MD   40 mg at 21 1710  acetaminophen (TYLENOL) tablet 650 mg  650 mg Oral Q6H PRN Rd Francis MD   650 mg at 21 2148  melatonin tablet 5 mg  5 mg Oral QHS PRN Rd Francis MD   5 mg at 21 2142  ondansetron (ZOFRAN) injection 4 mg  4 mg IntraVENous Q4H PRN Rd Francis MD   4 mg at 21 2352  dexamethasone (DECADRON) 4 mg/mL injection 6 mg  6 mg IntraVENous Q8H Chelsea Fajardo MD   6 mg at 21 8624 No Known Allergies Objective:  
 
Blood pressure (!) 100/56, pulse 89, temperature 99.5 °F (37.5 °C), resp. rate 22, height 5' 4.5\" (1.638 m), weight 94.7 kg (208 lb 12.4 oz), SpO2 97 %, not currently breastfeeding. Temp (24hrs), Av.7 °F (37.1 °C), Min:98.2 °F (36.8 °C), Max:99.5 °F (37.5 °C) Intake and Output: 
Current Shift: No intake/output data recorded. Last 3 Shifts:  1901 -  0700 In: -  
Out: 3285 [[facility]:[id number]] Physical Exam:  
 
General: Lying in bed comfortably, no acute distress, on high flow nasal cannula. Eye: Reactive, symmetric Throat and Neck: Supple Lung: Reduced air entry bilaterally with prolonged exhalation but no wheezing. Occasional crackles. Currently on high flow nasal cannula.  
Heart: S1+S2. No murmurs Abdomen: soft, non-tender. Bowel sounds normal. No masses; obese Extremities: No edema : Not done Skin: No cyanosis Neurologic: A & O x3. Grossly nonfocal 
Psychiatric: Appropriate affect; coherent Lab/Data Review: 
 
Recent Results (from the past 24 hour(s)) GLUCOSE, POC Collection Time: 04/12/21  3:16 PM  
Result Value Ref Range Glucose (POC) 119 (H) 65 - 100 mg/dL Performed by Marilin Carroll   
ECHO ADULT COMPLETE Collection Time: 04/12/21  5:12 PM  
Result Value Ref Range Pulmonic Regurgitant End Max Velocity 109.00 cm/s AoV PG 5.00 mmHg Ao Root D 2.90 cm IVSd 1.35 (A) 0.60 - 0.90 cm LVIDd 3.34 (A) 3.90 - 5.30 cm LVIDs 2.20 cm Pulmonic Regurgitant End Max Velocity 82.10 cm/s LVOT Peak Gradient 3.00 mmHg LVPWd 1.33 (A) 0.60 - 0.90 cm LV ED Vol A2C 37.30 cm3 BP EF 64.3 55.0 - 100.0 % LV ES Vol A2C 10.60 cm3 Left Atrium Major Axis 3.40 cm Left Atrium to Aortic Root Ratio 1.17 Mitral Valve Deceleration [place] 2,360.00 mm/s2 Mitral Valve Deceleration [place] 2,360.00 mm/s2 Mitral Valve E Wave Deceleration Time 169.00 ms Mitral Valve Pressure Half-time 77.00 ms MV A [place] 77.70 cm/s  
 MV E [place] 54.30 cm/s  
 MVA (PHT) 2.86 cm2  
 MV E/A 0.70 Pulmonic Regurgitant End Max Velocity 121.00 cm/s Pulmonic Valve Systolic Peak Instantaneous Gradient 6.00 mmHg RVIDd 2.31 cm Tricuspid Valve Max Velocity 172.00 cm/s Triscuspid Valve Regurgitation Peak Gradient 12.00 mmHg LV Mass .1 67.0 - 162.0 g  
 LV Mass AL Index 75.4 43.0 - 95.0 g/m2 Left Atrium Minor Axis 1.70 cm GLUCOSE, POC Collection Time: 04/12/21  7:45 PM  
Result Value Ref Range Glucose (POC) 201 (H) 65 - 100 mg/dL Performed by Tiffany 69 METABOLIC PANEL, COMPREHENSIVE Collection Time: 04/13/21  8:33 AM  
Result Value Ref Range Sodium 136 136 - 145 mmol/L Potassium 3.7 3.5 - 5.1 mmol/L  Chloride 101 97 - 108 mmol/L  
 CO2 27 21 - 32 mmol/L Anion gap 8 5 - 15 mmol/L Glucose 98 65 - 100 mg/dL BUN 36 (H) 6 - 20 mg/dL Creatinine 0.96 0.55 - 1.02 mg/dL BUN/Creatinine ratio 38 (H) 12 - 20 GFR est AA >60 >60 ml/min/1.73m2 GFR est non-AA 57 (L) >60 ml/min/1.73m2 Calcium 8.9 8.5 - 10.1 mg/dL Bilirubin, total 0.5 0.2 - 1.0 mg/dL AST (SGOT) 36 15 - 37 U/L  
 ALT (SGPT) 34 12 - 78 U/L Alk. phosphatase 95 45 - 117 U/L Protein, total 5.8 (L) 6.4 - 8.2 g/dL Albumin 2.6 (L) 3.5 - 5.0 g/dL Globulin 3.2 2.0 - 4.0 g/dL A-G Ratio 0.8 (L) 1.1 - 2.2    
CBC W/O DIFF Collection Time: 04/13/21  8:33 AM  
Result Value Ref Range WBC 13.6 (H) 3.6 - 11.0 K/uL  
 RBC 4.41 3.80 - 5.20 M/uL  
 HGB 12.6 11.5 - 16.0 g/dL HCT 38.1 35.0 - 47.0 % MCV 86.4 80.0 - 99.0 FL  
 MCH 28.6 26.0 - 34.0 PG  
 MCHC 33.1 30.0 - 36.5 g/dL  
 RDW 16.5 (H) 11.5 - 14.5 % PLATELET 597 546 - 732 K/uL MPV 10.5 8.9 - 12.9 FL  
GLUCOSE, POC Collection Time: 04/13/21  9:02 AM  
Result Value Ref Range Glucose (POC) 114 (H) 65 - 100 mg/dL Performed by Elinor Session GLUCOSE, POC Collection Time: 04/13/21 11:32 AM  
Result Value Ref Range Glucose (POC) 155 (H) 65 - 100 mg/dL Performed by Elinor Session XR CHEST PORT Final Result XR CHEST PORT Final Result XR CHEST PORT Final Result There is persistent airspace disease within the mid to lower lung  
zone predominance. This represents a heterogeneous pattern as might be  
associated with an atypical pneumonia. There has been little interval change. XR CHEST PORT Final Result XR CHEST PORT Final Result XR CHEST PORT Final Result No significant interval change. XR CHEST PORT Final Result CT Results  (Last 48 hours) None Assessment: 1. Acute respiratory failure with hypoxia 2. COVID-19 pneumonia 3. Shortness of breath 4.  Cough 5.   Generalized weakness 6.  Acute kidney injury 7. Obstructive sleep apnea on CPAP 
8. Obesity Plan:  
 
Currently on high flow nasal cannula 50 L,85%. Decreased to 75% Will use oxygen supplementation as needed to keep saturation above 90% Patient has acute respiratory failure with hypoxia due to COVID-19 pneumonia Status post 5 days of remdesivir Currently on Decadron 6 mg IV every 8 hours, if she improves we will reduce the frequency of this medication. Off azithromycin per ID Status post tocilizumab on 4/5/2021 Vitamin C and zinc 
D-dimer only mildly elevated 0.54, the patient does not require therapeutic anticoagulation. CRP decreasing yesterday morning, repeat inflammatory markers for the most part are getting better, ferritin currently pending. Possibly could qualify for tocilizumab dose #2 tomorrow. Chest x-ray with persistent bilateral infiltrates and hypoinflation Showed normal LVEF Renal function stable today. Monitor renal function with fluid changes Check electrolytes and replace as needed Start Lasix twice daily CPAP at night DVT and GI prophylaxis Questions of patient were answered at bedside in detail Case discussed in detail with RN, RT, and care team 
Thank you for involving me in the care of this patient I will follow with you closely during hospitalization Greater than 30 minutes were spent in direct care with this patient. Shoaib Robles MD 
[facility] 4/13/2021 
8:22 PM

## 2021-04-13 NOTE — PROGRESS NOTES
Infectious Disease Progress Note Subjective:  
Remains stable, denies new complaints, no acute events since last seen. Remains on high flow, but more alert and conversant today Objective:  
Physical Exam:  
 
Visit Vitals BP 98/63 (BP Patient Position: At rest) Pulse 93 Temp 99.5 °F (37.5 °C) Resp 22 Ht 5' 4.5\" (1.638 m) Wt 208 lb 12.4 oz (94.7 kg) SpO2 98% Breastfeeding No  
BMI 35.28 kg/m² O2 Flow Rate (L/min): 60 l/min O2 Device: Hi flow nasal cannula Temp (24hrs), Av.1 °F (37.3 °C), Min:98.6 °F (37 °C), Max:99.5 °F (37.5 °C) No intake/output data recorded.  1901 -  0700 In: -  
Out: 1622 [[id number]] General: NAD, lethargic, confused HEENT: MICHELLE, Moist mucosa, on high flow Lungs: CTA b/l, no wheeze/rhonchi Heart: S1S2+, RRR, no murmur Abdo: Soft, NT, ND, +BS Exts: No edema, + pulses b/l  
Skin: No wounds, No rashes or lesions Data Review:  
   
Recent Days: 
Recent Labs 21 
6047 21 
0405 21 
0750 WBC 13.6* 13.5* 14.2* HGB 12.6 12.0 12.8 HCT 38.1 35.2 38.7  289 324 Recent Labs 21 
1624 21 
0405 21 
0750 BUN 36* 27* 29* CREA 0.96 0.87 0.93 Lab Results Component Value Date/Time C-Reactive protein 1.69 (H) 2021 08:53 AM  
  
 
Microbiology: None Diagnostics CXR Results  (Last 48 hours) 21 0742  XR CHEST PORT Final result Narrative:  1 view comparison yesterday Continued hypoinflation with no change versus interval improvement of patchy  
mixed infiltrates. No effusion or pneumothorax. Normal heart and no congestion 21 0400  XR CHEST PORT Final result Narrative:  Chest single view. Comparison single view chest 2021 Patchy alveolar opacities through lungs mid and lower lung predominance no  
better compared to prior imaging. Cardiac and mediastinal structures unchanged.   
No pneumothorax or sizable pleural effusion. Assessment/Plan 1. COVID-19 pneumonitis, Improved patchy infiltrates on CXR (04/13) S/p 5 days of Remdesivir and Azithromycin, s/p tocilizumab x 1 dose. Remains on high flow O2 w decreasing O2 requirements On Decadron through 04/14, then will taper Continue to wean off O2 as tolerated. Routine labs in the morning Monitor off antibiotics for now 2. IZA: Resolved, Cr at baseline 3. DM: Closely monitor BS while on steroid therapy 4. H/o breast and brain Ca per records Kathryn Barker MD 
 
4/13/2021

## 2021-04-14 NOTE — PROGRESS NOTES
Patient has no access. PICC team was consulted and they were unsuccessful with a peripheral attempt and did not see anything they could attempt for a PICC placement. PICC team is recommending a central line or EJ. This Rn paged MD Amy Case, but no response or new orders have been placed yet.

## 2021-04-14 NOTE — PROGRESS NOTES
Infectious Disease Progress Note Subjective:  
Remains stable w no sig changes in clinical status. Remains on high flow O2. Remains afebrile Objective:  
Physical Exam:  
 
Visit Vitals BP (!) 98/57 Pulse 100 Temp 97.8 °F (36.6 °C) Resp 20 Ht 5' 4.5\" (1.638 m) Wt 208 lb 12.4 oz (94.7 kg) SpO2 (!) 34% Breastfeeding No  
BMI 35.28 kg/m² O2 Flow Rate (L/min): 60 l/min O2 Device: Hi flow nasal cannula Temp (24hrs), Av.2 °F (36.8 °C), Min:97.6 °F (36.4 °C), Max:98.9 °F (37.2 °C) No intake/output data recorded.  1901 -  0700 In: -  
Out: 1601 [[id number]] General: NAD, lethargic, confused HEENT: MICHELLE, Moist mucosa, on high flow Lungs: CTA b/l, no wheeze/rhonchi Heart: S1S2+, RRR, no murmur Abdo: Soft, NT, ND, +BS Exts: No edema, + pulses b/l  
Skin: No wounds, No rashes or lesions Data Review:  
   
Recent Days: 
Recent Labs 21 
4016 21 
2886 21 
0405 WBC 13.8* 13.6* 13.5* HGB 14.4 12.6 12.0 HCT 42.7 38.1 35.2  237 289 Recent Labs 21 
5918 21 
3277 21 
0405 BUN 38* 36* 27* CREA 1.13* 0.96 0.87 Lab Results Component Value Date/Time C-Reactive protein 1.69 (H) 2021 08:53 AM  
  
 
Microbiology: None Diagnostics CXR Results  (Last 48 hours) 21 0742  XR CHEST PORT Final result Narrative:  1 view comparison yesterday Continued hypoinflation with no change versus interval improvement of patchy  
mixed infiltrates. No effusion or pneumothorax. Normal heart and no congestion Assessment/Plan 1. COVID-19 pneumonitis, Improved patchy infiltrates on CXR (04/13) S/p 5 days of Remdesivir and Azithromycin, s/p tocilizumab x 1 dose. Remains on high flow O2, denies productive cough On decadron, f/u CXR ordered for AM  
    Routine labs in the morning 2. IZA: mild rise in Cr on todays labs 3.  DM: Closely monitor BS while on steroid therapy 4. Generalized weakness: Being followed by PT Ilda Kelly MD 
 
2021

## 2021-04-14 NOTE — PROGRESS NOTES
Problem: Pressure Injury - Risk of 
Goal: *Prevention of pressure injury Description: Document Arsh Scale and appropriate interventions in the flowsheet. Outcome: Progressing Towards Goal 
Note: Pressure Injury Interventions: 
Sensory Interventions: Assess changes in LOC Moisture Interventions: Absorbent underpads Activity Interventions: PT/OT evaluation, Increase time out of bed Mobility Interventions: HOB 30 degrees or less Nutrition Interventions: Offer support with meals,snacks and hydration, Document food/fluid/supplement intake Friction and Shear Interventions: HOB 30 degrees or less Problem: Patient Education: Go to Patient Education Activity Goal: Patient/Family Education Outcome: Progressing Towards Goal

## 2021-04-14 NOTE — PROGRESS NOTES
Hospitalist Progress Note NAME: Britni Chauhan :  1945 MRN:  [id number]  
 
76f acute hypoxic respiratory failure s/t Covid-19 pneumonia. Subjective:  
 
 
Patient seen and evaluated at bedside, Remains on hfnc. States no change today. Hypotensive this am, responded to fluid bolus but iv infiltrated, awaiting picc. Review of Systems: 
Symptom Y/N Comments  Symptom Y/N Comments Fever/Chills N   Chest Pain N Poor Appetite Y   Edema N   
Cough Y   Abdominal Pain N Sputum Y   Joint Pain N   
SOB/MADERA Y   Pruritis/Rash N   
Nausea/vomit N   Tolerating PT/OT NA Diarrhea N   Tolerating Diet Y Constipation N   Other Could NOT obtain due to:   
Patient denies any fevers chills nausea vomiting lightheadedness dizziness chest pain palpitations headache focal weakness loss sensation auditory or visual symptoms abdominal stool or urinary complaints or any other associated symptoms Objective: VITALS:  
Last 24hrs VS reviewed since prior progress note. Most recent are: 
Patient Vitals for the past 24 hrs: 
 Temp Pulse Resp BP SpO2  
21 1126    (!) 76/51   
21 1110    (!) 71/37   
21 1054 97.6 °F (36.4 °C) (!) 110 18 (!) 77/56 95 % 21 0705     92 % 21 0700     94 % 21 0245 98.9 °F (37.2 °C) 91 18 115/80 95 % 21 2125 98.6 °F (37 °C) 97 20 110/75 95 % 21 2000  95  110/61   
21 1417 99.5 °F (37.5 °C) 93 22 98/63 98 % Intake/Output Summary (Last 24 hours) at 2021 1334 Last data filed at 2021 0000 Gross per 24 hour Intake  Output 1600 ml Net -1600 ml PHYSICAL EXAM: 
General: Patient appears comfortable and is speaking clearly EENT:  EOMI. Anicteric sclerae. MMM Resp:  Decreased air entry bilaterally + bilateral bibasilar crackles CV:  Regular  rhythm, S1 plus S2, no murmurs rubs or gallops  No edema GI:  Soft, Non distended, Non tender. +Bowel sounds Neurologic:  Alert and oriented X 3, normal speech, Psych:   Good insight. Not anxious nor agitated Skin:  No rashes. No jaundice. IV infiltrated rt forearm. Procedures: see electronic medical records for all procedures/Xrays and details which were not copied into this note but were reviewed prior to creation of Plan. LABS: 
I reviewed today's most current labs and imaging studies. Pertinent labs include: 
Recent Labs 04/05/21 
9227 WBC 5.3 HGB 11.5 HCT 35.1  Recent Labs 04/05/21 
8052   
K 5.4*  
 CO2 27 * BUN 25* CREA 1.07* CA 8.3* ALB 2.4* TBILI 0.2 ALT 25 Signed: Heather Bird MD 
 
Recent Results (from the past 24 hour(s)) GLUCOSE, POC Collection Time: 04/13/21  4:16 PM  
Result Value Ref Range Glucose (POC) 152 (H) 65 - 100 mg/dL Performed by JOCELINE MAXWELL   
GLUCOSE, POC Collection Time: 04/13/21  7:37 PM  
Result Value Ref Range Glucose (POC) 152 (H) 65 - 100 mg/dL Performed by [facility] METABOLIC PANEL, COMPREHENSIVE Collection Time: 04/14/21  8:50 AM  
Result Value Ref Range Sodium 134 (L) 136 - 145 mmol/L Potassium 3.5 3.5 - 5.1 mmol/L Chloride 96 (L) 97 - 108 mmol/L  
 CO2 28 21 - 32 mmol/L Anion gap 10 5 - 15 mmol/L Glucose 76 65 - 100 mg/dL BUN 38 (H) 6 - 20 mg/dL Creatinine 1.13 (H) 0.55 - 1.02 mg/dL BUN/Creatinine ratio 34 (H) 12 - 20 GFR est AA 57 (L) >60 ml/min/1.73m2 GFR est non-AA 47 (L) >60 ml/min/1.73m2 Calcium 9.4 8.5 - 10.1 mg/dL Bilirubin, total 0.7 0.2 - 1.0 mg/dL AST (SGOT) 42 (H) 15 - 37 U/L  
 ALT (SGPT) 39 12 - 78 U/L Alk. phosphatase 98 45 - 117 U/L Protein, total 6.7 6.4 - 8.2 g/dL Albumin 3.0 (L) 3.5 - 5.0 g/dL Globulin 3.7 2.0 - 4.0 g/dL A-G Ratio 0.8 (L) 1.1 - 2.2    
CBC W/O DIFF Collection Time: 04/14/21  8:50 AM  
Result Value Ref Range  WBC 13.8 (H) 3.6 - 11.0 K/uL  
 RBC 4.94 3.80 - 5.20 M/uL  
 HGB 14.4 11.5 - 16.0 g/dL HCT 42.7 35.0 - 47.0 % MCV 86.4 80.0 - 99.0 FL  
 MCH 29.1 26.0 - 34.0 PG  
 MCHC 33.7 30.0 - 36.5 g/dL  
 RDW 16.5 (H) 11.5 - 14.5 % PLATELET 257 597 - 169 K/uL MPV 10.8 8.9 - 12.9 FL  
D DIMER Collection Time: 04/14/21  8:50 AM  
Result Value Ref Range D DIMER 2.81 (H) <0.50 ug/ml(FEU) GLUCOSE, POC Collection Time: 04/14/21  9:23 AM  
Result Value Ref Range Glucose (POC) 218 (H) 65 - 100 mg/dL Performed by [facility]) GLUCOSE, POC Collection Time: 04/14/21 11:36 AM  
Result Value Ref Range Glucose (POC) 99 65 - 100 mg/dL Performed by Meriel Runner X-ray chest:The cardiac silhouette is stable. Lung volumes remain low. Opacities 
in lungs most pronounced at the bases are not significantly changed. No 
hydrostatic edema, pleural effusion or pneumothorax is identified. The osseous 
structures are stable. Reviewed most current lab test results and cultures  YES Reviewed most current radiology test results   YES Review and summation of old records today    NO Reviewed patient's current orders and MAR    YES 
PMH/SH reviewed - no change compared to H&P Assessment / Plan: 
Acute respiratory failure with hypoxia secondary to COVID-19 pna: -patient presented with acute respiratory failure with hypoxia requiring high flow nasal cannula for ventilatory support and based on patient's clinical presentation secondary to COVID-19 pneumonia, patient does not meet sepsis criteria at this time Completed Redemsivir Continue Decadron 6 mg IV every 8 Continue azithromycin once daily Continue to monitor respiratory status Pulmonology consult appreciated, Infectious disease consult appreciated Hypertension hx Profoundly hypotensive am 4/14-  
bp rx and lasix held Judiciously hydrate. Iv infiltrated Rt forearm- elevate/prn warm compress. Hypothyroidism 
continue Synthroid Gastroesophageal reflux disease 
continue Protonix once daily Hyperglycemia due to type 2 diabetes  
likely iatrogenic/steroids, currently controlled 
continue lispro tid according to 24 hour coverage Continue insulin sliding scale Depression/anxietycontinue home Zoloft ProphylaxisLovenox FENcardiac diet, replete potassium and magnesium Full code, POA is daughter Linda Jones [phone] Dispositionpending course, remains on hfnc. 
 
 
25.0 - 29.9 Overweight / Body mass index is 35.28 kg/m². 
 
  
 
________________________________________________________________________ Care Plan discussed with: 
  Comments Patient X Family RN X   
Care Manager x Consultant  X   
                 x Multidiciplinary team rounds were held today with , nursing, pharmacist and clinical coordinator. Patient's plan of care was discussed; medications were reviewed and discharge planning was addressed. ________________________________________________________________________ Comments >50% of visit spent in counseling and coordination of care X   
________________________________________________________________________ Noreen Nguyễn MD

## 2021-04-14 NOTE — PROGRESS NOTES
Patient had a blood pressure of 71/37 at 1110. This Rn rechecked blood pressure again at it was 76/21 at 1126. MD Shana Daugherty was paged and a 500 ml bolus was started. Patient's IV infiltrated after 240ml had gone through IV. This Rn and charge Rn attempted to place and IV and were unsuccessful. PICC team was consulted to gain acces to complete bolus.

## 2021-04-14 NOTE — PROGRESS NOTES
Pulmonary and Critical Care Progress Note Subjective:  
 
Patient seen and examined Overnight events noted Transferred out of ICU yesterday Awake and alert No acute distress Remains on high flow This morning on 60 L 55% FiO2 Chest x-ray continues to show bilateral patchy infiltrates with hypoinflation Review of Systems: A comprehensive review of systems was negative except for that written in the HPI. Current Facility-Administered Medications Medication Dose Route Frequency Provider Last Rate Last Admin  
 0.9% sodium chloride with KCl 20 mEq/L infusion   IntraVENous CONTINUOUS Khurram Steward MD      
 QUEtiapine (SEROquel) tablet 12.5 mg  12.5 mg Oral QHS Wendy Patino MD   12.5 mg at 04/13/21 2121  
 benzocaine-menthoL (CEPACOL) lozenge 1 Lozenge  1 Lozenge Mucous Membrane PRN Wendy Patino MD   1 Lozenge at 04/09/21 1640  nystatin (MYCOSTATIN) 100,000 unit/mL oral suspension 500,000 Units  500,000 Units Oral QID Wendy Patino MD   500,000 Units at 04/14/21 1036  
 insulin lispro (HUMALOG) injection 3 Units  3 Units SubCUTAneous Tariq Gomes MD   Stopped at 04/14/21 1130  
 dextrose (D50W) injection syrg 12.5-25 g  25-50 mL IntraVENous PRN Wendy Patino MD      
 insulin lispro (HUMALOG) injection   SubCUTAneous AC&HS Wendy Patino MD   Stopped at 04/14/21 1130  
 atorvastatin (LIPITOR) tablet 10 mg  10 mg Oral DAILY Austen Correia MD   10 mg at 04/14/21 1037  
 imipramine (TOFRANIL) tablet 50 mg  50 mg Oral QHS Austen Correia MD   50 mg at 04/13/21 2120  levothyroxine (SYNTHROID) tablet 88 mcg  88 mcg Oral ACB Austen Correia MD   88 mcg at 04/14/21 7642  [Held by provider] lisinopril-hydroCHLOROthiazide (PRINZIDE, ZESTORETIC) 10-12.5 mg per tablet 1 Tab  1 Tab Oral DAILY Austen Correia MD   1 Tab at 04/14/21 0900  
 meloxicam (MOBIC) tablet 7.5 mg  7.5 mg Oral DAILY Desirae Oconnell MD   7.5 mg at 21 1037  pantoprazole (PROTONIX) tablet 20 mg  20 mg Oral DAILY Desirae Oconnell MD   20 mg at 21 1037  sertraline (ZOLOFT) tablet 100 mg  100 mg Oral DAILY Desirae Oconnell MD   100 mg at 21 1037  cyclobenzaprine (FLEXERIL) tablet 10 mg  10 mg Oral TID Desirae Oconnell MD   10 mg at 21 1036  
 glucose chewable tablet 16 g  4 Tab Oral PRN Desirae Oconnell MD      
 dextrose (D50W) injection syrg 12.5-25 g  25-50 mL IntraVENous PRN Desirae Oconnell MD      
 glucagon [facility]) injection 1 mg  1 mg IntraMUSCular PRN Desirae Oconnell MD      
 zinc sulfate (ZINCATE) 50 mg zinc (220 mg) capsule 1 Cap  1 Cap Oral DAILY Desirae Oconnell MD   1 Cap at 21 1036  enoxaparin (LOVENOX) injection 40 mg  40 mg SubCUTAneous Q24H Desirae Oconnell MD   40 mg at 21 1701  acetaminophen (TYLENOL) tablet 650 mg  650 mg Oral Q6H PRN Desirae Oconnell MD   650 mg at 21 1036  
 melatonin tablet 5 mg  5 mg Oral QHS PRN Desirae Oconnell MD   5 mg at 21 2142  ondansetron (ZOFRAN) injection 4 mg  4 mg IntraVENous Q4H PRN Desirae Oconnell MD   4 mg at 21 2352  dexamethasone (DECADRON) 4 mg/mL injection 6 mg  6 mg IntraVENous Q8H Chelsea Fajardo MD   6 mg at 21 8096 No Known Allergies Objective:  
 
Blood pressure (!) 76/51, pulse (!) 110, temperature 97.6 °F (36.4 °C), resp. rate 18, height 5' 4.5\" (1.638 m), weight 94.7 kg (208 lb 12.4 oz), SpO2 95 %, not currently breastfeeding. Temp (24hrs), Av.7 °F (37.1 °C), Min:97.6 °F (36.4 °C), Max:99.5 °F (37.5 °C) Intake and Output: 
Current Shift: No intake/output data recorded. Last 3 Shifts:  190 -  0700 In: -  
Out: 8464 [[id number]] Physical Exam:  
 
General: Lying in bed comfortably, no acute distress, on high flow nasal cannula. Eye: Reactive, symmetric Throat and Neck: Supple Lung: Reduced air entry bilaterally with prolonged exhalation but no wheezing. Occasional crackles. Currently on high flow nasal cannula. Heart: S1+S2. No murmurs Abdomen: soft, non-tender. Bowel sounds normal. No masses; obese Extremities: No edema : Not done Skin: No cyanosis Neurologic: A & O x3. Grossly nonfocal 
Psychiatric: Appropriate affect; coherent Lab/Data Review: 
 
Recent Results (from the past 24 hour(s)) GLUCOSE, POC Collection Time: 04/13/21  4:16 PM  
Result Value Ref Range Glucose (POC) 152 (H) 65 - 100 mg/dL Performed by JOCELINE MAXWELL   
GLUCOSE, POC Collection Time: 04/13/21  7:37 PM  
Result Value Ref Range Glucose (POC) 152 (H) 65 - 100 mg/dL Performed by Zaheer Marin METABOLIC PANEL, COMPREHENSIVE Collection Time: 04/14/21  8:50 AM  
Result Value Ref Range Sodium 134 (L) 136 - 145 mmol/L Potassium 3.5 3.5 - 5.1 mmol/L Chloride 96 (L) 97 - 108 mmol/L  
 CO2 28 21 - 32 mmol/L Anion gap 10 5 - 15 mmol/L Glucose 76 65 - 100 mg/dL BUN 38 (H) 6 - 20 mg/dL Creatinine 1.13 (H) 0.55 - 1.02 mg/dL BUN/Creatinine ratio 34 (H) 12 - 20 GFR est AA 57 (L) >60 ml/min/1.73m2 GFR est non-AA 47 (L) >60 ml/min/1.73m2 Calcium 9.4 8.5 - 10.1 mg/dL Bilirubin, total 0.7 0.2 - 1.0 mg/dL AST (SGOT) 42 (H) 15 - 37 U/L  
 ALT (SGPT) 39 12 - 78 U/L Alk. phosphatase 98 45 - 117 U/L Protein, total 6.7 6.4 - 8.2 g/dL Albumin 3.0 (L) 3.5 - 5.0 g/dL Globulin 3.7 2.0 - 4.0 g/dL A-G Ratio 0.8 (L) 1.1 - 2.2    
CBC W/O DIFF Collection Time: 04/14/21  8:50 AM  
Result Value Ref Range WBC 13.8 (H) 3.6 - 11.0 K/uL  
 RBC 4.94 3.80 - 5.20 M/uL  
 HGB 14.4 11.5 - 16.0 g/dL HCT 42.7 35.0 - 47.0 % MCV 86.4 80.0 - 99.0 FL  
 MCH 29.1 26.0 - 34.0 PG  
 MCHC 33.7 30.0 - 36.5 g/dL  
 RDW 16.5 (H) 11.5 - 14.5 % PLATELET 627 782 - 636 K/uL MPV 10.8 8.9 - 12.9 FL  
GLUCOSE, POC Collection Time: 04/14/21  9:23 AM  
Result Value Ref Range Glucose (POC) 218 (H) 65 - 100 mg/dL Performed by Lindsey CAMACHO [place]) GLUCOSE, POC Collection Time: 04/14/21 11:36 AM  
Result Value Ref Range Glucose (POC) 99 65 - 100 mg/dL Performed by Danika Lagos XR CHEST PORT Final Result XR CHEST PORT Final Result XR CHEST PORT Final Result There is persistent airspace disease within the mid to lower lung  
zone predominance. This represents a heterogeneous pattern as might be  
associated with an atypical pneumonia. There has been little interval change. XR CHEST PORT Final Result XR CHEST PORT Final Result XR CHEST PORT Final Result No significant interval change. XR CHEST PORT Final Result CT Results  (Last 48 hours) None Assessment: 1. Acute respiratory failure with hypoxia 2. COVID-19 pneumonia 3. Shortness of breath 4.  Cough 5. Generalized weakness 6. Acute kidney injury 7. Obstructive sleep apnea on CPAP 
8. Obesity Plan:  
 
Currently on high flow nasal cannula 50 L,85%. Decreased to 75% Will use oxygen supplementation as needed to keep saturation above 90% Patient has acute respiratory failure with hypoxia due to COVID-19 pneumonia Status post 5 days of remdesivir Currently on Decadron 6 mg IV every 8 hours, if she improves we will reduce the frequency of this medication. Off azithromycin per ID Status post tocilizumab on 4/5/2021 Vitamin C and zinc 
D-dimer only mildly elevated 0.54 We will repeat D-dimer today to determine need for therapeutic anticoagulation CRP decreasing yesterday morning, repeat inflammatory markers for the most part are getting better, ferritin currently pending. Possibly could qualify for tocilizumab dose #2 tomorrow. Chest x-ray with persistent bilateral infiltrates and hypoinflation Showed normal LVEF Renal function stable today. Monitor renal function with fluid changes Check electrolytes and replace as needed Patient placed on Lasix yesterday with improvement in oxygenation however more hypotensive this morning DC Lasix CPAP at night DVT and GI prophylaxis Questions of patient were answered at bedside in detail Case discussed in detail with RN, RT, and care team 
Thank you for involving me in the care of this patient I will follow with you closely during hospitalization Greater than 30 minutes were spent in direct care with this patient. Mu Woods MD 
[facility] 4/14/2021 
8:22 PM

## 2021-04-15 NOTE — PROGRESS NOTES
Problem: Pressure Injury - Risk of 
Goal: *Prevention of pressure injury Description: Document Arsh Scale and appropriate interventions in the flowsheet. Outcome: Resolved/Met Note: Pressure Injury Interventions: 
Sensory Interventions: Assess changes in LOC Moisture Interventions: Absorbent underpads Activity Interventions: PT/OT evaluation, Increase time out of bed Mobility Interventions: HOB 30 degrees or less Nutrition Interventions: Offer support with meals,snacks and hydration, Document food/fluid/supplement intake Friction and Shear Interventions: HOB 30 degrees or less

## 2021-04-15 NOTE — INTERDISCIPLINARY ROUNDS
Two person skin assessment performed by Dede Berg RN and Otf Valdez RN. Patient has excoriation to her sacrum, abdominal folds, groin, and bilateral breasts. No open places noted on skin at this time.

## 2021-04-15 NOTE — PROGRESS NOTES
Attempts by night RN to gain IV access unsuccessful X2. Pt BP stable at this time, will continue to monitor and follow.

## 2021-04-15 NOTE — PROGRESS NOTES
Infectious Disease Progress Note Subjective:  
Remains stable, intermittent episodes of agitation, still requiring significant amounts of oxygen, remains on high flow Objective:  
Physical Exam:  
 
Visit Vitals BP (!) 102/59 (BP 1 Location: Left upper arm, BP Patient Position: At rest) Pulse 97 Temp 98 °F (36.7 °C) Resp 22 Ht 5' 4.5\" (1.638 m) Wt 208 lb 12.4 oz (94.7 kg) SpO2 91% Breastfeeding No  
BMI 35.28 kg/m² O2 Flow Rate (L/min): 60 l/min O2 Device: Hi flow nasal cannula Temp (24hrs), Av.5 °F (36.9 °C), Min:98 °F (36.7 °C), Max:98.7 °F (37.1 °C) No intake/output data recorded.  1901 - 04/15 0700 In: 240 [P.O.:240] Out: 1800 [Urine:1800] General: NAD, lethargic, confused HEENT: MICHELLE, Moist mucosa, on high flow Lungs: CTA b/l, no wheeze/rhonchi Heart: S1S2+, RRR, no murmur Abdo: Soft, NT, ND, +BS Exts: No edema, + pulses b/l  
Skin: No wounds, No rashes or lesions Data Review:  
   
Recent Days: 
Recent Labs 04/15/21 
5556 21 
8671 21 
5068 WBC 11.8* 13.8* 13.6* HGB 13.4 14.4 12.6 HCT 40.1 42.7 38.1  250 237 Recent Labs 04/15/21 
0480 21 
7747 21 
2191 BUN 46* 38* 36* CREA 1.55* 1.13* 0.96 Lab Results Component Value Date/Time C-Reactive protein 1.69 (H) 2021 08:53 AM  
  
Microbiology: None Diagnostics CXR Results  (Last 48 hours) 04/15/21 0857  XR CHEST PORT Final result Narrative:  Chest single view. Comparison single view chest 2021. Patchy alveolar opacities throughout the lungs persist. Cardiac and mediastinal  
structures unchanged. No pneumothorax or sizable pleural effusion. Assessment/Plan 1. COVID-19 pneumonitis, persistent bilateral patchy infiltrates on CXR (04/15) S/p 5 days of Remdesivir and Azithromycin, s/p tocilizumab x 1 dose.  
    AFebrile, leukocytosis trending down on today's labs No strong indication for antimicrobial therapy, off steroid therapy Routine labs in the morning, Wean off O2 as tolerated 2. IZA: Cr trending up on serial labs, may be from poor oral intake 3. DM: Closely monitor BS while on steroid therapy 4. Generalized weakness: Being followed by PT/OT Tobias Roy MD 
 
4/15/2021

## 2021-04-15 NOTE — PROGRESS NOTES
Pulmonary and Critical Care Progress Note Subjective:  
 
Patient seen and examined Overnight events noted Awake and alert No acute distress Remains on high flow This morning on 60 L 75% FiO2 Chest x-ray continues to show bilateral patchy infiltrates with hypoinflation Review of Systems: A comprehensive review of systems was negative except for that written in the HPI. Current Facility-Administered Medications Medication Dose Route Frequency Provider Last Rate Last Admin  QUEtiapine (SEROquel) tablet 12.5 mg  12.5 mg Oral QHS Uma Rg MD   Stopped at 04/14/21 2200  
 benzocaine-menthoL (CEPACOL) lozenge 1 Lozenge  1 Lozenge Mucous Membrane PRN Uma Rg MD   1 Lozenge at 04/09/21 1640  nystatin (MYCOSTATIN) 100,000 unit/mL oral suspension 500,000 Units  500,000 Units Oral QID Uma Rg MD   500,000 Units at 04/15/21 0570  
 insulin lispro (HUMALOG) injection 3 Units  3 Units SubCUTAneous Avila Mckenna MD   Stopped at 04/14/21 1130  
 dextrose (D50W) injection syrg 12.5-25 g  25-50 mL IntraVENous PRN Uma Rg MD      
 insulin lispro (HUMALOG) injection   SubCUTAneous AC&HS Uma Rg MD   Stopped at 04/14/21 1130  
 atorvastatin (LIPITOR) tablet 10 mg  10 mg Oral DAILY Gretta Purcell MD   10 mg at 04/15/21 1043  
 imipramine (TOFRANIL) tablet 50 mg  50 mg Oral QHS Gretta Purcell MD   Stopped at 04/14/21 2200  levothyroxine (SYNTHROID) tablet 88 mcg  88 mcg Oral ACB Gretta Purcell MD   88 mcg at 04/15/21 0348  [Held by provider] lisinopril-hydroCHLOROthiazide (PRINZIDE, ZESTORETIC) 10-12.5 mg per tablet 1 Tab  1 Tab Oral DAILY Gretta Purcell MD   Stopped at 04/15/21 0900  
 meloxicam (MOBIC) tablet 7.5 mg  7.5 mg Oral DAILY Gretta Purcell MD   7.5 mg at 04/15/21 9497  pantoprazole (PROTONIX) tablet 20 mg  20 mg Oral DAILY Gretta Purcell MD   20 mg at 04/15/21 5122  sertraline (ZOLOFT) tablet 100 mg  100 mg Oral DAILY Roberto Martinez MD   100 mg at 04/15/21 8543  cyclobenzaprine (FLEXERIL) tablet 10 mg  10 mg Oral TID Roberto Martinez MD   10 mg at 04/15/21 6954  
 glucose chewable tablet 16 g  4 Tab Oral PRN Roberto Martinez MD      
 dextrose (D50W) injection syrg 12.5-25 g  25-50 mL IntraVENous PRN Roberto Martinez MD      
 glucagon [facility]) injection 1 mg  1 mg IntraMUSCular PRN Roberto Martinez MD      
 zinc sulfate (ZINCATE) 50 mg zinc (220 mg) capsule 1 Cap  1 Cap Oral DAILY Roberto Martinez MD   1 Cap at 04/15/21 9239  enoxaparin (LOVENOX) injection 40 mg  40 mg SubCUTAneous Q24H Roberto Martinez MD   40 mg at 21 1708  acetaminophen (TYLENOL) tablet 650 mg  650 mg Oral Q6H PRN Roberto Martinez MD   650 mg at 21 1437  
 melatonin tablet 5 mg  5 mg Oral QHS PRN Roberto Martinez MD   5 mg at 21 2142  ondansetron (ZOFRAN) injection 4 mg  4 mg IntraVENous Q4H PRN Roberto Martinez MD   4 mg at 21 2352 No Known Allergies Objective:  
 
Blood pressure 103/64, pulse 100, temperature 98.5 °F (36.9 °C), resp. rate 22, height 5' 4.5\" (1.638 m), weight 94.7 kg (208 lb 12.4 oz), SpO2 95 %, not currently breastfeeding. Temp (24hrs), Av.4 °F (36.9 °C), Min:97.8 °F (36.6 °C), Max:98.7 °F (37.1 °C) Intake and Output: 
Current Shift: No intake/output data recorded. Last 3 Shifts: 1901 - 04/15 0700 In: 240 [P.O.:240] Out: 1800 [Urine:1800] Physical Exam:  
 
General: Lying in bed comfortably, no acute distress, on high flow nasal cannula. Eye: Reactive, symmetric Throat and Neck: Supple Lung: Reduced air entry bilaterally with prolonged exhalation but no wheezing. Occasional crackles. Currently on high flow nasal cannula. Heart: S1+S2. No murmurs Abdomen: soft, non-tender. Bowel sounds normal. No masses; obese Extremities: No edema : Not done Skin: No cyanosis Neurologic: A & O x3. Grossly nonfocal 
Psychiatric: Appropriate affect; coherent Lab/Data Review: 
 
Recent Results (from the past 24 hour(s)) GLUCOSE, POC Collection Time: 04/14/21  4:25 PM  
Result Value Ref Range Glucose (POC) 119 (H) 65 - 100 mg/dL Performed by Kerry CAMACHO [place]) GLUCOSE, POC Collection Time: 04/14/21  8:48 PM  
Result Value Ref Range Glucose (POC) 113 (H) 65 - 100 mg/dL Performed by [place] [employer] METABOLIC PANEL, COMPREHENSIVE Collection Time: 04/15/21  6:40 AM  
Result Value Ref Range Sodium 132 (L) 136 - 145 mmol/L Potassium 4.1 3.5 - 5.1 mmol/L Chloride 100 97 - 108 mmol/L  
 CO2 23 21 - 32 mmol/L Anion gap 9 5 - 15 mmol/L Glucose 71 65 - 100 mg/dL BUN 46 (H) 6 - 20 mg/dL Creatinine 1.55 (H) 0.55 - 1.02 mg/dL BUN/Creatinine ratio 30 (H) 12 - 20 GFR est AA 40 (L) >60 ml/min/1.73m2 GFR est non-AA 33 (L) >60 ml/min/1.73m2 Calcium 8.5 8.5 - 10.1 mg/dL Bilirubin, total 0.7 0.2 - 1.0 mg/dL AST (SGOT) 45 (H) 15 - 37 U/L  
 ALT (SGPT) 32 12 - 78 U/L Alk. phosphatase 82 45 - 117 U/L Protein, total 5.5 (L) 6.4 - 8.2 g/dL Albumin 2.3 (L) 3.5 - 5.0 g/dL Globulin 3.2 2.0 - 4.0 g/dL A-G Ratio 0.7 (L) 1.1 - 2.2    
CBC W/O DIFF Collection Time: 04/15/21  6:40 AM  
Result Value Ref Range WBC 11.8 (H) 3.6 - 11.0 K/uL  
 RBC 4.64 3.80 - 5.20 M/uL  
 HGB 13.4 11.5 - 16.0 g/dL HCT 40.1 35.0 - 47.0 % MCV 86.4 80.0 - 99.0 FL  
 MCH 28.9 26.0 - 34.0 PG  
 MCHC 33.4 30.0 - 36.5 g/dL  
 RDW 16.8 (H) 11.5 - 14.5 % PLATELET 612 687 - 682 K/uL MPV 10.8 8.9 - 12.9 FL  
 NRBC 0.0 0  WBC ABSOLUTE NRBC 0.00 0.00 - 0.01 K/uL GLUCOSE, POC Collection Time: 04/15/21  7:44 AM  
Result Value Ref Range Glucose (POC) 82 65 - 100 mg/dL Performed by Vero Londono XR CHEST PORT Final Result XR CHEST PORT Final Result XR CHEST PORT Final Result XR CHEST PORT Final Result There is persistent airspace disease within the mid to lower lung  
zone predominance. This represents a heterogeneous pattern as might be  
associated with an atypical pneumonia. There has been little interval change. XR CHEST PORT Final Result XR CHEST PORT Final Result XR CHEST PORT Final Result No significant interval change. XR CHEST PORT Final Result CT Results  (Last 48 hours) None Assessment: 1. Acute respiratory failure with hypoxia 2. COVID-19 pneumonia 3. Shortness of breath 4.  Cough 5. Generalized weakness 6. Acute kidney injury 7. Obstructive sleep apnea on CPAP 
8. Obesity Plan:  
 
Currently on high flow nasal cannula 50 L,85%. Decreased to 75% Will use oxygen supplementation as needed to keep saturation above 90% Patient has acute respiratory failure with hypoxia due to COVID-19 pneumonia Status post 5 days of remdesivir Currently on Decadron 6 mg IV every 8 hours, if she improves we will reduce the frequency of this medication. Off azithromycin per ID Status post tocilizumab on 4/5/2021 Vitamin C and zinc 
D-dimer increased to 2.8 from 0.7 May benefit from therapeutic anticoagulation with Lovenox 1 mg/kg every 12 hours after discussion with pharmacy due to abnormal renal function Chest x-ray with persistent bilateral infiltrates and hypoinflation Showed normal LVEF Renal function stable today. Monitor renal function with fluid changes Check electrolytes and replace as needed Lasix discontinued due to hypotension CPAP at night DVT and GI prophylaxis Questions of patient were answered at bedside in detail Case discussed in detail with RN, RT, and care team 
Thank you for involving me in the care of this patient I will follow with you closely during hospitalization Greater than 30 minutes were spent in direct care with this patient.  
 
Milagro Garcia MD 
[facility] 4/15/2021 
8:22 PM

## 2021-04-15 NOTE — PROGRESS NOTES
Hospitalist Progress Note NAME: Anil Purcell :  1945 MRN:  [id number]  
 
76f acute hypoxic respiratory failure s/t Covid-19 pneumonia. Subjective:  
 
 
Patient seen and evaluated at bedside, Remains on hfnc-tolerating. bp soft/low normal, improved from yesterday, responeded to fluid bolus. Review of Systems: 
Symptom Y/N Comments  Symptom Y/N Comments Fever/Chills N   Chest Pain N Poor Appetite Y   Edema N   
Cough Y   Abdominal Pain N Sputum Y   Joint Pain N   
SOB/MADERA Y   Pruritis/Rash N   
Nausea/vomit N   Tolerating PT/OT NA Diarrhea N   Tolerating Diet Y Constipation N   Other Could NOT obtain due to:   
Patient denies any fevers chills nausea vomiting lightheadedness dizziness chest pain palpitations headache focal weakness loss sensation auditory or visual symptoms abdominal stool or urinary complaints or any other associated symptoms Objective: VITALS:  
Last 24hrs VS reviewed since prior progress note. Most recent are: 
Patient Vitals for the past 24 hrs: 
 Temp Pulse Resp BP SpO2  
04/15/21 1624 98 °F (36.7 °C) 97 22 (!) 102/59 91 % 04/15/21 1111     95 % 04/15/21 1100  100 22 103/64 95 % 04/15/21 0807 98.5 °F (36.9 °C) 94 22 (!) 92/54 95 % 04/15/21 0637  93 18 (!) 99/57 95 % 04/15/21 0119 98.7 °F (37.1 °C) 93 18 99/75 93 % 21 2046 98.6 °F (37 °C) 100 18 99/60 93 % 21 2044     95 % Intake/Output Summary (Last 24 hours) at 4/15/2021 1802 Last data filed at 4/15/2021 6539 Gross per 24 hour Intake 240 ml Output 200 ml Net 40 ml PHYSICAL EXAM: 
General: Patient appears comfortable and is speaking clearly EENT:  EOMI. Anicteric sclerae. MMM Resp:  Decreased air entry bilaterally + bilateral bibasilar crackles CV:  Regular  rhythm, S1 plus S2, no murmurs rubs or gallops  No edema GI:  Soft, Non distended, Non tender. +Bowel sounds Neurologic:  Alert and oriented X 3, normal speech, Psych: Good insight. Not anxious nor agitated Skin:  No rashes. No jaundice. IV infiltrated rt forearm. Procedures: see electronic medical records for all procedures/Xrays and details which were not copied into this note but were reviewed prior to creation of Plan. LABS: 
I reviewed today's most current labs and imaging studies. Pertinent labs include: 
Recent Labs 04/05/21 7123 WBC 5.3 HGB 11.5 HCT 35.1  Recent Labs 04/05/21 
7124   
K 5.4*  
 CO2 27 * BUN 25* CREA 1.07* CA 8.3* ALB 2.4* TBILI 0.2 ALT 25 Signed: Lacy Luna MD 
 
Recent Results (from the past 24 hour(s)) GLUCOSE, POC Collection Time: 04/14/21  8:48 PM  
Result Value Ref Range Glucose (POC) 113 (H) 65 - 100 mg/dL Performed by Stanley Affymax METABOLIC PANEL, COMPREHENSIVE Collection Time: 04/15/21  6:40 AM  
Result Value Ref Range Sodium 132 (L) 136 - 145 mmol/L Potassium 4.1 3.5 - 5.1 mmol/L Chloride 100 97 - 108 mmol/L  
 CO2 23 21 - 32 mmol/L Anion gap 9 5 - 15 mmol/L Glucose 71 65 - 100 mg/dL BUN 46 (H) 6 - 20 mg/dL Creatinine 1.55 (H) 0.55 - 1.02 mg/dL BUN/Creatinine ratio 30 (H) 12 - 20 GFR est AA 40 (L) >60 ml/min/1.73m2 GFR est non-AA 33 (L) >60 ml/min/1.73m2 Calcium 8.5 8.5 - 10.1 mg/dL Bilirubin, total 0.7 0.2 - 1.0 mg/dL AST (SGOT) 45 (H) 15 - 37 U/L  
 ALT (SGPT) 32 12 - 78 U/L Alk. phosphatase 82 45 - 117 U/L Protein, total 5.5 (L) 6.4 - 8.2 g/dL Albumin 2.3 (L) 3.5 - 5.0 g/dL Globulin 3.2 2.0 - 4.0 g/dL A-G Ratio 0.7 (L) 1.1 - 2.2    
CBC W/O DIFF Collection Time: 04/15/21  6:40 AM  
Result Value Ref Range WBC 11.8 (H) 3.6 - 11.0 K/uL  
 RBC 4.64 3.80 - 5.20 M/uL  
 HGB 13.4 11.5 - 16.0 g/dL HCT 40.1 35.0 - 47.0 % MCV 86.4 80.0 - 99.0 FL  
 MCH 28.9 26.0 - 34.0 PG  
 MCHC 33.4 30.0 - 36.5 g/dL  
 RDW 16.8 (H) 11.5 - 14.5 % PLATELET 904 973 - 459 K/uL  MPV 10.8 8.9 - 12.9 FL  
 NRBC 0.0 0  WBC ABSOLUTE NRBC 0.00 0.00 - 0.01 K/uL GLUCOSE, POC Collection Time: 04/15/21  7:44 AM  
Result Value Ref Range Glucose (POC) 82 65 - 100 mg/dL Performed by Melissa Pardo GLUCOSE, POC Collection Time: 04/15/21  5:30 PM  
Result Value Ref Range Glucose (POC) 149 (H) 65 - 100 mg/dL Performed by Juanita Bran X-ray chest:The cardiac silhouette is stable. Lung volumes remain low. Opacities 
in lungs most pronounced at the bases are not significantly changed. No 
hydrostatic edema, pleural effusion or pneumothorax is identified. The osseous 
structures are stable. Reviewed most current lab test results and cultures  YES Reviewed most current radiology test results   YES Review and summation of old records today    NO Reviewed patient's current orders and MAR    YES 
PMH/ reviewed - no change compared to H&P Assessment / Plan: 
Acute respiratory failure with hypoxia secondary to COVID-19 pna: -patient presented with acute respiratory failure with hypoxia requiring high flow nasal cannula for ventilatory support and based on patient's clinical presentation secondary to COVID-19 pneumonia, patient does not meet sepsis criteria at this time Completed Redemsivir Continue Decadron 6 mg IV every 8 Continue azithromycin once daily Continue to monitor respiratory status Pulmonology consult appreciated, Infectious disease consult appreciated Hypertension hx Profoundly hypotensive am 4/14- responded to fluids 
bp rx and lasix held Judiciously hydrate. Iv infiltrated Rt forearm- elevate/prn warm compress. Hypothyroidism 
continue Synthroid Gastroesophageal reflux disease 
continue Protonix once daily Hyperglycemia due to type 2 diabetes  
likely iatrogenic/steroids, currently controlled 
continue lispro tid according to 24 hour coverage Continue insulin sliding scale Depression/anxietycontinue home Zoloft ProphylaxisLovenox FENcardiac diet, replete potassium and magnesium Full code, PODANILO is daughter Indiana Sanchez [phone] Dispositionpending course, remains on hfnc. 
 
 
25.0 - 29.9 Overweight / Body mass index is 35.28 kg/m². 
 
  
 
________________________________________________________________________ Care Plan discussed with: 
  Comments Patient X Family RN X   
Care Manager x Consultant  X   
                 x Multidiciplinary team rounds were held today with , nursing, pharmacist and clinical coordinator. Patient's plan of care was discussed; medications were reviewed and discharge planning was addressed. ________________________________________________________________________ Comments >50% of visit spent in counseling and coordination of care X   
________________________________________________________________________ Alison Whitaker MD

## 2021-04-16 NOTE — PROGRESS NOTES
Pulmonary and Critical Care Progress Note Subjective:  
 
Patient seen and examined Overnight events noted Awake and alert No acute distress Remains on high flow This morning on 60 L 75% FiO2 Chest x-ray continues to show bilateral patchy infiltrates with hypoinflation Review of Systems: A comprehensive review of systems was negative except for that written in the HPI. Current Facility-Administered Medications Medication Dose Route Frequency Provider Last Rate Last Admin  enoxaparin (LOVENOX) injection 90 mg  1 mg/kg SubCUTAneous Q12H Brit CARRASCO MD   90 mg at 04/16/21 1212  QUEtiapine (SEROquel) tablet 12.5 mg  12.5 mg Oral QHS CemdaWinnie carvajal MD   12.5 mg at 04/15/21 2158  benzocaine-menthoL (CEPACOL) lozenge 1 Lozenge  1 Lozenge Mucous Membrane PRN Blanca Gr MD   1 Lozenge at 04/09/21 1640  nystatin (MYCOSTATIN) 100,000 unit/mL oral suspension 500,000 Units  500,000 Units Oral QID Camryn PentaWinnie MD   Stopped at 04/16/21 0900  
 insulin lispro (HUMALOG) injection 3 Units  3 Units SubCUTAneous TIDAC Winnie Marinelli MD   Stopped at 04/16/21 0730  
 dextrose (D50W) injection syrg 12.5-25 g  25-50 mL IntraVENous PRN Blanca Gr MD      
 insulin lispro (HUMALOG) injection   SubCUTAneous AC&HS Blanca Gr MD   Stopped at 04/15/21 2200  
 atorvastatin (LIPITOR) tablet 10 mg  10 mg Oral DAILY Meena Gannon MD   10 mg at 04/16/21 1212  
 imipramine (TOFRANIL) tablet 50 mg  50 mg Oral QHS Meena Gannon MD   50 mg at 04/15/21 2158  levothyroxine (SYNTHROID) tablet 88 mcg  88 mcg Oral ACB Meena Gannon MD   88 mcg at 04/16/21 1212  [Held by provider] lisinopril-hydroCHLOROthiazide (PRINZIDE, ZESTORETIC) 10-12.5 mg per tablet 1 Tab  1 Tab Oral DAILY Meena Gannon MD   Stopped at 04/15/21 0900  
 meloxicam (MOBIC) tablet 7.5 mg  7.5 mg Oral DAILY Meena Gannon MD   7.5 mg at 21 1212  pantoprazole (PROTONIX) tablet 20 mg  20 mg Oral DAILY Jacinda Stoll MD   20 mg at 21 1212  sertraline (ZOLOFT) tablet 100 mg  100 mg Oral DAILY Jacinda Stoll MD   100 mg at 21 1212  cyclobenzaprine (FLEXERIL) tablet 10 mg  10 mg Oral TID Jacinda Stoll MD   10 mg at 21 1212  
 glucose chewable tablet 16 g  4 Tab Oral PRN Jacinda Stoll MD      
 dextrose (D50W) injection syrg 12.5-25 g  25-50 mL IntraVENous PRN Jacinda Stoll MD      
 glucagon [facility]) injection 1 mg  1 mg IntraMUSCular PRN Jacinda Stoll MD      
 zinc sulfate (ZINCATE) 50 mg zinc (220 mg) capsule 1 Cap  1 Cap Oral DAILY Jacinda Stoll MD   1 Cap at 21 121  acetaminophen (TYLENOL) tablet 650 mg  650 mg Oral Q6H PRN Jacinda Stoll MD   650 mg at 04/15/21 2158  melatonin tablet 5 mg  5 mg Oral QHS PRN Jacinda Stoll MD   5 mg at 04/15/21 215  ondansetron (ZOFRAN) injection 4 mg  4 mg IntraVENous Q4H PRN Jacinda Stoll MD   4 mg at 212 No Known Allergies Objective:  
 
Blood pressure 115/68, pulse 96, temperature 98.2 °F (36.8 °C), resp. rate 20, height 5' 4.5\" (1.638 m), weight 94.7 kg (208 lb 12.4 oz), SpO2 98 %, not currently breastfeeding. Temp (24hrs), Av °F (36.7 °C), Min:97.7 °F (36.5 °C), Max:98.2 °F (36.8 °C) Intake and Output: 
Current Shift: No intake/output data recorded. Last 3 Shifts: 1901 -  0700 In: 240 [P.O.:240] Out: 200 [Urine:200] Physical Exam:  
 
General: Lying in bed comfortably, no acute distress, on high flow nasal cannula. Eye: Reactive, symmetric Throat and Neck: Supple Lung: Reduced air entry bilaterally with prolonged exhalation but no wheezing. Occasional crackles. Currently on high flow nasal cannula. Heart: S1+S2. No murmurs Abdomen: soft, non-tender. Bowel sounds normal. No masses; obese Extremities: No edema : Not done Skin: No cyanosis Neurologic: A & O x3. Grossly nonfocal 
Psychiatric: Appropriate affect; coherent Lab/Data Review: 
 
Recent Results (from the past 24 hour(s)) GLUCOSE, POC Collection Time: 04/15/21  5:30 PM  
Result Value Ref Range Glucose (POC) 149 (H) 65 - 100 mg/dL Performed by Virginia Pritchett, POC Collection Time: 04/15/21  9:48 PM  
Result Value Ref Range Glucose (POC) 71 65 - 100 mg/dL Performed by Heidy Kong, POC Collection Time: 04/16/21  8:37 AM  
Result Value Ref Range Glucose (POC) 95 65 - 100 mg/dL Performed by Ronni Pedro METABOLIC PANEL, COMPREHENSIVE Collection Time: 04/16/21  8:50 AM  
Result Value Ref Range Sodium 135 (L) 136 - 145 mmol/L Potassium 3.7 3.5 - 5.1 mmol/L Chloride 104 97 - 108 mmol/L  
 CO2 24 21 - 32 mmol/L Anion gap 7 5 - 15 mmol/L Glucose 94 65 - 100 mg/dL BUN 29 (H) 6 - 20 mg/dL Creatinine 0.98 0.55 - 1.02 mg/dL BUN/Creatinine ratio 30 (H) 12 - 20 GFR est AA >60 >60 ml/min/1.73m2 GFR est non-AA 55 (L) >60 ml/min/1.73m2 Calcium 8.6 8.5 - 10.1 mg/dL Bilirubin, total 0.5 0.2 - 1.0 mg/dL AST (SGOT) 37 15 - 37 U/L  
 ALT (SGPT) 30 12 - 78 U/L Alk. phosphatase 87 45 - 117 U/L Protein, total 5.7 (L) 6.4 - 8.2 g/dL Albumin 2.4 (L) 3.5 - 5.0 g/dL Globulin 3.3 2.0 - 4.0 g/dL A-G Ratio 0.7 (L) 1.1 - 2.2 GLUCOSE, POC Collection Time: 04/16/21 12:06 PM  
Result Value Ref Range Glucose (POC) 142 (H) 65 - 100 mg/dL Performed by Ronni Pedro   
 
 
XR CHEST PORT Final Result XR CHEST PORT Final Result XR CHEST PORT Final Result XR CHEST PORT Final Result There is persistent airspace disease within the mid to lower lung  
zone predominance. This represents a heterogeneous pattern as might be  
associated with an atypical pneumonia. There has been little interval change. XR CHEST PORT Final Result XR CHEST PORT Final Result XR CHEST PORT Final Result No significant interval change. XR CHEST PORT Final Result CT Results  (Last 48 hours) None Assessment: 1. Acute respiratory failure with hypoxia 2. COVID-19 pneumonia 3. Shortness of breath 4.  Cough 5. Generalized weakness 6. Acute kidney injury 7. Obstructive sleep apnea on CPAP 
8. Obesity Plan:  
 
Currently on high flow nasal cannula 50 L,85%. Decreased to 75% Will use oxygen supplementation as needed to keep saturation above 90% Patient has acute respiratory failure with hypoxia due to COVID-19 pneumonia Status post 5 days of remdesivir Currently on Decadron 6 mg IV every 8 hours, if she improves we will reduce the frequency of this medication. Off azithromycin per ID Status post tocilizumab on 4/5/2021 Vitamin C and zinc 
D-dimer increased to 2.8 from 0.7 Start Lovenox 1 mg/kg every 12 hours Chest x-ray with persistent bilateral infiltrates and hypoinflation Showed normal LVEF Renal function stable today. Monitor renal function with fluid changes Check electrolytes and replace as needed Lasix discontinued due to hypotension CPAP at night DVT and GI prophylaxis Questions of patient were answered at bedside in detail Case discussed in detail with RN, RT, and care team 
Thank you for involving me in the care of this patient I will follow with you closely during hospitalization Greater than 30 minutes were spent in direct care with this patient. Zoya Miranda MD 
[facility] 4/16/2021 
8:22 PM

## 2021-04-16 NOTE — PROGRESS NOTES
Infectious Disease Progress Note Subjective:  
Doing well, denies new complaints. No acute events since last seen. Remains on high flow O2, daughter requesting a repeat COVID test so she can visit. Very weak Objective:  
Physical Exam:  
 
Visit Vitals /63 (BP Patient Position: At rest;Sitting) Pulse (!) 104 Temp 97.4 °F (36.3 °C) Resp 18 Ht 5' 4.5\" (1.638 m) Wt 208 lb 12.4 oz (94.7 kg) SpO2 94% Breastfeeding No  
BMI 35.28 kg/m² O2 Flow Rate (L/min): 60 l/min O2 Device: Heated, Hi flow nasal cannula Temp (24hrs), Av.9 °F (36.6 °C), Min:97.4 °F (36.3 °C), Max:98.2 °F (36.8 °C) No intake/output data recorded.  1901 -  0700 In: 240 [P.O.:240] Out: 200 [Urine:200] General: NAD, lethargic, confused HEENT: MICHELLE, Moist mucosa, on high flow Lungs: CTA b/l, no wheeze/rhonchi Heart: S1S2+, RRR, no murmur Abdo: Soft, NT, ND, +BS Exts: No edema, + pulses b/l  
Skin: No wounds, No rashes or lesions Data Review:  
   
Recent Days: 
Recent Labs 04/15/21 
1332 21 
9195 WBC 11.8* 13.8* HGB 13.4 14.4 HCT 40.1 42.7  250 Recent Labs 21 
5430 04/15/21 
3438 21 
3402 BUN 29* 46* 38* CREA 0.98 1.55* 1.13* Lab Results Component Value Date/Time C-Reactive protein 1.69 (H) 2021 08:53 AM  
  
Microbiology: None Diagnostics CXR Results  (Last 48 hours) 04/15/21 0857  XR CHEST PORT Final result Narrative:  Chest single view. Comparison single view chest 2021. Patchy alveolar opacities throughout the lungs persist. Cardiac and mediastinal  
structures unchanged. No pneumothorax or sizable pleural effusion. Assessment/Plan 1. COVID-19 pneumonitis, persistent bilateral patchy infiltrates on CXR (04/15) S/p 5 days of Remdesivir and Azithromycin, s/p tocilizumab x 1 dose. Still w sig amounts of O2, remains on High flow Afebrile, leukocytosis trending down on serial labs No strong indication for systemic antibiotics. CXR in AM  
    Repeat COVID 19 test ordered so daughter can visit 2. IZA: Resolved, Cr at baseline 3. DM: Closely monitor BS while on steroid therapy 4. Generalized weakness/severe deconditioning: Being followed by PT/OT Richard Blanco MD 
 
4/16/2021

## 2021-04-16 NOTE — PROGRESS NOTES
Problem: Risk for Spread of Infection Goal: Prevent transmission of infectious organism to others Description: Prevent the transmission of infectious organisms to other patients, staff members, and visitors. Outcome: Progressing Towards Goal 
  
Problem: Patient Education:  Go to Education Activity Goal: Patient/Family Education Outcome: Progressing Towards Goal 
  
Problem: Falls - Risk of 
Goal: *Absence of Falls Description: Document Jeanine Anguiano Fall Risk and appropriate interventions in the flowsheet. Outcome: Progressing Towards Goal 
Note: Fall Risk Interventions: 
Mobility Interventions: Bed/chair exit alarm, OT consult for ADLs, PT Consult for mobility concerns Mentation Interventions: Bed/chair exit alarm, Adequate sleep, hydration, pain control Medication Interventions: Bed/chair exit alarm Elimination Interventions: Call light in reach, Bed/chair exit alarm History of Falls Interventions: Bed/chair exit alarm Problem: Patient Education: Go to Patient Education Activity Goal: Patient/Family Education Outcome: Progressing Towards Goal 
  
Problem: Patient Education: Go to Patient Education Activity Goal: Patient/Family Education Outcome: Progressing Towards Goal

## 2021-04-16 NOTE — PROGRESS NOTES
Discharge plan remains the same home w/HH. Accepting [facility] agency is [facility]. SOC to begin x 24-48 hours after discharge. Requires Medicare 2nd IM letter prior to discharge. Will continue to monitor and follow. Laura Shelton, MSTYREE

## 2021-04-16 NOTE — PROGRESS NOTES
Hospitalist Progress Note NAME: Ricardo Mallory :  1945 MRN:  [id number]  
 
76f acute hypoxic respiratory failure s/t Covid-19 pneumonia. Subjective:  
 
 
Patient seen and evaluated at bedside, Remains on hfnc-tolerating. Earlier today on pulmonology visit pt had o2 off, hypoxic <60%, tachycardia. Reecovered with resumption of hfnc, now 60 l/m @ 75%, sao2 ~91 
 
bp soft/low normal, improved from yesterday, responeded to fluid bolus. Review of Systems: 
Symptom Y/N Comments  Symptom Y/N Comments Fever/Chills N   Chest Pain N Poor Appetite Y   Edema N   
Cough Y   Abdominal Pain N Sputum Y   Joint Pain N   
SOB/MADERA Y   Pruritis/Rash N   
Nausea/vomit N   Tolerating PT/OT NA Diarrhea N   Tolerating Diet Y Constipation N   Other Could NOT obtain due to:   
Patient denies any fevers chills nausea vomiting lightheadedness dizziness chest pain palpitations headache focal weakness loss sensation auditory or visual symptoms abdominal stool or urinary complaints or any other associated symptoms Objective: VITALS:  
Last 24hrs VS reviewed since prior progress note. Most recent are: 
Patient Vitals for the past 24 hrs: 
 Temp Pulse Resp BP SpO2  
21 1521 97.4 °F (36.3 °C) (!) 104 18 116/63 94 % 21 1223     93 % 21 0855 98.2 °F (36.8 °C) 96 20 115/68 98 % 21 0610     95 % 21 0100 98 °F (36.7 °C) 94 22 106/61 92 % 04/15/21 2011 97.7 °F (36.5 °C) 98 22 124/69 94 % No intake or output data in the 24 hours ending 21 1740 PHYSICAL EXAM: 
General: Patient appears comfortable and is speaking clearly EENT:  EOMI. Anicteric sclerae. MMM Resp:  Decreased air entry bilaterally + bilateral bibasilar crackles CV:  Regular  rhythm, S1 plus S2, no murmurs rubs or gallops  No edema GI:  Soft, Non distended, Non tender. +Bowel sounds Neurologic:  Alert and oriented X 3, normal speech, Psych:   Good insight.  Not anxious nor agitated Skin:  No rashes. No jaundice. IV infiltrated rt forearm. Procedures: see electronic medical records for all procedures/Xrays and details which were not copied into this note but were reviewed prior to creation of Plan. LABS: 
I reviewed today's most current labs and imaging studies. Pertinent labs include: 
Recent Labs 04/05/21 
9533 WBC 5.3 HGB 11.5 HCT 35.1  Recent Labs 04/05/21 
3932   
K 5.4*  
 CO2 27 * BUN 25* CREA 1.07* CA 8.3* ALB 2.4* TBILI 0.2 ALT 25 Signed: Chuck Mcelroy MD 
 
Recent Results (from the past 24 hour(s)) GLUCOSE, POC Collection Time: 04/15/21  9:48 PM  
Result Value Ref Range Glucose (POC) 71 65 - 100 mg/dL Performed by Waldemar Richardson, POC Collection Time: 04/16/21  8:37 AM  
Result Value Ref Range Glucose (POC) 95 65 - 100 mg/dL Performed by Elo Walker METABOLIC PANEL, COMPREHENSIVE Collection Time: 04/16/21  8:50 AM  
Result Value Ref Range Sodium 135 (L) 136 - 145 mmol/L Potassium 3.7 3.5 - 5.1 mmol/L Chloride 104 97 - 108 mmol/L  
 CO2 24 21 - 32 mmol/L Anion gap 7 5 - 15 mmol/L Glucose 94 65 - 100 mg/dL BUN 29 (H) 6 - 20 mg/dL Creatinine 0.98 0.55 - 1.02 mg/dL BUN/Creatinine ratio 30 (H) 12 - 20 GFR est AA >60 >60 ml/min/1.73m2 GFR est non-AA 55 (L) >60 ml/min/1.73m2 Calcium 8.6 8.5 - 10.1 mg/dL Bilirubin, total 0.5 0.2 - 1.0 mg/dL AST (SGOT) 37 15 - 37 U/L  
 ALT (SGPT) 30 12 - 78 U/L Alk. phosphatase 87 45 - 117 U/L Protein, total 5.7 (L) 6.4 - 8.2 g/dL Albumin 2.4 (L) 3.5 - 5.0 g/dL Globulin 3.3 2.0 - 4.0 g/dL A-G Ratio 0.7 (L) 1.1 - 2.2 GLUCOSE, POC Collection Time: 04/16/21 12:06 PM  
Result Value Ref Range Glucose (POC) 142 (H) 65 - 100 mg/dL Performed by Elo Walker X-ray chest:The cardiac silhouette is stable. Lung volumes remain low.   Opacities 
in lungs most pronounced at the bases are not significantly changed. No 
hydrostatic edema, pleural effusion or pneumothorax is identified. The osseous 
structures are stable. Reviewed most current lab test results and cultures  YES Reviewed most current radiology test results   YES Review and summation of old records today    NO Reviewed patient's current orders and MAR    YES 
PMH/SH reviewed - no change compared to H&P Assessment / Plan: 
Acute respiratory failure with hypoxia secondary to COVID-19 pna: -patient presented with acute respiratory failure with hypoxia requiring high flow nasal cannula for ventilatory support and based on patient's clinical presentation secondary to COVID-19 pneumonia, patient does not meet sepsis criteria at this time Completed Redemsivir Continue Decadron 6 mg IV every 8 Continue azithromycin once daily Continue to monitor respiratory status Pulmonology consult appreciated, Infectious disease consult appreciated Remains on hfnc, subtle improvement but looks a bit more labored. Resume lasix iv daily D Dimer increased, inc lovenox to 1mg/kg bid Hypertension hx Profoundly hypotensive am 4/14- responded to fluids 
bp rx and lasix held, bp stable, resume lasix daily Judiciously hydrate. Iv infiltrated Rt forearm- elevate/prn warm compress. Hypothyroidism 
continue Synthroid Gastroesophageal reflux disease 
continue Protonix once daily Hyperglycemia due to type 2 diabetes  
likely iatrogenic/steroids, currently controlled 
continue lispro tid according to 24 hour coverage Continue insulin sliding scale Depression/anxietycontinue home Zoloft ProphylaxisLovenox FENcardiac diet, replete potassium and magnesium Full code, PODANILO is daughter Kori Ao [phone] Dispositionpending course, remains on hfnc. 
 
 
25.0 - 29.9 Overweight / Body mass index is 35.28 kg/m². 
 
  
 
________________________________________________________________________ Care Plan discussed with: 
  Comments Patient X Family RN X   
Care Manager x Consultant  X   
                 x Multidiciplinary team rounds were held today with , nursing, pharmacist and clinical coordinator. Patient's plan of care was discussed; medications were reviewed and discharge planning was addressed. ________________________________________________________________________ Comments >50% of visit spent in counseling and coordination of care X   
________________________________________________________________________ Edith Monk MD

## 2021-04-17 NOTE — PROGRESS NOTES
Infectious Disease Progress Note Subjective: On BiPAP during my assessment at 100% FiO2, started on daily Lasix on  by primary. No acute events since last seen. Denies new complaints Objective:  
Physical Exam:  
 
Visit Vitals /64 (BP 1 Location: Left lower arm, BP Patient Position: At rest) Pulse 100 Temp 98.6 °F (37 °C) Resp 20 Ht 5' 4.5\" (1.638 m) Wt 208 lb 12.4 oz (94.7 kg) SpO2 100% Breastfeeding No  
BMI 35.28 kg/m² O2 Flow Rate (L/min): 60 l/min O2 Device: CPAP mask Temp (24hrs), Av.6 °F (37 °C), Min:97.9 °F (36.6 °C), Max:99 °F (37.2 °C) 
  701 - 1900 In: -  
Out: 600 [Urine:600]   04/15 1901 -  07 In: 300 [P.O.:300] Out: - General: NAD, lethargic, confused HEENT: MICHELLE, Moist mucosa, on high flow Lungs: CTA b/l, no wheeze/rhonchi Heart: S1S2+, RRR, no murmur Abdo: Soft, NT, ND, +BS Exts: No edema, + pulses b/l  
Skin: No wounds, No rashes or lesions Data Review:  
   
Recent Days: 
Recent Labs 21 
7863 04/15/21 
3602 WBC 11.0 11.8* HGB 13.5 13.4 HCT 41.0 40.1  199 Recent Labs 21 
8213 21 
1783 04/15/21 
7153 BUN 24* 29* 46* CREA 1.01 0.98 1.55* Lab Results Component Value Date/Time C-Reactive protein 1.69 (H) 2021 08:53 AM  
  
Microbiology: None Diagnostics CXR Results  (Last 48 hours) 21 0821  XR CHEST PORT Final result Impression:  Increased moderate diffuse bilateral airspace opacities, likely a combination of  
edema and airspace disease. Developing ARDS not excluded. Narrative:  AP portable chest, 0753 hours. Comparison: 4/15/2021.   
   
Findings: Multiple cardiac monitoring leads overlie the chest. The heart size is  
at the upper limits of normal. There are moderate diffuse bilateral airspace  
opacities, slightly increased from the prior exam. No pleural effusion or  
pneumothorax is identified. There are senescent changes within the spine. Assessment/Plan 1. COVID-19 pneumonitis, persistent b/l patchy infiltrates on CXR (04/15) Combination of edema and b/l infiltrates on today's CXR Still requiring significant amounts of oxygen, was on BiPAP during my assessment today Already on Lasix, will add Zosyn for suspected aspiration PNA Afebrile moderate leukocytosis of 11 K on today's labs COVID-19 test repeated on 4/16, will follow results 2. IZA: Resolved, Cr at baseline 3. DM: Closely monitor BS while on steroid therapy 4. Generalized weakness/severe deconditioning: Being followed by PT/OT Dashawn Garcia MD 
 
4/17/2021

## 2021-04-17 NOTE — PROGRESS NOTES
Hospitalist Progress Note NAME: Florencio Butler :  1945 MRN:  [id number]  
 
76f acute hypoxic respiratory failure s/t Covid-19 pneumonia. Subjective:  
 
 
Patient seen and evaluated at bedside, Now cpap 100%, sao2 88-99% during visit. Speaking and appears relatively lucid. Pulmonology appreciated. Cxr-increasing moderate bilateral airspace disease, likely combination of edema and airspace disease- 
  
 
bp soft/low normal, improved from yesterday, responeded to fluid bolus. She does appear to better interact today vs yesterday but appearing fatigued. Review of Systems: 
Symptom Y/N Comments  Symptom Y/N Comments Fever/Chills N   Chest Pain N Poor Appetite Y   Edema N   
Cough Y   Abdominal Pain N Sputum Y   Joint Pain N   
SOB/MADERA Y   Pruritis/Rash N   
Nausea/vomit N   Tolerating PT/OT NA Diarrhea N   Tolerating Diet Y Constipation N   Other Could NOT obtain due to:   
Patient denies any fevers chills nausea vomiting lightheadedness dizziness chest pain palpitations headache focal weakness loss sensation auditory or visual symptoms abdominal stool or urinary complaints or any other associated symptoms Objective: VITALS:  
Last 24hrs VS reviewed since prior progress note. Most recent are: 
Patient Vitals for the past 24 hrs: 
 Temp Pulse Resp BP SpO2  
21 1425 98.6 °F (37 °C) 100 20 104/64 100 % 21 1016     98 % 21 0830 97.9 °F (36.6 °C) 87 20 (!) 187/71 99 % 21 0013 99 °F (37.2 °C) 95 20 (!) 92/58 94 % 21 2141     91 % 21 2041     93 % 21 1924 98.9 °F (37.2 °C) (!) 103 20 107/81 93 % Intake/Output Summary (Last 24 hours) at 2021 1717 Last data filed at 2021 1427 Gross per 24 hour Intake 300 ml Output 600 ml Net -300 ml PHYSICAL EXAM: 
General: Patient appears comfortable and is speaking clearly thru cpap mask- difficult though to understand. EENT:  EOMI.  Anicteric sclerae. MMM Resp:  Decreased air entry bilaterally + bilateral bibasilar crackles CV:  Regular  rhythm, S1 plus S2, no murmurs rubs or gallops  No edema GI:  Soft, Non distended, Non tender. +Bowel sounds Neurologic:  Alert and oriented X 3, normal speech, Psych:   Good insight. Not anxious nor agitated Skin:  No rashes. No jaundice. IV infiltrated rt forearm. Procedures: see electronic medical records for all procedures/Xrays and details which were not copied into this note but were reviewed prior to creation of Plan. LABS: 
I reviewed today's most current labs and imaging studies. Pertinent labs include: 
Recent Labs 04/05/21 
5646 WBC 5.3 HGB 11.5 HCT 35.1  Recent Labs 04/05/21 
3556   
K 5.4*  
 CO2 27 * BUN 25* CREA 1.07* CA 8.3* ALB 2.4* TBILI 0.2 ALT 25 Signed: Shila Bar MD 
 
Recent Results (from the past 24 hour(s)) GLUCOSE, POC Collection Time: 04/16/21  5:32 PM  
Result Value Ref Range Glucose (POC) 100 65 - 100 mg/dL Performed by Carmelina Gillis   
GLUCOSE, POC Collection Time: 04/16/21  8:53 PM  
Result Value Ref Range Glucose (POC) 116 (H) 65 - 100 mg/dL Performed by Blaise Art GLUCOSE, POC Collection Time: 04/17/21  8:34 AM  
Result Value Ref Range Glucose (POC) 94 65 - 100 mg/dL Performed by Dayo Henson SARS-COV-2 Collection Time: 04/17/21  9:45 AM  
Result Value Ref Range SARS-CoV-2 Please find results under separate order METABOLIC PANEL, COMPREHENSIVE Collection Time: 04/17/21  9:55 AM  
Result Value Ref Range Sodium 135 (L) 136 - 145 mmol/L Potassium 3.6 3.5 - 5.1 mmol/L Chloride 100 97 - 108 mmol/L  
 CO2 27 21 - 32 mmol/L Anion gap 8 5 - 15 mmol/L Glucose 104 (H) 65 - 100 mg/dL BUN 24 (H) 6 - 20 mg/dL Creatinine 1.01 0.55 - 1.02 mg/dL BUN/Creatinine ratio 24 (H) 12 - 20 GFR est AA >60 >60 ml/min/1.73m2  GFR est non-AA 54 (L) >60 ml/min/1.73m2 Calcium 8.9 8.5 - 10.1 mg/dL Bilirubin, total 0.5 0.2 - 1.0 mg/dL AST (SGOT) 44 (H) 15 - 37 U/L  
 ALT (SGPT) 28 12 - 78 U/L Alk. phosphatase 91 45 - 117 U/L Protein, total 6.4 6.4 - 8.2 g/dL Albumin 2.9 (L) 3.5 - 5.0 g/dL Globulin 3.5 2.0 - 4.0 g/dL A-G Ratio 0.8 (L) 1.1 - 2.2    
CBC WITH AUTOMATED DIFF Collection Time: 04/17/21  9:55 AM  
Result Value Ref Range WBC 11.0 3.6 - 11.0 K/uL  
 RBC 4.64 3.80 - 5.20 M/uL  
 HGB 13.5 11.5 - 16.0 g/dL HCT 41.0 35.0 - 47.0 % MCV 88.4 80.0 - 99.0 FL  
 MCH 29.1 26.0 - 34.0 PG  
 MCHC 32.9 30.0 - 36.5 g/dL  
 RDW 17.4 (H) 11.5 - 14.5 % PLATELET 873 622 - 085 K/uL MPV 11.1 8.9 - 12.9 FL  
 NEUTROPHILS 85 (H) 32 - 75 % LYMPHOCYTES 6 (L) 12 - 49 % MONOCYTES 4 (L) 5 - 13 % EOSINOPHILS 4 0 - 7 % BASOPHILS 0 0 - 1 % IMMATURE GRANULOCYTES 1 (H) 0.0 - 0.5 % ABS. NEUTROPHILS 9.5 (H) 1.8 - 8.0 K/UL  
 ABS. LYMPHOCYTES 0.7 (L) 0.8 - 3.5 K/UL  
 ABS. MONOCYTES 0.4 0.0 - 1.0 K/UL  
 ABS. EOSINOPHILS 0.4 0.0 - 0.4 K/UL  
 ABS. BASOPHILS 0.0 0.0 - 0.1 K/UL  
 ABS. IMM. GRANS. 0.1 (H) 0.00 - 0.04 K/UL  
 DF AUTOMATED    
GLUCOSE, POC Collection Time: 04/17/21 11:13 AM  
Result Value Ref Range Glucose (POC) 184 (H) 65 - 100 mg/dL Performed by Hulan Cheek GLUCOSE, POC Collection Time: 04/17/21  4:23 PM  
Result Value Ref Range Glucose (POC) 99 65 - 100 mg/dL Performed by Darya Menard X-ray chest:The cardiac silhouette is stable. Lung volumes remain low. Opacities 
in lungs most pronounced at the bases are not significantly changed. No 
hydrostatic edema, pleural effusion or pneumothorax is identified. The osseous 
structures are stable. Reviewed most current lab test results and cultures  YES Reviewed most current radiology test results   YES Review and summation of old records today    NO Reviewed patient's current orders and MAR    YES 
PMH/SH reviewed - no change compared to H&P Assessment / Plan: 
Acute respiratory failure with hypoxia secondary to COVID-19 pna: -patient presented with acute respiratory failure with hypoxia requiring high flow nasal cannula for ventilatory support and based on patient's clinical presentation secondary to COVID-19 pneumonia, patient does not meet sepsis criteria at this time Completed Redemsivir Continue Decadron 6 mg IV every 8 Continue azithromycin once daily Continue to monitor respiratory status Pulmonology consult appreciated, Infectious disease consult appreciated Now on cpap 100%- cxr persistent infiltrates +/- edema- lasix resumed 4/16 D Dimer increased, inc lovenox to 1mg/kg bid. If deteriorates or increasingly fatigues will move back to icu as also pt has intermittently removed O2/mask. She was moved closer to nurses station but limited obs 2/2 door closed 2/2 covid. Video monitor is at bedside so nursing can observe closely. Hypertension hx Profoundly hypotensive am 4/14- responded to fluids 
bp rx and lasix held, bp subsequently & resumed lasix as appeared more labored and suspecting edema component Judiciously hydrate. Hypothyroidism 
continue Synthroid Gastroesophageal reflux disease 
continue Protonix once daily Hyperglycemia due to type 2 diabetes  
likely iatrogenic/steroids, currently controlled 
continue lispro tid according to 24 hour coverage Continue insulin sliding scale Depression/anxietycontinue home Zoloft Hx breast ca/meningioma. ProphylaxisLovenox FENcardiac diet, replete potassium and magnesium Full code, POA is daughter Jose Parekh [phone], updated. Dispositionpending course, remains on cpap. 25.0 - 29.9 Overweight / Body mass index is 35.28 kg/m². 
 
  
 
________________________________________________________________________ Care Plan discussed with: 
  Comments Patient X Family RN X   
Care Manager Consultant  X Multidiciplinary team rounds were held today with , nursing, pharmacist and clinical coordinator. Patient's plan of care was discussed; medications were reviewed and discharge planning was addressed. ________________________________________________________________________ Comments >50% of visit spent in counseling and coordination of care X   
________________________________________________________________________ Wille Gaucher, MD

## 2021-04-17 NOTE — PROGRESS NOTES
Pulmonary and Critical Care Progress Note Subjective:  
 
Patient seen and examined Overnight events noted Awake and alert No acute distress Remains on high flow This morning on 60 L 75% FiO2 Chest x-ray continues to show bilateral patchy infiltrates with hypoinflation Review of Systems: A comprehensive review of systems was negative except for that written in the HPI. Current Facility-Administered Medications Medication Dose Route Frequency Provider Last Rate Last Admin  hydrOXYzine pamoate (VISTARIL) capsule 25 mg  25 mg Oral QID PRN Gemma CARRASCO MD      
 enoxaparin (LOVENOX) injection 90 mg  1 mg/kg SubCUTAneous Q12H Gemma CARRASCO MD   90 mg at 04/17/21 1219  furosemide (LASIX) injection 40 mg  40 mg IntraVENous DAILY Gemma CARRASCO MD   40 mg at 04/17/21 4423  QUEtiapine (SEROquel) tablet 12.5 mg  12.5 mg Oral QHS Winnie Marinelli MD   12.5 mg at 04/16/21 2034  benzocaine-menthoL (CEPACOL) lozenge 1 Lozenge  1 Lozenge Mucous Membrane PRN Yusuf Cade MD   1 Lozenge at 04/09/21 1640  nystatin (MYCOSTATIN) 100,000 unit/mL oral suspension 500,000 Units  500,000 Units Oral QID Yusuf Cade MD   500,000 Units at 04/17/21 1219  
 insulin lispro (HUMALOG) injection 3 Units  3 Units SubCUTAneous Winnie Lorenzana MD   Stopped at 04/17/21 1630  
 dextrose (D50W) injection syrg 12.5-25 g  25-50 mL IntraVENous PRN Yusuf Cade MD      
 insulin lispro (HUMALOG) injection   SubCUTAneous AC&HS Yusuf Cade MD   Stopped at 04/17/21 1630  
 atorvastatin (LIPITOR) tablet 10 mg  10 mg Oral DAILY Roberto Martinez MD   10 mg at 04/17/21 0910  
 imipramine (TOFRANIL) tablet 50 mg  50 mg Oral QHS Roberto Martinez MD   50 mg at 04/16/21 2034  levothyroxine (SYNTHROID) tablet 88 mcg  88 mcg Oral ACB Roberto Martinez MD   88 mcg at 04/17/21 6398  [Held by provider] lisinopril-hydroCHLOROthiazide (PRINZIDE, ZESTORETIC) 10-12.5 mg per tablet 1 Tab  1 Tab Oral DAILY Gretta Purcell MD   Stopped at 04/15/21 0900  
 meloxicam (MOBIC) tablet 7.5 mg  7.5 mg Oral DAILY Gretta Purcell MD   7.5 mg at 21 5402  pantoprazole (PROTONIX) tablet 20 mg  20 mg Oral DAILY Gretta Purcell MD   20 mg at 21 5095  sertraline (ZOLOFT) tablet 100 mg  100 mg Oral DAILY Gretta Purcell MD   100 mg at 21 3137  cyclobenzaprine (FLEXERIL) tablet 10 mg  10 mg Oral TID Gretta Purcell MD   10 mg at 21 0910  
 glucose chewable tablet 16 g  4 Tab Oral PRN Gretta Purcell MD      
 dextrose (D50W) injection syrg 12.5-25 g  25-50 mL IntraVENous PRN Gretta Purcell MD      
 glucagon [facility]) injection 1 mg  1 mg IntraMUSCular PRGLEN Purcell MD      
 zinc sulfate (ZINCATE) 50 mg zinc (220 mg) capsule 1 Cap  1 Cap Oral DAILY Gretta Purcell MD   1 Cap at 21 8036  acetaminophen (TYLENOL) tablet 650 mg  650 mg Oral Q6H PRN Gretta Purcell MD   650 mg at 21 1919  
 melatonin tablet 5 mg  5 mg Oral QHS PRN Gretta Purcell MD   5 mg at 04/15/21 2158  ondansetron (ZOFRAN) injection 4 mg  4 mg IntraVENous Q4H PRN Gretta Purcell MD   4 mg at 21 2352 No Known Allergies Objective:  
 
Blood pressure 104/64, pulse 100, temperature 98.6 °F (37 °C), resp. rate 20, height 5' 4.5\" (1.638 m), weight 94.7 kg (208 lb 12.4 oz), SpO2 100 %, not currently breastfeeding. Temp (24hrs), Av.6 °F (37 °C), Min:97.9 °F (36.6 °C), Max:99 °F (37.2 °C) Intake and Output: 
Current Shift: 701 - 1900 In: -  
Out: 158 [JTFMU:880] Last 3 Shifts: 04/15 1901 -  0700 In: 300 [P.O.:300] Out: - Physical Exam:  
 
General: Lying in bed comfortably, no acute distress, on high flow nasal cannula. Eye: Reactive, symmetric Throat and Neck: Supple Lung: Reduced air entry bilaterally with prolonged exhalation but no wheezing. Occasional crackles. Currently on high flow nasal cannula. Heart: S1+S2. No murmurs Abdomen: soft, non-tender. Bowel sounds normal. No masses; obese Extremities: No edema : Not done Skin: No cyanosis Neurologic: A & O x3. Grossly nonfocal 
Psychiatric: Appropriate affect; coherent Lab/Data Review: 
 
Recent Results (from the past 24 hour(s)) GLUCOSE, POC Collection Time: 04/16/21  5:32 PM  
Result Value Ref Range Glucose (POC) 100 65 - 100 mg/dL Performed by Gavino Frias   
GLUCOSE, POC Collection Time: 04/16/21  8:53 PM  
Result Value Ref Range Glucose (POC) 116 (H) 65 - 100 mg/dL Performed by Annita Bay GLUCOSE, POC Collection Time: 04/17/21  8:34 AM  
Result Value Ref Range Glucose (POC) 94 65 - 100 mg/dL Performed by Judith Orourke SARS-COV-2 Collection Time: 04/17/21  9:45 AM  
Result Value Ref Range SARS-CoV-2 Please find results under separate order METABOLIC PANEL, COMPREHENSIVE Collection Time: 04/17/21  9:55 AM  
Result Value Ref Range Sodium 135 (L) 136 - 145 mmol/L Potassium 3.6 3.5 - 5.1 mmol/L Chloride 100 97 - 108 mmol/L  
 CO2 27 21 - 32 mmol/L Anion gap 8 5 - 15 mmol/L Glucose 104 (H) 65 - 100 mg/dL BUN 24 (H) 6 - 20 mg/dL Creatinine 1.01 0.55 - 1.02 mg/dL BUN/Creatinine ratio 24 (H) 12 - 20 GFR est AA >60 >60 ml/min/1.73m2 GFR est non-AA 54 (L) >60 ml/min/1.73m2 Calcium 8.9 8.5 - 10.1 mg/dL Bilirubin, total 0.5 0.2 - 1.0 mg/dL AST (SGOT) 44 (H) 15 - 37 U/L  
 ALT (SGPT) 28 12 - 78 U/L Alk. phosphatase 91 45 - 117 U/L Protein, total 6.4 6.4 - 8.2 g/dL Albumin 2.9 (L) 3.5 - 5.0 g/dL Globulin 3.5 2.0 - 4.0 g/dL A-G Ratio 0.8 (L) 1.1 - 2.2    
CBC WITH AUTOMATED DIFF Collection Time: 04/17/21  9:55 AM  
Result Value Ref Range WBC 11.0 3.6 - 11.0 K/uL  
 RBC 4.64 3.80 - 5.20 M/uL  
 HGB 13.5 11.5 - 16.0 g/dL HCT 41.0 35.0 - 47.0 %  MCV 88.4 80.0 - 99.0 FL  
 MCH 29.1 26.0 - 34.0 PG  
 MCHC 32.9 30.0 - 36.5 g/dL  
 RDW 17.4 (H) 11.5 - 14.5 % PLATELET 994 457 - 765 K/uL MPV 11.1 8.9 - 12.9 FL  
 NEUTROPHILS 85 (H) 32 - 75 % LYMPHOCYTES 6 (L) 12 - 49 % MONOCYTES 4 (L) 5 - 13 % EOSINOPHILS 4 0 - 7 % BASOPHILS 0 0 - 1 % IMMATURE GRANULOCYTES 1 (H) 0.0 - 0.5 % ABS. NEUTROPHILS 9.5 (H) 1.8 - 8.0 K/UL  
 ABS. LYMPHOCYTES 0.7 (L) 0.8 - 3.5 K/UL  
 ABS. MONOCYTES 0.4 0.0 - 1.0 K/UL  
 ABS. EOSINOPHILS 0.4 0.0 - 0.4 K/UL  
 ABS. BASOPHILS 0.0 0.0 - 0.1 K/UL  
 ABS. IMM. GRANS. 0.1 (H) 0.00 - 0.04 K/UL  
 DF AUTOMATED    
GLUCOSE, POC Collection Time: 04/17/21 11:13 AM  
Result Value Ref Range Glucose (POC) 184 (H) 65 - 100 mg/dL Performed by Blue Zambrano GLUCOSE, POC Collection Time: 04/17/21  4:23 PM  
Result Value Ref Range Glucose (POC) 99 65 - 100 mg/dL Performed by Anni Gamble   
 
 
XR CHEST PORT Final Result Increased moderate diffuse bilateral airspace opacities, likely a combination of  
edema and airspace disease. Developing ARDS not excluded. XR CHEST PORT Final Result XR CHEST PORT Final Result XR CHEST PORT Final Result XR CHEST PORT Final Result There is persistent airspace disease within the mid to lower lung  
zone predominance. This represents a heterogeneous pattern as might be  
associated with an atypical pneumonia. There has been little interval change. XR CHEST PORT Final Result XR CHEST PORT Final Result XR CHEST PORT Final Result No significant interval change. XR CHEST PORT Final Result CT Results  (Last 48 hours) None Assessment: 1. Acute respiratory failure with hypoxia 2. COVID-19 pneumonia 3. Shortness of breath 4.  Cough 5. Generalized weakness 6. Acute kidney injury 7. Obstructive sleep apnea on CPAP 
8. Obesity Plan:  
 
Currently on high flow nasal cannula 50 L,85%.  
Decreased to 75% Will use oxygen supplementation as needed to keep saturation above 90% Patient has acute respiratory failure with hypoxia due to COVID-19 pneumonia Status post 5 days of remdesivir Currently on Decadron 6 mg IV every 8 hours, if she improves we will reduce the frequency of this medication. Off azithromycin per ID Status post tocilizumab on 4/5/2021 Vitamin C and zinc 
On therapeutic dose of Lovenox Chest x-ray with persistent bilateral infiltrates and hypoinflation Showed normal LVEF Renal function stable today. Monitor renal function with fluid changes Check electrolytes and replace as needed CPAP at night DVT and GI prophylaxis Questions of patient were answered at bedside in detail Case discussed in detail with RN, RT, and care team 
Thank you for involving me in the care of this patient I will follow with you closely during hospitalization Greater than 30 minutes were spent in direct care with this patient. Bobette Halsted, MD 
[facility] 4/17/2021

## 2021-04-18 NOTE — PROGRESS NOTES
Hospitalist Progress Note Daily Progress Note: 4/18/2021 79-year-old female with acute hypoxic respiratory failure secondary to Covid pneumonia. Subjective: The patient is seen for follow  up. She is seen in the ICU now, moved there earlier today as she was hypoxic and increasingly fatigued. When I saw her in the ICU she was lucid, looks much better now. Tolerating CPAP at 100% FiO2, saturating at 100%. Partially removed mask to understand her speech and she maintained saturation greater than 96%. Transitioned her to high flow nasal cannula at 60% and so far she is tolerating. Problem List: 
Problem List as of 4/18/2021 Date Reviewed: 11/21/2017 Codes Class Noted - Resolved Respiratory failure ([facility] [address] ICD-10-CM: J96.90 ICD-9-CM: 518.81  4/4/2021 - Present Ductal carcinoma in situ (DCIS) of left breast ICD-10-CM: D05.12 
ICD-9-CM: 233.0  11/21/2017 - Present Overview Addendum 11/21/2017  1:39 PM by Aurora Peres MD  
  11/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 PA 6% Medications reviewed Current Facility-Administered Medications Medication Dose Route Frequency  hydrOXYzine pamoate (VISTARIL) capsule 25 mg  25 mg Oral QID PRN  piperacillin-tazobactam (ZOSYN) 3.375 g in 0.9% sodium chloride (MBP/ADV) 100 mL MBP  3.375 g IntraVENous Q8H  
 enoxaparin (LOVENOX) injection 90 mg  1 mg/kg SubCUTAneous Q12H  furosemide (LASIX) injection 40 mg  40 mg IntraVENous DAILY  QUEtiapine (SEROquel) tablet 12.5 mg  12.5 mg Oral QHS  benzocaine-menthoL (CEPACOL) lozenge 1 Lozenge  1 Lozenge Mucous Membrane PRN  
 nystatin (MYCOSTATIN) 100,000 unit/mL oral suspension 500,000 Units  500,000 Units Oral QID  dextrose (D50W) injection syrg 12.5-25 g  25-50 mL IntraVENous PRN  
 insulin lispro (HUMALOG) injection   SubCUTAneous AC&HS  atorvastatin (LIPITOR) tablet 10 mg  10 mg Oral DAILY  imipramine (TOFRANIL) tablet 50 mg  50 mg Oral QHS  levothyroxine (SYNTHROID) tablet 88 mcg  88 mcg Oral ACB  [Held by provider] lisinopril-hydroCHLOROthiazide (PRINZIDE, ZESTORETIC) 10-12.5 mg per tablet 1 Tab  1 Tab Oral DAILY  meloxicam (MOBIC) tablet 7.5 mg  7.5 mg Oral DAILY  pantoprazole (PROTONIX) tablet 20 mg  20 mg Oral DAILY  sertraline (ZOLOFT) tablet 100 mg  100 mg Oral DAILY  cyclobenzaprine (FLEXERIL) tablet 10 mg  10 mg Oral TID  glucose chewable tablet 16 g  4 Tab Oral PRN  
 dextrose (D50W) injection syrg 12.5-25 g  25-50 mL IntraVENous PRN  
 glucagon (GLUCAGEN) injection 1 mg  1 mg IntraMUSCular PRN  zinc sulfate (ZINCATE) 50 mg zinc (220 mg) capsule 1 Cap  1 Cap Oral DAILY  acetaminophen (TYLENOL) tablet 650 mg  650 mg Oral Q6H PRN  
 melatonin tablet 5 mg  5 mg Oral QHS PRN  
 ondansetron (ZOFRAN) injection 4 mg  4 mg IntraVENous Q4H PRN Review of Systems:  
Review of Systems Constitutional: Positive for malaise/fatigue. Negative for chills and fever. HENT: Negative. Eyes: Negative. Respiratory: Positive for cough, sputum production and shortness of breath. Negative for hemoptysis and wheezing. Cardiovascular: Negative. Gastrointestinal: Negative. Genitourinary: Negative. Musculoskeletal: Negative. Skin: Negative. Neurological: Positive for weakness. Negative for dizziness, focal weakness and headaches. Endo/Heme/Allergies: Negative. Psychiatric/Behavioral: Negative. Objective:  
Physical Exam:  
 
Visit Vitals /75 (BP 1 Location: Right upper arm, BP Patient Position: At rest) Pulse 98 Temp 98.7 °F (37.1 °C) Resp 18 Ht 5' 4.5\" (1.638 m) Wt 94.7 kg (208 lb 12.4 oz) SpO2 96% Breastfeeding No  
BMI 35.28 kg/m² O2 Flow Rate (L/min): 60 l/min O2 Device: Hi flow nasal cannula Temp (24hrs), Av.3 °F (36.8 °C), Min:97.8 °F (36.6 °C), Max:98.7 °F (37.1 °C) No intake/output data recorded. 1901 -  0700 In: 300 [P.O.:300] Out: 600 Petra Jake General:          Patient appears comfortable and is speaking clearly thru cpap mask- difficult though to understand. Removed mask for a moment partially, tolerated. EENT:              EOMI. Anicteric sclerae. MMM Resp:               Decreased air entry bilaterally + bilateral bibasilar crackles CV:                  Regular  rhythm, S1 plus S2, no murmurs rubs or gallops  No edema GI:                   Soft, Non distended, Non tender.  +Bowel sounds Neurologic:       Alert and oriented X 3, normal speech, Psych:   Good insight. Not anxious nor agitated Skin:                No rashes. No jaundice. IV infiltrated rt forearm. Data Review:  
   
Recent Days: 
Recent Labs 04/17/21 
0147 WBC 11.0 HGB 13.5 HCT 41.0  
 Recent Labs 04/18/21 
1043 04/17/21 
0955 04/16/21 
0517  135* 135* K 3.3* 3.6 3.7  100 104 CO2 29 27 24 * 104* 94 BUN 23* 24* 29* CREA 1.19* 1.01 0.98  
CA 8.6 8.9 8.6 ALB 2.6* 2.9* 2.4* TBILI 0.5 0.5 0.5 ALT 27 28 30 Recent Labs 04/17/21 
1800 PH 7.45  
PCO2 42 PO2 85 HCO3 29* FIO2 100.0  
 
 
24 Hour Results: 
Recent Results (from the past 24 hour(s)) GLUCOSE, POC Collection Time: 04/17/21  7:35 PM  
Result Value Ref Range Glucose (POC) 127 (H) 65 - 100 mg/dL Performed by Luis Richmond GLUCOSE, POC Collection Time: 04/18/21  7:56 AM  
Result Value Ref Range Glucose (POC) 121 (H) 65 - 100 mg/dL Performed by Silverio [facility] Collection Time: 04/18/21 10:43 AM  
Result Value Ref Range Sodium 137 136 - 145 mmol/L Potassium 3.3 (L) 3.5 - 5.1 mmol/L Chloride 100 97 - 108 mmol/L  
 CO2 29 21 - 32 mmol/L Anion gap 8 5 - 15 mmol/L Glucose 141 (H) 65 - 100 mg/dL BUN 23 (H) 6 - 20 mg/dL Creatinine 1.19 (H) 0.55 - 1.02 mg/dL BUN/Creatinine ratio 19 12 - 20 GFR est AA 54 (L) >60 ml/min/1.73m2  GFR est non-AA 44 (L) >60 ml/min/1.73m2 Calcium 8.6 8.5 - 10.1 mg/dL Bilirubin, total 0.5 0.2 - 1.0 mg/dL AST (SGOT) 37 15 - 37 U/L  
 ALT (SGPT) 27 12 - 78 U/L Alk. phosphatase 82 45 - 117 U/L Protein, total 6.0 (L) 6.4 - 8.2 g/dL Albumin 2.6 (L) 3.5 - 5.0 g/dL Globulin 3.4 2.0 - 4.0 g/dL A-G Ratio 0.8 (L) 1.1 - 2.2 GLUCOSE, POC Collection Time: 04/18/21 11:47 AM  
Result Value Ref Range Glucose (POC) 143 (H) 65 - 100 mg/dL Performed by Yue Oliver, POC Collection Time: 04/18/21  5:35 PM  
Result Value Ref Range Glucose (POC) 141 (H) 65 - 100 mg/dL Performed by Tucker Hernandez Assessment/  
 
Patient Active Problem List  
Diagnosis Code  Ductal carcinoma in situ (DCIS) of left breast D05.12  Respiratory failure ([facility] [address] J96.90 Acute respiratory failure with hypoxia secondary to COVID-19 pna:  
-patient presented with acute respiratory failure with hypoxia requiring high flow nasal cannula for ventilatory support and based on patient's clinical presentation secondary to COVID-19 pneumonia, patient does not meet sepsis criteria at this time Completed Redemsivir Continue Decadron 6 mg IV every 8 Completed  azithromycin Continue to monitor respiratory status, moved to icu 4/18 am 2/2 fatigue, hypoxia on cpap, in icu appears more settled down, lucid and olerated transition to hfnc. Will continue to monitor in icu- part of the problem is that patient pulled o2 supplements off multiple times with transient episodes of decompensation. More lucid and not pulling off mask over the past 48 hrs. Pulmonology consult appreciated, Infectious disease consult appreciated Transitioned back to hfnc 4/18 and tolerated so far. cxr persistent infiltrates +/- edema- lasix resumed 4/16 D Dimer increased, inc lovenox to 1mg/kg bid. I 
  
Hypertension hx Profoundly hypotensive am 4/14- responded to fluids 
bp rx and lasix held, bp subsequently & resumed lasix as appeared more labored and suspecting edema component Judiciously hydrate. Bp has remained adequate since resuming lasix. 
  
Hypothyroidism 
continue Synthroid 
  
Gastroesophageal reflux disease 
continue Protonix once daily 
  
Hyperglycemia due to type 2 diabetes  
likely iatrogenic/steroids, currently controlled 
continue lispro tid according to 24 hour coverage Continue insulin sliding scale 
  
Depression/anxietycontinue home Zoloft 
  
Hx breast ca/meningioma. 
  
ProphylaxisLovenox FENcardiac diet, replete potassium and magnesium Full code, POA is daughter Andrew Cazares [phone], updated. Dispositionpending course, remains on cpap.  
  
  
25.0 - 29.9 Overweight / Body mass index is 35.28 kg/m². Care Plan discussed with: Patient/Family, Nurse,  and Consultant . Total time spent with patient: 30 minutes. This dictation was done by dragon, computer voice recognition software. Often unanticipated grammatical, syntax, phones and other interpretive errors are inadvertently transcribed. Please excuse errors that have escaped final proofreading.  
 
Bette Ackerman MD

## 2021-04-18 NOTE — PROGRESS NOTES
Infectious Disease Progress Note Subjective:  
Transferred to the ICU earlier today due to hypoxia, and increased lethargy Moderate b/l airspace disease on yesterday's CXR. Afebrile and hemodynamically stable. Objective:  
Physical Exam:  
 
Visit Vitals /75 (BP 1 Location: Right upper arm, BP Patient Position: At rest) Pulse (!) 102 Temp 97.4 °F (36.3 °C) Resp 18 Ht 5' 4.5\" (1.638 m) Wt 208 lb 12.4 oz (94.7 kg) SpO2 93% Breastfeeding No  
BMI 35.28 kg/m² O2 Flow Rate (L/min): 60 l/min O2 Device: Hi flow nasal cannula Temp (24hrs), Av.1 °F (36.7 °C), Min:97.4 °F (36.3 °C), Max:98.7 °F (37.1 °C) No intake/output data recorded.  1901 -  0700 In: 300 [P.O.:300] Out: 600 [Urine:600] General: NAD, lethargic, confused HEENT: MICHELLE, Moist mucosa, on high flow Lungs: CTA b/l, no wheeze/rhonchi Heart: S1S2+, RRR, no murmur Abdo: Soft, NT, ND, +BS Exts: Trace edema, + pulses b/l  
Skin: No wounds, No rashes or lesions Data Review:  
   
Recent Days: 
Recent Labs 21 
2321 WBC 11.0 HGB 13.5 HCT 41.0  
 Recent Labs 21 
1043 21 
0955 21 
9119 BUN 23* 24* 29* CREA 1.19* 1.01 0.98 Lab Results Component Value Date/Time C-Reactive protein 1.69 (H) 2021 08:53 AM  
  
Microbiology: None Diagnostics CXR Results  (Last 48 hours) 21 0821  XR CHEST PORT Final result Impression:  Increased moderate diffuse bilateral airspace opacities, likely a combination of  
edema and airspace disease. Developing ARDS not excluded. Narrative:  AP portable chest, 0753 hours. Comparison: 4/15/2021.   
   
Findings: Multiple cardiac monitoring leads overlie the chest. The heart size is  
at the upper limits of normal. There are moderate diffuse bilateral airspace  
opacities, slightly increased from the prior exam. No pleural effusion or  
pneumothorax is identified. There are senescent changes within the spine. Assessment/Plan 1. COVID-19 pneumonitis, Ongoing infiltrates/edema on CXR (04/17), suspected ARDS Repeat test for COVID-19 neg on 04/17 S/p Remdesivir and a dose of Tocilizumab, off steroid therapy Afebrile, WBC of 11K on most recent labs (04/17) On day # 2 of Zosyn for suspected aspiration PNA Routine labs and CXR in the morning. Continue to wean off O2 as tolerated, resume steroid therapy 2. Acute hypoxia due to # 1, still requiring sig amounts of O2 
 
3. Acute on chronic renal failure: Will continue to monitor 4. Intermittent confusion: Follows commands appropriately Stephanie Sierra MD 
 
4/18/2021

## 2021-04-18 NOTE — PROGRESS NOTES
Patient received as upgrade from room 525. Patient alert and oriented, on cpap 100% o2 sat 97%. Patient appears mildly short of breath with movement otherwise appears comfortable. CHG bath given. Patient on bedside monitor, all VSS, midline flushed and patent, offering no complaints at this time. Four eye skin check completed with Gladis GRIFFIN. Noted to have excoriation under both breaths, blanchable redness to sacrum, heels reddened and boggy. No open wounds or sores noted.

## 2021-04-18 NOTE — INTERDISCIPLINARY ROUNDS
Patient transferred to ICU. Daughter notified. Report called to Lieutenant Linares in ICU. Belongings and chart taken with patient. Patient is on 100% bipap. Awake, alert, and oriented.

## 2021-04-18 NOTE — PROGRESS NOTES
PT orders received, PT evaluation attempted at 1055. Per RN, Pt is not appropriate for therapy at this time. Per chart review, Pt transferred to ICU and will need new PT eval orders when medically appropriate. Thank you.

## 2021-04-18 NOTE — PROGRESS NOTES
Per chart review, pt transferred to ICU thus will need new OT eval orders when pt is medically appropriate for therapy. Thank you.

## 2021-04-18 NOTE — PROGRESS NOTES
Pulmonary and Critical Care Progress Note Subjective:  
 
Patient seen and examined Overnight events noted Patient got transferred to ICU as she dropped her oxygen saturations on the floor. She reported she was getting fatigued. Had in ICU she was placed on CPAP. She is saturating well on CPAP. We will give another try to put her on high flow nasal cannula oxygen, on 60 L 75% FiO2 Awake and alert No acute distress Remains on high flow Chest x-ray continues to show bilateral patchy infiltrates with hypoinflation Review of Systems: 
Limited review of system because patient is having difficulty to communicate with her CPAP. Current Facility-Administered Medications Medication Dose Route Frequency Provider Last Rate Last Admin  hydrOXYzine pamoate (VISTARIL) capsule 25 mg  25 mg Oral QID PRN Maritza CARRASCO MD   25 mg at 04/18/21 6732  piperacillin-tazobactam (ZOSYN) 3.375 g in 0.9% sodium chloride (MBP/ADV) 100 mL MBP  3.375 g IntraVENous Q8H Chelsea Fajardo MD 25 mL/hr at 04/18/21 0947 3.375 g at 04/18/21 2495  enoxaparin (LOVENOX) injection 90 mg  1 mg/kg SubCUTAneous Q12H Maritza CARRASCO MD   90 mg at 04/18/21 1150  furosemide (LASIX) injection 40 mg  40 mg IntraVENous DAILY Maritza CARRASCO MD   40 mg at 04/18/21 5082  QUEtiapine (SEROquel) tablet 12.5 mg  12.5 mg Oral QHS Winnie Marinelli MD   12.5 mg at 04/17/21 2021  
 benzocaine-menthoL (CEPACOL) lozenge 1 Lozenge  1 Lozenge Mucous Membrane PRN Uma Rg MD   1 Lozenge at 04/09/21 1640  nystatin (MYCOSTATIN) 100,000 unit/mL oral suspension 500,000 Units  500,000 Units Oral QID Uma Rg MD   Stopped at 04/18/21 1300  
 dextrose (D50W) injection syrg 12.5-25 g  25-50 mL IntraVENous PRN Uma Rg MD      
 insulin lispro (HUMALOG) injection   SubCUTAneous AC&HS Uma Rg MD   2 Units at 04/18/21 1151  atorvastatin (LIPITOR) tablet 10 mg  10 mg Oral DAILY Julio Hess MD   10 mg at 21 0849  
 imipramine (TOFRANIL) tablet 50 mg  50 mg Oral QHS Julio Hess MD   50 mg at 21  levothyroxine (SYNTHROID) tablet 88 mcg  88 mcg Oral ACB Julio Hess MD   88 mcg at 21 0849  
 [Held by provider] lisinopril-hydroCHLOROthiazide (PRINZIDE, ZESTORETIC) 10-12.5 mg per tablet 1 Tab  1 Tab Oral DAILY Julio Hess MD   Stopped at 04/15/21 0900  
 meloxicam (MOBIC) tablet 7.5 mg  7.5 mg Oral DAILY Julio Hess MD   7.5 mg at 21 0849  
 pantoprazole (PROTONIX) tablet 20 mg  20 mg Oral DAILY Julio Hess MD   20 mg at 21 4156  sertraline (ZOLOFT) tablet 100 mg  100 mg Oral DAILY Julio Hess MD   100 mg at 21 6563  cyclobenzaprine (FLEXERIL) tablet 10 mg  10 mg Oral TID Julio Hess MD   10 mg at 21 0849  
 glucose chewable tablet 16 g  4 Tab Oral PRN Julio Hess MD      
 dextrose (D50W) injection syrg 12.5-25 g  25-50 mL IntraVENous PRN Julio Hess MD      
 glucagon [facility]) injection 1 mg  1 mg IntraMUSCular PRN Julio Hess MD      
 zinc sulfate (ZINCATE) 50 mg zinc (220 mg) capsule 1 Cap  1 Cap Oral DAILY Julio Hess MD   1 Cap at 21 2296  acetaminophen (TYLENOL) tablet 650 mg  650 mg Oral Q6H PRN Julio Hess MD   650 mg at 21 7617  melatonin tablet 5 mg  5 mg Oral QHS PRN Julio Hess MD   5 mg at 04/15/21 2158  ondansetron (ZOFRAN) injection 4 mg  4 mg IntraVENous Q4H PRN Julio Hess MD   4 mg at 21 9702 No Known Allergies Objective:  
 
Blood pressure 117/75, pulse 98, temperature 98.7 °F (37.1 °C), resp. rate 18, height 5' 4.5\" (1.638 m), weight 94.7 kg (208 lb 12.4 oz), SpO2 99 %, not currently breastfeeding. Temp (24hrs), Av.3 °F (36.8 °C), Min:97.8 °F (36.6 °C), Max:98.7 °F (37.1 °C) Intake and Output: 
Current Shift: No intake/output data recorded. Last 3 Shifts: 04/16 1901 - 04/18 0700 In: 300 [P.O.:300] Out: 600 [Urine:600] Physical Exam:  
 
General: Lying in bed comfortably, no acute distress, on CPAP at 80% with pressure of 14 Eye: Reactive, symmetric Throat and Neck: Supple Lung: Reduced air entry bilaterally with prolonged exhalation but no wheezing. Occasional crackles. Currently on high flow nasal cannula. Heart: S1+S2. No murmurs Abdomen: soft, non-tender. Bowel sounds normal. No masses; obese Extremities: No edema : Not done Skin: No cyanosis Neurologic: A & O x3. Grossly nonfocal 
Psychiatric: Appropriate affect; coherent Lab/Data Review: 
 
Recent Results (from the past 24 hour(s)) BLOOD GAS, ARTERIAL Collection Time: 04/17/21  6:00 PM  
Result Value Ref Range pH 7.45 7.35 - 7.45    
 PCO2 42 35 - 45 mmHg PO2 85 75 - 100 mmHg O2 SAT 97 >95 % BICARBONATE 29 (H) 22 - 26 mmol/L  
 BASE EXCESS 4.7 (H) 0 - 2 mmol/L  
 O2 METHOD CPAP    
 FIO2 100.0 % EPAP/CPAP/PEEP 10 SITE Left Radial    
 JACQUELIN'S TEST PASS    
GLUCOSE, POC Collection Time: 04/17/21  7:35 PM  
Result Value Ref Range Glucose (POC) 127 (H) 65 - 100 mg/dL Performed by Jamilah Maurice GLUCOSE, POC Collection Time: 04/18/21  7:56 AM  
Result Value Ref Range Glucose (POC) 121 (H) 65 - 100 mg/dL Performed by Nereyda Postal Collection Time: 04/18/21 10:43 AM  
Result Value Ref Range Sodium 137 136 - 145 mmol/L Potassium 3.3 (L) 3.5 - 5.1 mmol/L Chloride 100 97 - 108 mmol/L  
 CO2 29 21 - 32 mmol/L Anion gap 8 5 - 15 mmol/L Glucose 141 (H) 65 - 100 mg/dL BUN 23 (H) 6 - 20 mg/dL Creatinine 1.19 (H) 0.55 - 1.02 mg/dL BUN/Creatinine ratio 19 12 - 20 GFR est AA 54 (L) >60 ml/min/1.73m2 GFR est non-AA 44 (L) >60 ml/min/1.73m2 Calcium 8.6 8.5 - 10.1 mg/dL Bilirubin, total 0.5 0.2 - 1.0 mg/dL  AST (SGOT) 37 15 - 37 U/L  
 ALT (SGPT) 27 12 - 78 U/L Alk. phosphatase 82 45 - 117 U/L Protein, total 6.0 (L) 6.4 - 8.2 g/dL Albumin 2.6 (L) 3.5 - 5.0 g/dL Globulin 3.4 2.0 - 4.0 g/dL A-G Ratio 0.8 (L) 1.1 - 2.2 GLUCOSE, POC Collection Time: 04/18/21 11:47 AM  
Result Value Ref Range Glucose (POC) 143 (H) 65 - 100 mg/dL Performed by Milan Gary   
 
 
XR CHEST PORT Final Result Increased moderate diffuse bilateral airspace opacities, likely a combination of  
edema and airspace disease. Developing ARDS not excluded. XR CHEST PORT Final Result XR CHEST PORT Final Result XR CHEST PORT Final Result XR CHEST PORT Final Result There is persistent airspace disease within the mid to lower lung  
zone predominance. This represents a heterogeneous pattern as might be  
associated with an atypical pneumonia. There has been little interval change. XR CHEST PORT Final Result XR CHEST PORT Final Result XR CHEST PORT Final Result No significant interval change. XR CHEST PORT Final Result CT Results  (Last 48 hours) None Assessment: 1. Acute respiratory failure with hypoxia 2. COVID-19 pneumonia 3. Shortness of breath 4.  Cough 5. Generalized weakness 6. Acute kidney injury 7. Obstructive sleep apnea on CPAP 
8. Obesity Plan:  
 
Transferred to ICU as she was getting fatigued and was dropping her sats Currently on CPAP at 14 CWP 80% FiO2 Since she is in ICU she looks comfortable saturations have been 98 to 99% We will again try her on high flow nasal cannula oxygen 60 L with 80% We will repeat blood gas in the morning. Will use oxygen supplementation as needed to keep saturation above 90% Patient has acute respiratory failure with hypoxia due to COVID-19 pneumonia Status post 5 days of remdesivir Currently on Decadron 6 mg IV every 8 hours, if she improves we will reduce the frequency of this medication.  
Off azithromycin per ID Status post tocilizumab on 4/5/2021 Vitamin C and zinc 
On therapeutic dose of Lovenox Chest x-ray with persistent bilateral infiltrates and hypoinflation Showed normal LVEF Renal function stable today. Monitor renal function with fluid changes Check electrolytes and replace as needed CPAP at night DVT and GI prophylaxis Questions of patient were answered at bedside in detail Case discussed in detail with RN, RT, and care team 
Thank you for involving me in the care of this patient I will follow with you closely during hospitalization Greater than 45 minutes were spent in direct care with this patient. Ani Connolly MD 
[facility] 4/18/2021

## 2021-04-19 NOTE — PROGRESS NOTES
Assumed care of pt at 0700. On assessment, HFNC pulled off and o2sat was 68%, pt moaning/obtunded, HR in 130's, RR 40's. HFNC replaced at 100% fio2, called RT. Pt very restless and agitated, unable to calm. One time dose IV ativan ordered by MD & given. Pt less agitated at this time, HR still 120-130, RR 30's & o2sat 90-93%.

## 2021-04-19 NOTE — PROGRESS NOTES
Infectious Disease Progress Note Subjective:  
Remains in the ICU, respiratory distress this morning, now on BIPAP. Hemodynamically stable off pressor support Objective:  
Physical Exam:  
 
Visit Vitals BP 92/70 Pulse (!) 110 Temp 98.3 °F (36.8 °C) Resp 24 Ht 5' 4.5\" (1.638 m) Wt 207 lb 3.7 oz (94 kg) SpO2 98% Breastfeeding No  
BMI 35.02 kg/m² O2 Flow Rate (L/min): 60 l/min O2 Device: BIPAP Temp (24hrs), Av.3 °F (36.8 °C), Min:97.4 °F (36.3 °C), Max:98.6 °F (37 °C) No intake/output data recorded.  1901 -  0700 In: 100 [I.V.:100] Out: 350 [Urine:350] General: NAD, lethargic, confused HEENT: MICHELLE, Moist mucosa, on high flow Lungs: CTA b/l, no wheeze/rhonchi Heart: S1S2+, RRR, no murmur Abdo: Soft, NT, ND, +BS Exts: Trace edema, + pulses b/l  
Skin: No wounds, No rashes or lesions Data Review:  
   
Recent Days: 
Recent Labs 21 
0945 21 
7143 WBC 12.4* 11.0 HGB 12.9 13.5 HCT 38.6 41.0  
 195 Recent Labs 21 
0945 21 
1043 21 
2097 BUN 23* 23* 24* CREA 1.23* 1.19* 1.01 Lab Results Component Value Date/Time C-Reactive protein 1.69 (H) 2021 08:53 AM  
  
Microbiology: None Diagnostics CXR Results  (Last 48 hours) 21 0345  XR CHEST PORT Final result Impression:  No significant interval change. Narrative:  Chest, frontal view, 2021 History: Respiratory failure. Comparison: Including chest 2021. Findings: The cardiac silhouette is stable. Rightward tracheal deviation is  
stable. The lungs remain underexpanded. Diffuse opacities in the lungs are not  
significantly changed. No pleural effusion or pneumothorax is identified. The  
osseous structures are stable. Assessment/Plan 1. COVID-19 pneumonitis, Ongoing infiltrates/edema on CXR (), suspected ARDS     Repeat test for COVID-19 neg on 04/17. S/p Remdesivir and a dose of Tocilizumab Afebrile, mild rise in WBC on todays labs, may be from steroid therapy Off high flow, currently on BIPAP at 100% FIO2 On day # 3 of Zosyn for suspected aspiration PNA, decadron resumed on 04/18 CBC, CRP and Procal in the morning. Continue supportive care 2. Acute hypoxia due to # 1, On BIPAP as above 3. Acute on chronic renal failure: Will continue to monitor closely 4. Intermittent confusion: Not following commands on BIPAP Gordon Rubin MD  
 
4/19/2021

## 2021-04-19 NOTE — PROGRESS NOTES
Pulmonary and Critical Care Progress Note Subjective:  
 
Patient seen and examined Overnight events noted Patient got transferred to ICU as she dropped her oxygen saturations on the floor. She reported she was getting fatigued. ICU she was placed on CPAP. Discussed with the RN at the bedside. Apparently she became very agitated on 60% high flow oxygen. She ripped it off and desaturated. She was then given Ativan 1 mg and was placed on BiPAP AVAPS mode, 500 tidal volume, 100% oxygen. She is now sleeping soundly. However remains relatively tachypneic and tachycardiac. Chest x-ray continues to show bilateral patchy infiltrates with hypoinflation Review of Systems: 
Limited review of system because patient is having difficulty to communicate with her CPAP. Current Facility-Administered Medications Medication Dose Route Frequency Provider Last Rate Last Admin  dexamethasone (DECADRON) 4 mg/mL injection 6 mg  6 mg IntraVENous Q24H Alek Cummings MD   6 mg at 04/18/21 2230  hydrOXYzine pamoate (VISTARIL) capsule 25 mg  25 mg Oral QID PRN Alvaro CARRASCO MD   25 mg at 04/18/21 4731  piperacillin-tazobactam (ZOSYN) 3.375 g in 0.9% sodium chloride (MBP/ADV) 100 mL MBP  3.375 g IntraVENous Q8H Chelsea Fajardo MD 25 mL/hr at 04/19/21 0948 3.375 g at 04/19/21 7451  enoxaparin (LOVENOX) injection 90 mg  1 mg/kg SubCUTAneous Q12H Alvaro CARRASCO MD   90 mg at 04/19/21 1106  furosemide (LASIX) injection 40 mg  40 mg IntraVENous DAILY Alvaro CARRASCO MD   40 mg at 04/19/21 0768  QUEtiapine (SEROquel) tablet 12.5 mg  12.5 mg Oral QHS Winnie Marinelli MD   12.5 mg at 04/18/21 2234  benzocaine-menthoL (CEPACOL) lozenge 1 Lozenge  1 Lozenge Mucous Membrane PRN Osmany Alfaro MD   1 Lozenge at 04/09/21 1640  nystatin (MYCOSTATIN) 100,000 unit/mL oral suspension 500,000 Units  500,000 Units Oral QID Osmany Alfaro MD   Stopped at 04/19/21 0900  
 dextrose (D50W) injection syrg 12.5-25 g  25-50 mL IntraVENous PRN Jose Avalos MD      
 insulin lispro (HUMALOG) injection   SubCUTAneous AC&HS Jose Avalos MD   Stopped at 04/18/21 2200  
 atorvastatin (LIPITOR) tablet 10 mg  10 mg Oral DAILY Ibrahima Mae MD   Stopped at 04/19/21 0900  
 imipramine (TOFRANIL) tablet 50 mg  50 mg Oral QHS Ibrahima Mae MD   50 mg at 04/18/21 2233  levothyroxine (SYNTHROID) tablet 88 mcg  88 mcg Oral ACB Ibrahima Mae MD   Stopped at 04/19/21 0730  [Held by provider] lisinopril-hydroCHLOROthiazide (PRINZIDE, ZESTORETIC) 10-12.5 mg per tablet 1 Tab  1 Tab Oral DAILY Ibrahima Mae MD   Stopped at 04/15/21 0900  
 meloxicam (MOBIC) tablet 7.5 mg  7.5 mg Oral DAILY Ibrahima Mae MD   Stopped at 04/19/21 0900  pantoprazole (PROTONIX) tablet 20 mg  20 mg Oral DAILY Ibrahima Mae MD   Stopped at 04/19/21 0900  sertraline (ZOLOFT) tablet 100 mg  100 mg Oral DAILY Ibrahima Mae MD   Stopped at 04/19/21 0900  cyclobenzaprine (FLEXERIL) tablet 10 mg  10 mg Oral TID Ibrahima Mae MD   Stopped at 04/19/21 0900  
 glucose chewable tablet 16 g  4 Tab Oral PRN Ibrahima Mae MD      
 dextrose (D50W) injection syrg 12.5-25 g  25-50 mL IntraVENous PRN Ibrahima Mae MD      
 glucagon [facility]) injection 1 mg  1 mg IntraMUSCular PRN Ibrahima Mae MD      
 zinc sulfate (ZINCATE) 50 mg zinc (220 mg) capsule 1 Cap  1 Cap Oral DAILY Ibrahima Mae MD   Stopped at 04/19/21 0900  acetaminophen (TYLENOL) tablet 650 mg  650 mg Oral Q6H PRN Ibrahima Mae MD   650 mg at 04/18/21 8719  melatonin tablet 5 mg  5 mg Oral QHS PRN Ibrahima Mae MD   5 mg at 04/15/21 2818  ondansetron (ZOFRAN) injection 4 mg  4 mg IntraVENous Q4H PRN Ibrahima Mae MD   4 mg at 04/08/21 2189 No Known Allergies Objective:  
 
Blood pressure 101/73, pulse (!) 120, temperature 98.3 °F (36.8 °C), resp. rate 24, height 5' 4.5\" (1.638 m), weight 94 kg (207 lb 3.7 oz), SpO2 98 %, not currently breastfeeding. Temp (24hrs), Av.4 °F (36.9 °C), Min:97.4 °F (36.3 °C), Max:98.7 °F (37.1 °C) Intake and Output: 
Current Shift: No intake/output data recorded. Last 3 Shifts:  190 -  0700 In: 100 [I.V.:100] Out: 350 [Urine:350] Physical Exam:  
 
General: Lying in bed comfortably, no acute distress, on BiPAP and very somnolent. Eye: Reactive, symmetric Throat and Neck: Supple, full facemask in place. Lung: Reduced air entry bilaterally with prolonged exhalation but no wheezing. Occasional crackles. Heart: S1+S2. No murmurs Abdomen: soft, non-tender. Bowel sounds normal. No masses; obese Extremities: No significant edema : Not done, Hyde catheter in place. She was given Lasix and is making decent urine output. Skin: No cyanosis Neurologic:  Currently somnolent. Appears to be intact. Lab/Data Review: 
 
Recent Results (from the past 24 hour(s)) GLUCOSE, POC Collection Time: 21  5:35 PM  
Result Value Ref Range Glucose (POC) 141 (H) 65 - 100 mg/dL Performed by Cooper Hager GLUCOSE, POC Collection Time: 21 10:56 PM  
Result Value Ref Range Glucose (POC) 95 65 - 100 mg/dL Performed by Loretta Shearer BLOOD GAS, ARTERIAL Collection Time: 21  4:20 AM  
Result Value Ref Range pH 7.43 7.35 - 7.45    
 PCO2 46 (H) 35 - 45 mmHg PO2 54 (L) 75 - 100 mmHg O2 SAT 88 (L) >95 % BICARBONATE 29 (H) 22 - 26 mmol/L  
 BASE EXCESS 4.8 (H) 0 - 2 mmol/L  
 O2 METHOD High Flow O2    
 O2 FLOW RATE 60.0 L/min FIO2 80.0 % EPAP/CPAP/PEEP 0    
 SITE Left Radial    
 JACQUELIN'S TEST PASS URINALYSIS W/MICROSCOPIC Collection Time: 21  7:45 AM  
Result Value Ref Range Color Yellow/Straw Appearance Clear Clear Specific gravity 1.015 1.003 - 1.030    
 pH (UA) 5.0 5.0 - 8.0 Protein Negative Negative mg/dL Glucose Negative Negative mg/dL Ketone Negative Negative mg/dL Bilirubin Negative Negative Blood Small (A) Negative Urobilinogen 0.1 0.1 - 1.0 EU/dL Nitrites Negative Negative Leukocyte Esterase Negative Negative WBC 0-4 0 - 4 /hpf  
 RBC 10-20 0 - 5 /hpf Bacteria Negative Negative /hpf Hyaline cast 5-10 0 - 5 /lpf  
GLUCOSE, POC Collection Time: 04/19/21  9:37 AM  
Result Value Ref Range Glucose (POC) 137 (H) 65 - 100 mg/dL Performed by Nolene Stain   
METABOLIC PANEL, COMPREHENSIVE Collection Time: 04/19/21  9:45 AM  
Result Value Ref Range Sodium 137 136 - 145 mmol/L Potassium 3.4 (L) 3.5 - 5.1 mmol/L Chloride 101 97 - 108 mmol/L  
 CO2 27 21 - 32 mmol/L Anion gap 9 5 - 15 mmol/L Glucose 136 (H) 65 - 100 mg/dL BUN 23 (H) 6 - 20 mg/dL Creatinine 1.23 (H) 0.55 - 1.02 mg/dL BUN/Creatinine ratio 19 12 - 20 GFR est AA 52 (L) >60 ml/min/1.73m2 GFR est non-AA 43 (L) >60 ml/min/1.73m2 Calcium 8.4 (L) 8.5 - 10.1 mg/dL Bilirubin, total 0.4 0.2 - 1.0 mg/dL AST (SGOT) 51 (H) 15 - 37 U/L  
 ALT (SGPT) 30 12 - 78 U/L Alk. phosphatase 90 45 - 117 U/L Protein, total 6.1 (L) 6.4 - 8.2 g/dL Albumin 2.7 (L) 3.5 - 5.0 g/dL Globulin 3.4 2.0 - 4.0 g/dL A-G Ratio 0.8 (L) 1.1 - 2.2 GLUCOSE, POC Collection Time: 04/19/21 11:32 AM  
Result Value Ref Range Glucose (POC) 136 (H) 65 - 100 mg/dL Performed by Nolene Stain   
 
 
XR CHEST PORT Final Result No significant interval change. XR CHEST PORT Final Result Increased moderate diffuse bilateral airspace opacities, likely a combination of  
edema and airspace disease. Developing ARDS not excluded. XR CHEST PORT Final Result XR CHEST PORT Final Result XR CHEST PORT Final Result XR CHEST PORT Final Result There is persistent airspace disease within the mid to lower lung  
zone predominance.  This represents a heterogeneous pattern as might be  
associated with an atypical pneumonia. There has been little interval change. XR CHEST PORT Final Result XR CHEST PORT Final Result XR CHEST PORT Final Result No significant interval change. XR CHEST PORT Final Result XR CHEST PORT    (Results Pending) XR CHEST PORT    (Results Pending) XR CHEST PORT    (Results Pending) CT Results  (Last 48 hours) None Assessment: 1. Acute respiratory failure with hypoxia 2. COVID-19 pneumonia 3. Shortness of breath 4.  Cough 5. Generalized weakness 6. Acute kidney injury 7. Obstructive sleep apnea on CPAP 
8. Obesity Plan:  
 
Transferred to ICU as she was getting fatigued and was dropping her sats This morning she was on a 60% high flow oxygen but became agitated. Took it off and was desaturating. Given Ativan 1 mg and replaced on BiPAP. Blood gases on 60% high flow this morning showed 7.43/46/54. Chest x-ray shows persistent bilateral somewhat diffuse infiltrates. Agree with the report. Personally reviewed. Currently on BiPAP AVAPS mode, tidal volume 500/2 100%. PEEP is 10. Saturations are good. However relatively tachypneic and tachycardiac. Continue with diuresis. She is now negative for COVID-19. ID is also on the case. We will again try her on high flow nasal cannula oxygen 60 L with 80%, later on today when she comes off the BiPAP. We will repeat blood gas and chest x-ray in the morning. Will use oxygen supplementation as needed to keep saturation above 90% Patient has acute respiratory failure with hypoxia due to COVID-19 pneumonia Status post 5 days of remdesivir Currently on Decadron 6 mg IV every 24 hours Off azithromycin, on IV Zosyn per ID Status post tocilizumab on 4/5/2021 Vitamin C and zinc 
On therapeutic dose of Lovenox Chest x-ray with persistent bilateral infiltrates and hypoinflation Echo normal LVEF Renal function stable today. Monitor renal function with fluid changes Check electrolytes and replace as needed DVT and GI prophylaxis Questions of patient were answered at bedside in detail Case discussed in detail with RN, RT, and care team 
Thank you for involving me in the care of this patient I will follow with you closely during hospitalization Reason for etiology is not clear. May be she is getting too much psych medication. May need psych evaluation. Greater than 50 minutes were spent in direct care with this patient. Sonali Good MD 
[facility] 4/19/2021

## 2021-04-19 NOTE — PROGRESS NOTES
Hospitalist Progress Note Daily Progress Note: 4/19/2021 51-year-old female with acute hypoxic respiratory failure secondary to Covid pneumonia. Subjective: The patient is seen for follow  up. Bipap this morning transiently after agitation and subsequently removed mask, now appears to be tolerating hfnc 60 L. More groggy, able to carry on conversation, respond mostly appropriately. Pleasantly confused intermittently. Daughter at bedside. She is seen in the ICU now, moved there earlier today as she was hypoxic and increasingly fatigued. When I saw her in the ICU she was lucid, looks much better now. Tolerating CPAP at 100% FiO2, saturating at 100%. Partially removed mask to understand her speech and she maintained saturation greater than 96%. Transitioned her to high flow nasal cannula at 60% and so far she is tolerating. Problem List: 
Problem List as of 4/19/2021 Date Reviewed: 11/21/2017 Codes Class Noted - Resolved Respiratory failure ([address] ICD-10-CM: J96.90 ICD-9-CM: 518.81  4/4/2021 - Present Ductal carcinoma in situ (DCIS) of left breast ICD-10-CM: D05.12 
ICD-9-CM: 233.0  11/21/2017 - Present Overview Addendum 11/21/2017  1:39 PM by Ruben Zelaya MD  
  11/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 HI 6% Medications reviewed Current Facility-Administered Medications Medication Dose Route Frequency  dexamethasone (DECADRON) 4 mg/mL injection 6 mg  6 mg IntraVENous Q24H  hydrOXYzine pamoate (VISTARIL) capsule 25 mg  25 mg Oral QID PRN  piperacillin-tazobactam (ZOSYN) 3.375 g in 0.9% sodium chloride (MBP/ADV) 100 mL MBP  3.375 g IntraVENous Q8H  
 enoxaparin (LOVENOX) injection 90 mg  1 mg/kg SubCUTAneous Q12H  furosemide (LASIX) injection 40 mg  40 mg IntraVENous DAILY  QUEtiapine (SEROquel) tablet 12.5 mg  12.5 mg Oral QHS  benzocaine-menthoL (CEPACOL) lozenge 1 Lozenge  1 Lozenge Mucous Membrane PRN  
 nystatin (MYCOSTATIN) 100,000 unit/mL oral suspension 500,000 Units  500,000 Units Oral QID  dextrose (D50W) injection syrg 12.5-25 g  25-50 mL IntraVENous PRN  
 insulin lispro (HUMALOG) injection   SubCUTAneous AC&HS  atorvastatin (LIPITOR) tablet 10 mg  10 mg Oral DAILY  imipramine (TOFRANIL) tablet 50 mg  50 mg Oral QHS  levothyroxine (SYNTHROID) tablet 88 mcg  88 mcg Oral ACB  [Held by provider] lisinopril-hydroCHLOROthiazide (PRINZIDE, ZESTORETIC) 10-12.5 mg per tablet 1 Tab  1 Tab Oral DAILY  meloxicam (MOBIC) tablet 7.5 mg  7.5 mg Oral DAILY  pantoprazole (PROTONIX) tablet 20 mg  20 mg Oral DAILY  sertraline (ZOLOFT) tablet 100 mg  100 mg Oral DAILY  cyclobenzaprine (FLEXERIL) tablet 10 mg  10 mg Oral TID  glucose chewable tablet 16 g  4 Tab Oral PRN  
 dextrose (D50W) injection syrg 12.5-25 g  25-50 mL IntraVENous PRN  
 glucagon (GLUCAGEN) injection 1 mg  1 mg IntraMUSCular PRN  zinc sulfate (ZINCATE) 50 mg zinc (220 mg) capsule 1 Cap  1 Cap Oral DAILY  acetaminophen (TYLENOL) tablet 650 mg  650 mg Oral Q6H PRN  
 melatonin tablet 5 mg  5 mg Oral QHS PRN  
 ondansetron (ZOFRAN) injection 4 mg  4 mg IntraVENous Q4H PRN Review of Systems:  
Review of Systems Constitutional: Positive for malaise/fatigue. Negative for chills and fever. HENT: Negative. Eyes: Negative. Respiratory: Positive for cough, sputum production and shortness of breath. Negative for hemoptysis and wheezing. Cardiovascular: Negative. Gastrointestinal: Negative. Genitourinary: Negative. Musculoskeletal: Negative. Skin: Negative. Neurological: Positive for weakness. Negative for dizziness, focal weakness and headaches. Endo/Heme/Allergies: Negative. Psychiatric/Behavioral: Negative. Objective:  
Physical Exam:  
 
Visit Vitals /60 (BP Patient Position: At rest) Pulse (!) 103 Temp 98.6 °F (37 °C) Resp 24 Ht 5' 4.5\" (1.638 m) Wt 94 kg (207 lb 3.7 oz) SpO2 97% Breastfeeding No  
BMI 35.02 kg/m² O2 Flow Rate (L/min): 60 l/min O2 Device: Hi flow nasal cannula Temp (24hrs), Av.4 °F (36.9 °C), Min:97.4 °F (36.3 °C), Max:98.7 °F (37.1 °C) No intake/output data recorded.  1901 -  0700 In: 100 [I.V.:100] Out: 350 [Urine:350] General:         Appears ga bit lethargic, alert, not appearing   distressed EENT:              EOMI. Anicteric sclerae. MMM Resp:               Decreased air entry bilaterally + bilateral bibasilar crackles CV:                  Regular  rhythm, S1 plus S2, no murmurs rubs or   gallops  No edema GI:                   Soft, Non distended, Non tender.  +Bowel sounds Neurologic:       Alert and oriented X 2, normal speech, Psych:    fair insight. Not anxious nor agitated Skin:                No rashes. No jaundice. IV infiltrated rt forearm. Data Review:  
   
Recent Days: 
Recent Labs 21 
7101 WBC 11.0 HGB 13.5 HCT 41.0  
 Recent Labs 21 
1043 21 
0955 21 
4607  135* 135* K 3.3* 3.6 3.7  100 104 CO2 29 27 24 * 104* 94 BUN 23* 24* 29* CREA 1.19* 1.01 0.98  
CA 8.6 8.9 8.6 ALB 2.6* 2.9* 2.4* TBILI 0.5 0.5 0.5 ALT 27 28 30 Recent Labs 21 
0420 21 
1800 PH 7.43 7.45  
PCO2 46* 42  
PO2 54* 85 HCO3 29* 29* FIO2 80.0 100.0  
 
 
24 Hour Results: 
Recent Results (from the past 24 hour(s)) GLUCOSE, POC Collection Time: 21  7:56 AM  
Result Value Ref Range Glucose (POC) 121 (H) 65 - 100 mg/dL Performed by Kisha Moore Collection Time: 21 10:43 AM  
Result Value Ref Range Sodium 137 136 - 145 mmol/L Potassium 3.3 (L) 3.5 - 5.1 mmol/L Chloride 100 97 - 108 mmol/L  
 CO2 29 21 - 32 mmol/L Anion gap 8 5 - 15 mmol/L Glucose 141 (H) 65 - 100 mg/dL BUN 23 (H) 6 - 20 mg/dL Creatinine 1.19 (H) 0.55 - 1.02 mg/dL  BUN/Creatinine ratio 19 12 - 20 GFR est AA 54 (L) >60 ml/min/1.73m2 GFR est non-AA 44 (L) >60 ml/min/1.73m2 Calcium 8.6 8.5 - 10.1 mg/dL Bilirubin, total 0.5 0.2 - 1.0 mg/dL AST (SGOT) 37 15 - 37 U/L  
 ALT (SGPT) 27 12 - 78 U/L Alk. phosphatase 82 45 - 117 U/L Protein, total 6.0 (L) 6.4 - 8.2 g/dL Albumin 2.6 (L) 3.5 - 5.0 g/dL Globulin 3.4 2.0 - 4.0 g/dL A-G Ratio 0.8 (L) 1.1 - 2.2 GLUCOSE, POC Collection Time: 04/18/21 11:47 AM  
Result Value Ref Range Glucose (POC) 143 (H) 65 - 100 mg/dL Performed by Lazaro Canchola, POC Collection Time: 04/18/21  5:35 PM  
Result Value Ref Range Glucose (POC) 141 (H) 65 - 100 mg/dL Performed by Amalia Calvo GLUCOSE, POC Collection Time: 04/18/21 10:56 PM  
Result Value Ref Range Glucose (POC) 95 65 - 100 mg/dL Performed by Florencio Ax BLOOD GAS, ARTERIAL Collection Time: 04/19/21  4:20 AM  
Result Value Ref Range pH 7.43 7.35 - 7.45    
 PCO2 46 (H) 35 - 45 mmHg PO2 54 (L) 75 - 100 mmHg O2 SAT 88 (L) >95 % BICARBONATE 29 (H) 22 - 26 mmol/L  
 BASE EXCESS 4.8 (H) 0 - 2 mmol/L  
 O2 METHOD High Flow O2    
 O2 FLOW RATE 60.0 L/min FIO2 80.0 % EPAP/CPAP/PEEP 0    
 SITE Left Radial    
 JACQUELIN'S TEST PASS Assessment/  
 
Patient Active Problem List  
Diagnosis Code  Ductal carcinoma in situ (DCIS) of left breast D05.12  Respiratory failure ([facility] [address] J96.90 Acute respiratory failure with hypoxia secondary to COVID-19 pna:  
-patient presented with acute respiratory failure with hypoxia requiring high flow nasal cannula for ventilatory support and based on patient's clinical presentation secondary to COVID-19 pneumonia, patient does not meet sepsis criteria at this time Completed Redemsivir Continue Decadron 6 mg IV every 8 Completed  azithromycin Continue to monitor respiratory status, moved to icu 4/18 am 2/2 fatigue, hypoxia on cpap, in icu appeared more settled down but transiently on bipap earlier 2/2 pulling mask off/agitation-settled down now and on hfnc sao2~94. Pulling off mask has been intermittent problem, needs close monitoring. Mitts were attempted though anxiety led to much increased anxiety/agitation. Pulmonology consult appreciated, Infectious disease consult appreciated Cxr diffuse opacities, unchanged. Developing ards? D Dimer increased, inc lovenox to 1mg/kg bid. abg am 
  
Hypertension hx Profoundly hypotensive am 4/14- responded to fluids 
bp rx and lasix held, bp subsequently & resumed lasix as appeared more labored and suspecting edema component Judiciously hydrate. Bp has remained adequate since resuming lasix though is trending down- watch to adkust diuresis. 
  
Hypothyroidism 
continue Synthroid 
  
Gastroesophageal reflux disease 
continue Protonix once daily 
  
Hyperglycemia due to type 2 diabetes  
likely iatrogenic/steroids, currently controlled 
continue lispro tid according to 24 hour coverage Continue insulin sliding scale 
  
Depression/anxietycontinue home Zoloft 
  
Hx breast ca/meningioma. Xrt with breat ca 1.5 years ago. 
  
ProphylaxisLovenox FENcardiac diet, replete potassium and magnesium Full code, POA is daughter Orelia Cleaves [phone], updated at bedside. Dispositionpending course, slow wean. Considering vibra. 
  
  
25.0 - 29.9 Overweight / Body mass index is 35.28 kg/m². Care Plan discussed with: Patient/Family, Nurse,  and Consultant . Total time spent with patient: 30 minutes. This dictation was done by dragon, computer voice recognition software. Often unanticipated grammatical, syntax, phones and other interpretive errors are inadvertently transcribed. Please excuse errors that have escaped final proofreading.  
 
Shayan Green MD

## 2021-04-20 NOTE — PROGRESS NOTES
Pulmonary and Critical Care Progress Note Subjective:  
 
Patient seen and examined Overnight events noted Patient got transferred to ICU as she dropped her oxygen saturations on the floor. She reported she was getting fatigued. ICU she was placed on CPAP. Discussed with the RN at the bedside. Patient is much more awake and alert today. Over the night she was on BiPAP. Currently she is on high flow oxygen 85%, 65 L flow. No distress. It appears that yesterday afternoon she was on high flow as well. Her mentation slowly cleared. She is not agitated anymore. Answers simple questions. Had some p.o. intake as well. BiPAP settings include AVAPS mode, 500 tidal volume, 60% oxygen. Chest x-ray continues to show bilateral patchy infiltrates, slightly worse today. Review of Systems: 
Limited review of system because patient is having difficulty to communicate with her CPAP. Current Facility-Administered Medications Medication Dose Route Frequency Provider Last Rate Last Admin  magnesium sulfate 3 g in 0.9% sodium chloride 100 mL IVPB  3 g IntraVENous Sita Barillas MD 33.3 mL/hr at 04/20/21 1046 3 g at 04/20/21 1046  dexamethasone (DECADRON) 4 mg/mL injection 6 mg  6 mg IntraVENous Q24H Reinaldo Huff MD   6 mg at 04/19/21 1813  hydrOXYzine pamoate (VISTARIL) capsule 25 mg  25 mg Oral QID PRN Olivia CARRASCO MD   25 mg at 04/18/21 9206  piperacillin-tazobactam (ZOSYN) 3.375 g in 0.9% sodium chloride (MBP/ADV) 100 mL MBP  3.375 g IntraVENous Q8H Chelsea Fajardo MD 25 mL/hr at 04/20/21 0200 3.375 g at 04/20/21 0200  enoxaparin (LOVENOX) injection 90 mg  1 mg/kg SubCUTAneous Q12H Olivia CARRASCO MD   90 mg at 04/19/21 2225  furosemide (LASIX) injection 40 mg  40 mg IntraVENous DAILY Olivia CARRASCO MD   40 mg at 04/20/21 4888  
 benzocaine-menthoL (CEPACOL) lozenge 1 Lozenge  1 Lozenge Mucous Membrane PRN Yves, [address] Vikash Brown, MD   1 Lozenge at 04/09/21 1640  nystatin (MYCOSTATIN) 100,000 unit/mL oral suspension 500,000 Units  500,000 Units Oral QID Minus MD Rashard   500,000 Units at 04/20/21 7261  dextrose (D50W) injection syrg 12.5-25 g  25-50 mL IntraVENous PRN Minus MD Rashard      
 insulin lispro (HUMALOG) injection   SubCUTAneous AC&HS Minus MD Rashard   Stopped at 04/18/21 2200  
 atorvastatin (LIPITOR) tablet 10 mg  10 mg Oral DAILY Zach Martinez MD   10 mg at 04/20/21 5576  levothyroxine (SYNTHROID) tablet 88 mcg  88 mcg Oral ACB Zach Martinez MD   88 mcg at 04/20/21 5642  [Held by provider] lisinopril-hydroCHLOROthiazide (PRINZIDE, ZESTORETIC) 10-12.5 mg per tablet 1 Tab  1 Tab Oral DAILY Zach Martinez MD   Stopped at 04/15/21 0900  [Held by provider] meloxicam (MOBIC) tablet 7.5 mg  7.5 mg Oral DAILY Zach Martinez MD   Stopped at 04/19/21 0900  pantoprazole (PROTONIX) tablet 20 mg  20 mg Oral DAILY Zach Martinez MD   20 mg at 04/20/21 1987  sertraline (ZOLOFT) tablet 100 mg  100 mg Oral DAILY Zach Martinez MD   100 mg at 04/20/21 7518  cyclobenzaprine (FLEXERIL) tablet 10 mg  10 mg Oral TID Zach Martinez MD   10 mg at 04/20/21 4166  
 glucose chewable tablet 16 g  4 Tab Oral PRN Zach Martinez MD      
 dextrose (D50W) injection syrg 12.5-25 g  25-50 mL IntraVENous PRN Zach Martinez MD      
 glucagon [facility]) injection 1 mg  1 mg IntraMUSCular PRN Zach Martinez MD      
 acetaminophen (TYLENOL) tablet 650 mg  650 mg Oral Q6H PRN Zach Martinez MD   650 mg at 04/18/21 4084  melatonin tablet 5 mg  5 mg Oral QHS PRN Zach Martinez MD   5 mg at 04/15/21 2158  ondansetron (ZOFRAN) injection 4 mg  4 mg IntraVENous Q4H PRN Zach Martinez MD   4 mg at 04/20/21 0402 No Known Allergies Objective:  
 
Blood pressure 104/73, pulse 98, temperature (!) 96.2 °F (35.7 °C), resp.  rate 21, height 5' 4.49\" (1.638 m), weight 94 kg (207 lb 3.7 oz), SpO2 96 %, not currently breastfeeding. Temp (24hrs), Av.7 °F (35.9 °C), Min:96.2 °F (35.7 °C), Max:97 °F (36.1 °C) Intake and Output: 
Current Shift: No intake/output data recorded. Last 3 Shifts:  1901 -  0700 In: 110 [I.V.:100] Out: 475 [Urine:475] Physical Exam:  
 
General: Lying in bed comfortably, no acute distress, on high flow oxygen. She wakes up easily and answers questions. Eye: Reactive, symmetric Throat and Neck: Supple, full facemask in place. Lung: Reduced air entry bilaterally with prolonged exhalation but no wheezing. Occasional crackles. Heart: S1+S2. No murmurs. Abdomen: soft, non-tender. Bowel sounds normal. No masses; obese Extremities: No significant edema : Not done, Hyde catheter in place. She was given Lasix and is making decent urine output. Skin: No cyanosis Neurologic:  Currently somnolent. Appears to be intact. Lab/Data Review: 
 
Recent Results (from the past 24 hour(s)) BLOOD GAS, ARTERIAL Collection Time: 21 12:50 PM  
Result Value Ref Range pH 7.42 7.35 - 7.45    
 PCO2 46 (H) 35 - 45 mmHg PO2 92 75 - 100 mmHg O2 SAT 97 >95 % BICARBONATE 28 (H) 22 - 26 mmol/L  
 BASE EXCESS 4.3 (H) 0 - 2 mmol/L  
 O2 METHOD BiPAP    
 FIO2 100.0 % Tidal volume 500 SET RATE 18    
 EPAP/CPAP/PEEP 8 SITE Right Radial    
 JACQUELIN'S TEST PASS    
GLUCOSE, POC Collection Time: 21  5:11 PM  
Result Value Ref Range Glucose (POC) 107 (H) 65 - 100 mg/dL Performed by [facility] GLUCOSE, POC Collection Time: 21 10:28 PM  
Result Value Ref Range Glucose (POC) 138 (H) 65 - 100 mg/dL Performed by John Rausch METABOLIC PANEL, COMPREHENSIVE Collection Time: 21  4:00 AM  
Result Value Ref Range Sodium 139 136 - 145 mmol/L Potassium 4.2 3.5 - 5.1 mmol/L Chloride 103 97 - 108 mmol/L  
 CO2 27 21 - 32 mmol/L  Anion gap 9 5 - 15 mmol/L Glucose 120 (H) 65 - 100 mg/dL BUN 25 (H) 6 - 20 mg/dL Creatinine 1.11 (H) 0.55 - 1.02 mg/dL BUN/Creatinine ratio 23 (H) 12 - 20 GFR est AA 58 (L) >60 ml/min/1.73m2 GFR est non-AA 48 (L) >60 ml/min/1.73m2 Calcium 8.5 8.5 - 10.1 mg/dL Bilirubin, total 0.3 0.2 - 1.0 mg/dL AST (SGOT) 42 (H) 15 - 37 U/L  
 ALT (SGPT) 29 12 - 78 U/L Alk. phosphatase 87 45 - 117 U/L Protein, total 6.0 (L) 6.4 - 8.2 g/dL Albumin 2.5 (L) 3.5 - 5.0 g/dL Globulin 3.5 2.0 - 4.0 g/dL A-G Ratio 0.7 (L) 1.1 - 2.2 MAGNESIUM Collection Time: 04/20/21  4:00 AM  
Result Value Ref Range Magnesium 1.5 (L) 1.6 - 2.4 mg/dL CBC WITH AUTOMATED DIFF Collection Time: 04/20/21  4:00 AM  
Result Value Ref Range WBC 11.0 3.6 - 11.0 K/uL  
 RBC 4.17 3.80 - 5.20 M/uL  
 HGB 12.0 11.5 - 16.0 g/dL HCT 37.1 35.0 - 47.0 % MCV 89.0 80.0 - 99.0 FL  
 MCH 28.8 26.0 - 34.0 PG  
 MCHC 32.3 30.0 - 36.5 g/dL  
 RDW 17.9 (H) 11.5 - 14.5 % PLATELET 194 430 - 997 K/uL MPV 11.3 8.9 - 12.9 FL  
 NEUTROPHILS 89 (H) 32 - 75 % LYMPHOCYTES 6 (L) 12 - 49 % MONOCYTES 4 (L) 5 - 13 % EOSINOPHILS 1 0 - 7 % BASOPHILS 0 0 - 1 % IMMATURE GRANULOCYTES 0 0.0 - 0.5 % ABS. NEUTROPHILS 9.8 (H) 1.8 - 8.0 K/UL  
 ABS. LYMPHOCYTES 0.6 (L) 0.8 - 3.5 K/UL  
 ABS. MONOCYTES 0.5 0.0 - 1.0 K/UL  
 ABS. EOSINOPHILS 0.1 0.0 - 0.4 K/UL  
 ABS. BASOPHILS 0.0 0.0 - 0.1 K/UL  
 ABS. IMM. GRANS. 0.0 0.00 - 0.04 K/UL  
 DF AUTOMATED    
C REACTIVE PROTEIN, QT Collection Time: 04/20/21  4:00 AM  
Result Value Ref Range C-Reactive protein 3.52 (H) 0.00 - 0.60 mg/dL BLOOD GAS, ARTERIAL Collection Time: 04/20/21  4:17 AM  
Result Value Ref Range pH 7.43 7.35 - 7.45    
 PCO2 45 35 - 45 mmHg PO2 58 (L) 75 - 100 mmHg O2 SAT 92 (L) >95 % BICARBONATE 28 (H) 22 - 26 mmol/L  
 BASE EXCESS 4.2 (H) 0 - 2 mmol/L  
 O2 METHOD AVAPS    
 FIO2 60.0 % Tidal volume 500  EPAP/CPAP/PEEP 8.0 High PEEP 15 Sample source Arterial    
 SITE Right Radial    
 JACQUELIN'S TEST PASS    
GLUCOSE, POC Collection Time: 04/20/21  7:28 AM  
Result Value Ref Range Glucose (POC) 96 65 - 100 mg/dL Performed by Dinamundo GLUCOSE, POC Collection Time: 04/20/21 11:34 AM  
Result Value Ref Range Glucose (POC) 124 (H) 65 - 100 mg/dL Performed by Dinamundo   
 
 
XR CHEST PORT Final Result XR CHEST PORT Final Result No significant interval change. XR CHEST PORT Final Result Increased moderate diffuse bilateral airspace opacities, likely a combination of  
edema and airspace disease. Developing ARDS not excluded. XR CHEST PORT Final Result XR CHEST PORT Final Result XR CHEST PORT Final Result XR CHEST PORT Final Result There is persistent airspace disease within the mid to lower lung  
zone predominance. This represents a heterogeneous pattern as might be  
associated with an atypical pneumonia. There has been little interval change. XR CHEST PORT Final Result XR CHEST PORT Final Result XR CHEST PORT Final Result No significant interval change. XR CHEST PORT Final Result XR CHEST PORT    (Results Pending) XR CHEST PORT    (Results Pending) XR CHEST PORT    (Results Pending) CT Results  (Last 48 hours) None Assessment: 1. Acute respiratory failure with hypoxia 2. COVID-19 pneumonia 3. Shortness of breath 4.  Cough 5. Generalized weakness 6. Acute kidney injury 7. Obstructive sleep apnea on CPAP 
8. Obesity Plan:  
 
Transferred to ICU as she was getting fatigued and was dropping her sats On the morning of 4/19 she was on a 60% high flow oxygen but became agitated. Took it off and was desaturating. Given Ativan 1 mg and placed on BiPAP. This morning she is much more awake and alert. On 85% high flow oxygen, 65 L flow.  
Over the night she was on BiPAP AVAPS 500 tidal volume, O2 60%. Blood gases done this morning on BiPAP 60% showed 7.4 3/45/58. Blood gases yesterday morning showed on 60% high flow showed 7.43/46/54. Chest x-ray shows persistent bilateral somewhat diffuse infiltrates. Infiltrates appear to be more pronounced today. Need to diurese her more aggressively. She is now negative for COVID-19. ID is also on the case. Continue with high flow oxygen, wean down as tolerated to keep saturation more than 92%. Use BiPAP as needed and then nocturnally. Patient has acute respiratory failure with hypoxia due to COVID-19 pneumonia Status post 5 days of remdesivir Currently on Decadron 6 mg IV every 24 hours Off azithromycin, on IV Zosyn per ID Status post tocilizumab on 4/5/2021 Vitamin C and zinc 
On therapeutic dose of Lovenox Chest x-ray with persistent bilateral infiltrates and hypoinflation Echo normal LVEF Renal function stable today. Monitor renal function with fluid changes Check electrolytes and replace as needed DVT and GI prophylaxis Questions of patient were answered at bedside in detail Case discussed in detail with RN, RT, and care team 
Thank you for involving me in the care of this patient I will follow with you closely during hospitalization Reason for agitation is not clear. May be she is getting too much psych medication. May need psych evaluation. However she is much more lucid today. I stopped imipramine and quetiapine yesterday. Greater than 50 minutes were spent in direct care with this patient. Isra Santos MD 
[facility] [address] 4/20/2021

## 2021-04-20 NOTE — PROGRESS NOTES
DARSHANA was informed by nursing that patient's daughter Blaine Grady had many questions regarding [facility]. DARSHANA reached out to [employer], liaison at [facility], and gave her Sarah's name and contact information. Kath states she is going to reach out to Eileen to answer her questions. DARSHANA will follow up. ADDENDUM Incoming call from [employer] at [facility]. Kath states she attempted to call Eileen on both her cell and home number but did not get an answer. Kath states she did leave a message for Eileen to return her call.

## 2021-04-20 NOTE — WOUND CARE
Wound Care Note: Wound Care into see patient because of high risk pressure injury, Arsh 10 yesterday up to 13. Discussed prevention needs with Don Pablo and evaluated patient together. Red rash with spreading dots under right breast.  Buttocks, sacrum, slightly red, but blanchable. No open areas. Pt does slide down in the bed a lot, making her at risk for shear. Patient refuses boots. Skin Care & Pressure Relief Recommendations: 
Progressa with air confirmation # [id number] Elevate legs to prevent sliding down. Elevate heels with pillows if refuses boots. Minimize layers of linen Pads under patient to optimize support surface and microclimate Turn/reposition approximately every 2 hours. Pillow Wedges Manage incontinence Promote continence; Skin Protective lotion to buttocks and sacrum daily and as needed with incontinence care Offload heels with pillows or offloading boots.  
 
Discussed above plan with  Don Pablo RN

## 2021-04-20 NOTE — PROGRESS NOTES
Hospitalist Progress Note Daily Progress Note: 4/20/2021 29-year-old female with acute hypoxic respiratory failure secondary to Covid pneumonia. Subjective: The patient is seen for follow  up. Patient seen and evaluated at bedside, of note patient remains on high flow nasal cannula, patient currently complains of generalized weakness as well as a cough, slight improvement since yesterday, discussed with RN. Problem List: 
Problem List as of 4/20/2021 Date Reviewed: 11/21/2017 Codes Class Noted - Resolved Respiratory failure ([facility] [address] ICD-10-CM: J96.90 ICD-9-CM: 518.81  4/4/2021 - Present Ductal carcinoma in situ (DCIS) of left breast ICD-10-CM: D05.12 
ICD-9-CM: 233.0  11/21/2017 - Present Overview Addendum 11/21/2017  1:39 PM by Liliya Espinoza MD  
  11/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 SC 6% Medications reviewed Current Facility-Administered Medications Medication Dose Route Frequency  magnesium sulfate 3 g in 0.9% sodium chloride 100 mL IVPB  3 g IntraVENous ONCE  
 dexamethasone (DECADRON) 4 mg/mL injection 6 mg  6 mg IntraVENous Q24H  hydrOXYzine pamoate (VISTARIL) capsule 25 mg  25 mg Oral QID PRN  piperacillin-tazobactam (ZOSYN) 3.375 g in 0.9% sodium chloride (MBP/ADV) 100 mL MBP  3.375 g IntraVENous Q8H  
 enoxaparin (LOVENOX) injection 90 mg  1 mg/kg SubCUTAneous Q12H  furosemide (LASIX) injection 40 mg  40 mg IntraVENous DAILY  benzocaine-menthoL (CEPACOL) lozenge 1 Lozenge  1 Lozenge Mucous Membrane PRN  
 nystatin (MYCOSTATIN) 100,000 unit/mL oral suspension 500,000 Units  500,000 Units Oral QID  dextrose (D50W) injection syrg 12.5-25 g  25-50 mL IntraVENous PRN  
 insulin lispro (HUMALOG) injection   SubCUTAneous AC&HS  atorvastatin (LIPITOR) tablet 10 mg  10 mg Oral DAILY  levothyroxine (SYNTHROID) tablet 88 mcg  88 mcg Oral ACB  [Held by provider] lisinopril-hydroCHLOROthiazide (Marisa Weber) 10-12.5 mg per tablet 1 Tab  1 Tab Oral DAILY  [Held by provider] meloxicam (MOBIC) tablet 7.5 mg  7.5 mg Oral DAILY  pantoprazole (PROTONIX) tablet 20 mg  20 mg Oral DAILY  sertraline (ZOLOFT) tablet 100 mg  100 mg Oral DAILY  cyclobenzaprine (FLEXERIL) tablet 10 mg  10 mg Oral TID  glucose chewable tablet 16 g  4 Tab Oral PRN  
 dextrose (D50W) injection syrg 12.5-25 g  25-50 mL IntraVENous PRN  
 glucagon (GLUCAGEN) injection 1 mg  1 mg IntraMUSCular PRN  
 acetaminophen (TYLENOL) tablet 650 mg  650 mg Oral Q6H PRN  
 melatonin tablet 5 mg  5 mg Oral QHS PRN  
 ondansetron (ZOFRAN) injection 4 mg  4 mg IntraVENous Q4H PRN Review of Systems:  
Review of Systems Constitutional: Positive for malaise/fatigue. Negative for chills and fever. HENT: Negative. Eyes: Negative. Respiratory: Positive for cough, sputum production and shortness of breath. Negative for hemoptysis and wheezing. Cardiovascular: Negative. Gastrointestinal: Negative. Genitourinary: Negative. Musculoskeletal: Negative. Skin: Negative. Neurological: Positive for weakness. Negative for dizziness, focal weakness and headaches. Endo/Heme/Allergies: Negative. Psychiatric/Behavioral: Negative. Objective:  
Physical Exam:  
 
Visit Vitals /73 Pulse 98 Temp (!) 96.2 °F (35.7 °C) Resp 21 Ht 5' 4.5\" (1.638 m) Wt 94 kg (207 lb 3.7 oz) SpO2 96% Breastfeeding No  
BMI 35.02 kg/m² O2 Flow Rate (L/min): 65 l/min O2 Device: Heated, Hi flow nasal cannula Temp (24hrs), Av °F (36.1 °C), Min:96.2 °F (35.7 °C), Max:98.3 °F (36.8 °C) No intake/output data recorded.  1901 -  0700 In: 110 [I.V.:100] Out: 475 [Urine:475] General:         Appears ga bit lethargic, alert, not appearing   distressed EENT:              EOMI. Anicteric sclerae. MMM Resp:               Decreased air entry bilaterally + bilateral bibasilar crackles CV:                  Regular rhythm, S1 plus S2, no murmurs rubs or   gallops  No edema GI:                   Soft, Non distended, Non tender.  +Bowel sounds Neurologic:       Alert and oriented X 2, normal speech, Psych:    fair insight. Not anxious nor agitated Skin:                No rashes. No jaundice. IV infiltrated rt forearm. Data Review:  
   
Recent Days: 
Recent Labs  
  04/20/21 
0400 04/19/21 
0945 04/17/21 
0955 WBC 11.0 12.4* 11.0 HGB 12.0 12.9 13.5 HCT 37.1 38.6 41.0  
 186 195 Recent Labs  
  04/20/21 
0400 04/19/21 
0945 04/18/21 
1043  137 137  
K 4.2 3.4* 3.3*  
 101 100 CO2 27 27 29 * 136* 141* BUN 25* 23* 23* CREA 1.11* 1.23* 1.19* CA 8.5 8.4* 8.6 MG 1.5*  --   --   
ALB 2.5* 2.7* 2.6* TBILI 0.3 0.4 0.5 ALT 29 30 27 Recent Labs  
  04/20/21 
0417 04/19/21 
1250 04/19/21 
0420 PH 7.43 7.42 7.43  
PCO2 45 46* 46* PO2 58* 92 54* HCO3 28* 28* 29* FIO2 60.0 100.0 80.0  
 
 
24 Hour Results: 
Recent Results (from the past 24 hour(s)) GLUCOSE, POC Collection Time: 04/19/21  9:37 AM  
Result Value Ref Range Glucose (POC) 137 (H) 65 - 100 mg/dL [facility]   
METABOLIC PANEL, COMPREHENSIVE Collection Time: 04/19/21  9:45 AM  
Result Value Ref Range Sodium 137 136 - 145 mmol/L Potassium 3.4 (L) 3.5 - 5.1 mmol/L Chloride 101 97 - 108 mmol/L  
 CO2 27 21 - 32 mmol/L Anion gap 9 5 - 15 mmol/L Glucose 136 (H) 65 - 100 mg/dL BUN 23 (H) 6 - 20 mg/dL Creatinine 1.23 (H) 0.55 - 1.02 mg/dL BUN/Creatinine ratio 19 12 - 20 GFR est AA 52 (L) >60 ml/min/1.73m2 GFR est non-AA 43 (L) >60 ml/min/1.73m2 Calcium 8.4 (L) 8.5 - 10.1 mg/dL Bilirubin, total 0.4 0.2 - 1.0 mg/dL AST (SGOT) 51 (H) 15 - 37 U/L  
 ALT (SGPT) 30 12 - 78 U/L Alk. phosphatase 90 45 - 117 U/L Protein, total 6.1 (L) 6.4 - 8.2 g/dL Albumin 2.7 (L) 3.5 - 5.0 g/dL Globulin 3.4 2.0 - 4.0 g/dL  A-G Ratio 0.8 (L) 1.1 - 2.2    
CBC WITH AUTOMATED DIFF Collection Time: 04/19/21  9:45 AM  
Result Value Ref Range WBC 12.4 (H) 3.6 - 11.0 K/uL  
 RBC 4.37 3.80 - 5.20 M/uL  
 HGB 12.9 11.5 - 16.0 g/dL HCT 38.6 35.0 - 47.0 % MCV 88.3 80.0 - 99.0 FL  
 MCH 29.5 26.0 - 34.0 PG  
 MCHC 33.4 30.0 - 36.5 g/dL  
 RDW 17.0 (H) 11.5 - 14.5 % PLATELET 405 620 - 106 K/uL MPV 11.0 8.9 - 12.9 FL  
 NEUTROPHILS 86 (H) 32 - 75 % LYMPHOCYTES 4 (L) 12 - 49 % MONOCYTES 5 5 - 13 % EOSINOPHILS 4 0 - 7 % BASOPHILS 0 0 - 1 % IMMATURE GRANULOCYTES 1 (H) 0.0 - 0.5 % ABS. NEUTROPHILS 10.6 (H) 1.8 - 8.0 K/UL  
 ABS. LYMPHOCYTES 0.5 (L) 0.8 - 3.5 K/UL  
 ABS. MONOCYTES 0.6 0.0 - 1.0 K/UL  
 ABS. EOSINOPHILS 0.5 (H) 0.0 - 0.4 K/UL  
 ABS. BASOPHILS 0.0 0.0 - 0.1 K/UL  
 ABS. IMM. GRANS. 0.1 (H) 0.00 - 0.04 K/UL  
 DF AUTOMATED    
GLUCOSE, POC Collection Time: 04/19/21 11:32 AM  
Result Value Ref Range Glucose (POC) 136 (H) 65 - 100 mg/dL Performed by Jenny Person   
BLOOD GAS, ARTERIAL Collection Time: 04/19/21 12:50 PM  
Result Value Ref Range pH 7.42 7.35 - 7.45    
 PCO2 46 (H) 35 - 45 mmHg PO2 92 75 - 100 mmHg O2 SAT 97 >95 % BICARBONATE 28 (H) 22 - 26 mmol/L  
 BASE EXCESS 4.3 (H) 0 - 2 mmol/L  
 O2 METHOD BiPAP    
 FIO2 100.0 % Tidal volume 500 SET RATE 18    
 EPAP/CPAP/PEEP 8 SITE Right Radial    
 JACQUELIN'S TEST PASS    
GLUCOSE, POC Collection Time: 04/19/21  5:11 PM  
Result Value Ref Range Glucose (POC) 107 (H) 65 - 100 mg/dL Performed by [employer] GLUCOSE, POC Collection Time: 04/19/21 10:28 PM  
Result Value Ref Range Glucose (POC) 138 (H) 65 - 100 mg/dL Performed by Mariusz Ordonez METABOLIC PANEL, COMPREHENSIVE Collection Time: 04/20/21  4:00 AM  
Result Value Ref Range Sodium 139 136 - 145 mmol/L Potassium 4.2 3.5 - 5.1 mmol/L Chloride 103 97 - 108 mmol/L  
 CO2 27 21 - 32 mmol/L Anion gap 9 5 - 15 mmol/L Glucose 120 (H) 65 - 100 mg/dL  BUN 25 (H) 6 - 20 mg/dL Creatinine 1.11 (H) 0.55 - 1.02 mg/dL BUN/Creatinine ratio 23 (H) 12 - 20 GFR est AA 58 (L) >60 ml/min/1.73m2 GFR est non-AA 48 (L) >60 ml/min/1.73m2 Calcium 8.5 8.5 - 10.1 mg/dL Bilirubin, total 0.3 0.2 - 1.0 mg/dL AST (SGOT) 42 (H) 15 - 37 U/L  
 ALT (SGPT) 29 12 - 78 U/L Alk. phosphatase 87 45 - 117 U/L Protein, total 6.0 (L) 6.4 - 8.2 g/dL Albumin 2.5 (L) 3.5 - 5.0 g/dL Globulin 3.5 2.0 - 4.0 g/dL A-G Ratio 0.7 (L) 1.1 - 2.2 MAGNESIUM Collection Time: 04/20/21  4:00 AM  
Result Value Ref Range Magnesium 1.5 (L) 1.6 - 2.4 mg/dL CBC WITH AUTOMATED DIFF Collection Time: 04/20/21  4:00 AM  
Result Value Ref Range WBC 11.0 3.6 - 11.0 K/uL  
 RBC 4.17 3.80 - 5.20 M/uL  
 HGB 12.0 11.5 - 16.0 g/dL HCT 37.1 35.0 - 47.0 % MCV 89.0 80.0 - 99.0 FL  
 MCH 28.8 26.0 - 34.0 PG  
 MCHC 32.3 30.0 - 36.5 g/dL  
 RDW 17.9 (H) 11.5 - 14.5 % PLATELET 648 024 - 994 K/uL MPV 11.3 8.9 - 12.9 FL  
 NEUTROPHILS 89 (H) 32 - 75 % LYMPHOCYTES 6 (L) 12 - 49 % MONOCYTES 4 (L) 5 - 13 % EOSINOPHILS 1 0 - 7 % BASOPHILS 0 0 - 1 % IMMATURE GRANULOCYTES 0 0.0 - 0.5 % ABS. NEUTROPHILS 9.8 (H) 1.8 - 8.0 K/UL  
 ABS. LYMPHOCYTES 0.6 (L) 0.8 - 3.5 K/UL  
 ABS. MONOCYTES 0.5 0.0 - 1.0 K/UL  
 ABS. EOSINOPHILS 0.1 0.0 - 0.4 K/UL  
 ABS. BASOPHILS 0.0 0.0 - 0.1 K/UL  
 ABS. IMM. GRANS. 0.0 0.00 - 0.04 K/UL  
 DF AUTOMATED    
C REACTIVE PROTEIN, QT Collection Time: 04/20/21  4:00 AM  
Result Value Ref Range C-Reactive protein 3.52 (H) 0.00 - 0.60 mg/dL BLOOD GAS, ARTERIAL Collection Time: 04/20/21  4:17 AM  
Result Value Ref Range pH 7.43 7.35 - 7.45    
 PCO2 45 35 - 45 mmHg PO2 58 (L) 75 - 100 mmHg O2 SAT 92 (L) >95 % BICARBONATE 28 (H) 22 - 26 mmol/L  
 BASE EXCESS 4.2 (H) 0 - 2 mmol/L  
 O2 METHOD AVAPS    
 FIO2 60.0 % Tidal volume 500 EPAP/CPAP/PEEP 8.0 High PEEP 15  Sample source Arterial    
 SITE Right Radial    
 JACQUELIN'S TEST PASS    
GLUCOSE, POC Collection Time: 04/20/21  7:28 AM  
Result Value Ref Range Glucose (POC) 96 65 - 100 mg/dL Performed by [employer] Assessment/  
 
Patient Active Problem List  
Diagnosis Code  Ductal carcinoma in situ (DCIS) of left breast D05.12  Respiratory failure ([facility] [address] J96.90 Acute respiratory failure with hypoxia secondary to COVID-19 pna:  
-patient presented with acute respiratory failure with hypoxia requiring high flow nasal cannula for ventilatory support and based on patient's clinical presentation secondary to COVID-19 pneumonia, patient does not meet sepsis criteria at this time Completed Redemsivir Continue Decadron 6 mg IV every 8 Completed  azithromycin Continue to monitor respiratory status, moved to icu 4/18 am 2/2 fatigue, hypoxia on cpap, in icu appeared more settled down but transiently on bipap earlier 2/2 pulling mask off/agitation-settled down now and on hfnc sao2~94. Pulling off mask has been intermittent problem, needs close monitoring. Mitts were attempted though anxiety led to much increased anxiety/agitation. Pulmonology consult appreciated, Infectious disease consult appreciated 
 
  
Hypertensioncurrently resolved 
  
Hypothyroidism 
continue Synthroid 
  
Gastroesophageal reflux disease 
continue Protonix once daily 
  
Hyperglycemia due to type 2 diabetes  
likely iatrogenic/steroids, currently controlled 
continue lispro tid according to 24 hour coverage Continue insulin sliding scale Hypomagnesemiareplete magnesium and recheck magnesium 
  
Depression/anxietycontinue home Zoloft 
  
Hx breast ca/meningioma. Xrt with breat ca 1.5 years ago. 
  
ProphylaxisLovenox FENcardiac diet, replete potassium and magnesium Full code, POA is daughter Billee Ao [phone], updated at bedside. Dispositionpending course, slow wean. Considering vibra.  
Critical care time spent 35 minutes involving direct patient care as well as reviewing patient's labs and coordination of care with nursing staff 
  
25.0 - 29.9 Overweight / Body mass index is 35.28 kg/m². Care Plan discussed with: Patient/Family, Nurse,  and Consultant . Patricia Berry MD

## 2021-04-20 NOTE — PROGRESS NOTES
Patient requesting heel boots off and wanting water. Sips of water given and heel boots removed per request. Pt easily redirected at this time. Patient keeps saying she's scared of something and does not know what it is.

## 2021-04-20 NOTE — PROGRESS NOTES
Comprehensive Nutrition Assessment Type and Reason for Visit: Reassess(Goal; dysphagia) Nutrition Recommendations/Plan:  
Continue current diet Nursing to monitor BM, I/Os, PO % intake Nutrition Assessment:  Admin 2/2 respiratory failure/Covid+. BRAT diet PTA /2 c.diff. Poor appetite PTA. Reports children cook for her and has enough food at home. Pt reports fair appetite () no need for nutrition supplements at this time. Labs B-145 AST 42 Creat 1.11 BUN 25 K 3.4. Meds reviewed. Malnutrition Assessment: 
Malnutrition Status:  Mild malnutrition Context:  Acute illness Nutrition Related Findings:  NFPE not performed. Appears nourished. Denies N/V/C/D. BM . Reports C/S issues. Pt lacks top or bottom rows of teeth. Reports has dentures at home and only wears them out to eat. No edema documented. Wounds:   
Moisture associate skin damage(Inner groin moisture ass. skin damage) Current Nutrition Therapies: DIET CARDIAC Mechanical Soft; 2 Nectar/2 Mildly Thick Anthropometric Measures: 
· Height:  5' 4.49\" (163.8 cm) · Current Body Wt:  94 kg (207 lb 3.7 oz) · Admission Body Wt:  218 lb 0.6 oz   
· Usual Body Wt:   220 lb per pt · Ideal Body Wt:  122 lbs:  169.9 % · BMI Category:  Obese class 2 (BMI 35.0-39. 9) Wt Readings from Last 3 Encounters:  
21 94 kg (207 lb 3.7 oz)  
17 95.3 kg (210 lb) Nutrition Diagnosis: · Biting/chewing (masticatory) difficulty related to partial or complete edentulism as evidenced by poor dentition(SLP rec'd Ground/NTL diet) Nutrition Interventions:  
Food and/or Nutrient Delivery: Continue current diet Nutrition Education and Counseling: No recommendations at this time Coordination of Nutrition Care: No recommendation at this time Goals: 
EEN >75% PO intake. BG <180 mg/dL. Wt maintenance +/- 1 kg per wk.     
 
Nutrition Monitoring and Evaluation:  
Behavioral-Environmental Outcomes: None identified Food/Nutrient Intake Outcomes: Food and nutrient intake Physical Signs/Symptoms Outcomes: Weight, Biochemical data Discharge Planning:   
Continue current diet Electronically signed by Kamini Spence RD on 4/20/2021 at 4:24 PM 
 
Contact:  [phone]

## 2021-04-20 NOTE — PROGRESS NOTES
Infectious Disease Progress Note Subjective:  
Pt seen and examined at bedside. Doing well, denies new complaints, no acute events since last seen Objective:  
Physical Exam:  
 
Visit Vitals /68 Pulse (!) 108 Temp (!) 96.2 °F (35.7 °C) Resp 23 Ht 5' 4.49\" (1.638 m) Wt 207 lb 3.7 oz (94 kg) SpO2 97% Breastfeeding No  
BMI 35.04 kg/m² O2 Flow Rate (L/min): 65 l/min O2 Device: Heated, Hi flow nasal cannula Temp (24hrs), Av.7 °F (35.9 °C), Min:96.2 °F (35.7 °C), Max:97 °F (36.1 °C) No intake/output data recorded.  1901 -  0700 In: 110 [I.V.:100] Out: 475 [Urine:475] General: NAD, lethargic, confused HEENT: MICHELLE, Moist mucosa, on high flow Lungs: CTA b/l, no wheeze/rhonchi Heart: S1S2+, RRR, no murmur Abdo: Soft, NT, ND, +BS Exts: Trace edema, + pulses b/l  
Skin: No wounds, No rashes or lesions Data Review:  
   
Recent Days: 
Recent Labs  
  21 
0400 21 
0945 WBC 11.0 12.4* HGB 12.0 12.9 HCT 37.1 38.6  186 Recent Labs  
  21 
0400 21 
0945 21 
1043 BUN 25* 23* 23* CREA 1.11* 1.23* 1.19* Lab Results Component Value Date/Time C-Reactive protein 3.52 (H) 2021 04:00 AM  
  
Microbiology: None Diagnostics CXR Results  (Last 48 hours) 21 0245  XR CHEST PORT Final result Narrative:  Chest single view. Comparison single view chest 2021. Patchy alveolar opacities through the lungs, left lung more so than right. Cardiac and mediastinal structures unchanged. No pneumothorax or sizable pleural  
effusion. 21 0345  XR CHEST PORT Final result Impression:  No significant interval change. Narrative:  Chest, frontal view, 2021 History: Respiratory failure. Comparison: Including chest 2021. Findings: The cardiac silhouette is stable.   Rightward tracheal deviation is stable. The lungs remain underexpanded. Diffuse opacities in the lungs are not  
significantly changed. No pleural effusion or pneumothorax is identified. The  
osseous structures are stable. Assessment/Plan 1. COVID-19 pneumonitis, Ongoing infiltrates/edema on CXR (04/17), suspected ARDS Repeat test for COVID-19 neg on 04/17. S/p Remdesivir and a dose of Tocilizumab Off BIPAP back on high flow at 85% FIO2 On day # 4 of Zosyn for suspected aspiration PNA, remains on decadron Afebrile, WBC trending down on todays labs, CRP 3.52 Continue pulmonary toilet with incentive spirometer, wean off supplemental oxygen as tolerated 2. Acute hypoxia due to # 1, Off BIPAP, back on high flow 3. Acute on chronic renal failure: Will continue to monitor closely 4. Intermittent confusion: More alert and following commands today Héctor Doty MD  
 
4/20/2021

## 2021-04-20 NOTE — PROGRESS NOTES
Problem: Dysphagia (Adult) Goal: *Acute Goals and Plan of Care (Insert Text) Description: Speech Therapy Goals Initiated 4/20/2021 
-Patient will participate in modified barium swallow study within 7 day(s). [ ] Not met  [ ]  MET   [ ] Progressing  [ ] Jacqualin Alpers 
-Patient will tolerate dysphagia 2 diet with nectar thick liquids without signs/symptoms of aspiration given minimal cues within 7 day(s). [ ] Not met  [ ]  MET   [ ] Progressing  [ ] Jacqualin Alpers 
-Patient will demonstrate understanding of swallow safety precautions and aspiration precautions, diet recs with minimal cues within 7 day(s). [ ] Not met  [ ]  MET   [ ] Jack Maciel  [ ] Jacqualin Alpers Outcome: Not Met SPEECH LANGUAGE PATHOLOGY BEDSIDE SWALLOW EVALUATION Patient: Reta Carty (27 y.o. female) Date: 4/20/2021 Primary Diagnosis: Respiratory failure ([facility] [address] [J96.90] Precautions: aspiration ASSESSMENT : 
Based on the objective data described below, the patient presents with moderate pharyngeal dysphagia with thee s/s aspiration with thin. Pt currently on HFNC 02 85%, 60L/min with 02 sats 95% at baseline. Hypophonic vocal quality noted. Pt given trials thin via straw, nectar via straw, puree and soft solids. Oral phase appears largely [facility] with need for puree mixed with solids to help soften bolus due to pt is essentially edentulous. Pharyngeal phase with persistent mild delay present, mild weakness to palpation once initiated. Presence of HFNC appears to negatively impact oropharyngeal sw function. Pt demonstrates Simone Sumanth and significant cough response with suspected aspiration with thin via straw. 02 sats with unreliable reading reduced to 75%, rebounded after ~ 1-2 minutes to baseline. Otherwise, no overt s/s aspiration. Patient will benefit from skilled intervention to address the above impairments. Patients rehabilitation potential is considered to be Good PLAN : 
Recommendations and Planned Interventions: At this time, recommend diet downgrade to d2/nectar with 1:1 assistance with ALL PO intake, STRICT aspiration and GERD precautions, monitor pt closely for s/s aspiration, meds with nectar or puree, FEED ONLY IF AWAKE AND ALERT. Will request MBS if/as indicated pending pt's progress, not yet appropriate. Frequency/Duration: Patient will be followed by speech-language pathology 5 times a week to address goals. Discharge Recommendations: To Be Determined SUBJECTIVE:  
Patient seen for bedside sw evaluation, alert, agreeable. Nsg reports overall improvement this date with improved tolerance of PO intake. OBJECTIVE:  
 
CXR Results  (Last 48 hours) 04/20/21 0245  XR CHEST PORT Final result Narrative:  Chest single view. Comparison single view chest April 19, 2021. Patchy alveolar opacities through the lungs, left lung more so than right. Cardiac and mediastinal structures unchanged. No pneumothorax or sizable pleural  
effusion. 04/19/21 0345  XR CHEST PORT Final result Impression:  No significant interval change. Narrative:  Chest, frontal view, 4/19/2021 History: Respiratory failure. Comparison: Including chest 4/17/2021. Findings: The cardiac silhouette is stable. Rightward tracheal deviation is  
stable. The lungs remain underexpanded. Diffuse opacities in the lungs are not  
significantly changed. No pleural effusion or pneumothorax is identified. The  
osseous structures are stable. Past Medical History:  
Diagnosis Date  Brain tumor (benign) ([place] [address]  Depression with anxiety  Diabetes ([facility] [address]  GERD (gastroesophageal reflux disease)  Hypertension  Thyroid disease   
 hypothyroid Past Surgical History:  
Procedure Laterality Date  HX CHOLECYSTECTOMY Prior Level of Function/Home Situation:  
Home Situation Home Environment: Private residence # Steps to Enter: 0 One/Two Story Residence: One story Living Alone: Yes Support Systems: Family member(s), Child(faye) Patient Expects to be Discharged to[de-identified] Private residence Current DME Used/Available at Home: [employer]  Diet prior to admission: presumed regular/thin Current Diet:  Cardiac regular/thin  
Cognitive and Communication Status: 
Neurologic State: Alert Orientation Level: Oriented to person, Oriented to place Cognition: Follows commands Pain: 
0 After treatment:  
Patient left in no apparent distress in bed, Call bell within reach and Nursing notified COMMUNICATION/EDUCATION:  
Patient was educated regarding purpose of SLP assessment, POC, diet recs and sw safety precautions. Patient demonstrated Good understanding as evidenced by verbal responsiveness, needs reinforcement. The patient's plan of care including recommendations, planned interventions, and recommended diet changes were discussed with: Registered nurse. Patient/family have participated as able in goal setting and plan of care. Patient/family agree to work toward stated goals and plan of care. Thank you for this referral. 
Karen Gee M.S. CCC-SLP Time Calculation: 21 mins

## 2021-04-21 NOTE — PROGRESS NOTES
Hospitalist Progress Note Daily Progress Note: 4/21/2021 68-year-old female with acute hypoxic respiratory failure secondary to Covid pneumonia. Subjective: The patient is seen for follow  up. Patient seen and evaluated at bedside, of note patient remains on high flow nasal cannula, patient currently complains of generalized weakness as well as a cough, slight improvement since yesterday, discussed with RN. Problem List: 
Problem List as of 4/21/2021 Date Reviewed: 11/21/2017 Codes Class Noted - Resolved Respiratory failure ([facility] [address] ICD-10-CM: J96.90 ICD-9-CM: 518.81  4/4/2021 - Present Ductal carcinoma in situ (DCIS) of left breast ICD-10-CM: D05.12 
ICD-9-CM: 233.0  11/21/2017 - Present Overview Addendum 11/21/2017  1:39 PM by Carlos Desouza MD  
  11/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 HI 6% Medications reviewed Current Facility-Administered Medications Medication Dose Route Frequency  potassium chloride (K-DUR, KLOR-CON) SR tablet 60 mEq  60 mEq Oral NOW  furosemide (LASIX) injection 40 mg  40 mg IntraVENous BID  desitin/nystatin (1:1) topical compound   Topical BID  dexamethasone (DECADRON) 4 mg/mL injection 6 mg  6 mg IntraVENous Q24H  hydrOXYzine pamoate (VISTARIL) capsule 25 mg  25 mg Oral QID PRN  piperacillin-tazobactam (ZOSYN) 3.375 g in 0.9% sodium chloride (MBP/ADV) 100 mL MBP  3.375 g IntraVENous Q8H  
 enoxaparin (LOVENOX) injection 90 mg  1 mg/kg SubCUTAneous Q12H  
 benzocaine-menthoL (CEPACOL) lozenge 1 Lozenge  1 Lozenge Mucous Membrane PRN  
 nystatin (MYCOSTATIN) 100,000 unit/mL oral suspension 500,000 Units  500,000 Units Oral QID  dextrose (D50W) injection syrg 12.5-25 g  25-50 mL IntraVENous PRN  
 insulin lispro (HUMALOG) injection   SubCUTAneous AC&HS  atorvastatin (LIPITOR) tablet 10 mg  10 mg Oral DAILY  levothyroxine (SYNTHROID) tablet 88 mcg  88 mcg Oral ACB  [Held by provider] lisinopril-hydroCHLOROthiazide (PRINZIDE, ZESTORETIC) 10-12.5 mg per tablet 1 Tab  1 Tab Oral DAILY  [Held by provider] meloxicam (MOBIC) tablet 7.5 mg  7.5 mg Oral DAILY  pantoprazole (PROTONIX) tablet 20 mg  20 mg Oral DAILY  sertraline (ZOLOFT) tablet 100 mg  100 mg Oral DAILY  cyclobenzaprine (FLEXERIL) tablet 10 mg  10 mg Oral TID  glucose chewable tablet 16 g  4 Tab Oral PRN  
 dextrose (D50W) injection syrg 12.5-25 g  25-50 mL IntraVENous PRN  
 glucagon (GLUCAGEN) injection 1 mg  1 mg IntraMUSCular PRN  
 acetaminophen (TYLENOL) tablet 650 mg  650 mg Oral Q6H PRN  
 melatonin tablet 5 mg  5 mg Oral QHS PRN  
 ondansetron (ZOFRAN) injection 4 mg  4 mg IntraVENous Q4H PRN Review of Systems:  
Review of Systems Constitutional: Positive for malaise/fatigue. Negative for chills and fever. HENT: Negative. Eyes: Negative. Respiratory: Positive for cough, sputum production and shortness of breath. Negative for hemoptysis and wheezing. Cardiovascular: Negative. Gastrointestinal: Negative. Genitourinary: Negative. Musculoskeletal: Negative. Skin: Negative. Neurological: Positive for weakness. Negative for dizziness, focal weakness and headaches. Endo/Heme/Allergies: Negative. Psychiatric/Behavioral: Negative. Objective:  
Physical Exam:  
 
Visit Vitals /74 Pulse 90 Temp 98.1 °F (36.7 °C) Resp 21 Ht 5' 4.49\" (1.638 m) Wt 93.2 kg (205 lb 7.5 oz) SpO2 99% Breastfeeding No  
BMI 34.74 kg/m² O2 Flow Rate (L/min): 65 l/min O2 Device: Heated, Hi flow nasal cannula Temp (24hrs), Av.1 °F (36.7 °C), Min:98.1 °F (36.7 °C), Max:98.1 °F (36.7 °C) No intake/output data recorded.  1901 -  0700 In: 10 Out: Gaston Ivan [[id number]] General:         Appears ga bit lethargic, alert, not appearing   distressed EENT:              EOMI. Anicteric sclerae. MMM Resp:               Decreased air entry bilaterally + bilateral bibasilar crackles CV:                  Regular  rhythm, S1 plus S2, no murmurs rubs or   gallops  No edema GI:                   Soft, Non distended, Non tender.  +Bowel sounds Neurologic:       Alert and oriented X 2, normal speech, Psych:    fair insight. Not anxious nor agitated Skin:                No rashes. No jaundice. IV infiltrated rt forearm. Data Review:  
   
Recent Days: 
Recent Labs  
  04/21/21 
0400 04/20/21 
0400 04/19/21 
0945 WBC 9.9 11.0 12.4* HGB 11.7 12.0 12.9 HCT 35.4 37.1 38.6  184 186 Recent Labs  
  04/21/21 
0400 04/20/21 
0400 04/19/21 
0945   140 139 137  
K 3.3*  3.3* 4.2 3.4*  
  103 103 101 CO2 30  31 27 27 GLU 76  76 120* 136* BUN 24*  24* 25* 23* CREA 1.12*  1.15* 1.11* 1.23* CA 8.6  8.6 8.5 8.4* MG 2.1 1.5*  --   
ALB 2.6* 2.5* 2.7* TBILI 0.3 0.3 0.4 ALT 26 29 30 Recent Labs  
  04/20/21 
0417 04/19/21 
1250 04/19/21 
0420 PH 7.43 7.42 7.43  
PCO2 45 46* 46* PO2 58* 92 54* HCO3 28* 28* 29* FIO2 60.0 100.0 80.0  
 
 
24 Hour Results: 
Recent Results (from the past 24 hour(s)) GLUCOSE, POC Collection Time: 04/20/21 11:34 AM  
Result Value Ref Range Glucose (POC) 124 (H) 65 - 100 mg/dL Performed by [facility] GLUCOSE, POC Collection Time: 04/20/21  9:28 PM  
Result Value Ref Range Glucose (POC) 212 (H) 65 - 100 mg/dL Performed by Olvin Chiang METABOLIC PANEL, COMPREHENSIVE Collection Time: 04/21/21  4:00 AM  
Result Value Ref Range Sodium 140 136 - 145 mmol/L Potassium 3.3 (L) 3.5 - 5.1 mmol/L Chloride 103 97 - 108 mmol/L  
 CO2 30 21 - 32 mmol/L Anion gap 7 5 - 15 mmol/L Glucose 76 65 - 100 mg/dL BUN 24 (H) 6 - 20 mg/dL Creatinine 1.12 (H) 0.55 - 1.02 mg/dL BUN/Creatinine ratio 21 (H) 12 - 20 GFR est AA 58 (L) >60 ml/min/1.73m2 GFR est non-AA 48 (L) >60 ml/min/1.73m2 Calcium 8.6 8.5 - 10.1 mg/dL Bilirubin, total 0.3 0.2 - 1.0 mg/dL AST (SGOT) 30 15 - 37 U/L  
 ALT (SGPT) 26 12 - 78 U/L Alk. phosphatase 82 45 - 117 U/L Protein, total 6.0 (L) 6.4 - 8.2 g/dL Albumin 2.6 (L) 3.5 - 5.0 g/dL Globulin 3.4 2.0 - 4.0 g/dL A-G Ratio 0.8 (L) 1.1 - 2.2    
CBC WITH AUTOMATED DIFF Collection Time: 04/21/21  4:00 AM  
Result Value Ref Range WBC 9.9 3.6 - 11.0 K/uL  
 RBC 3.99 3.80 - 5.20 M/uL  
 HGB 11.7 11.5 - 16.0 g/dL HCT 35.4 35.0 - 47.0 % MCV 88.7 80.0 - 99.0 FL  
 MCH 29.3 26.0 - 34.0 PG  
 MCHC 33.1 30.0 - 36.5 g/dL  
 RDW 18.0 (H) 11.5 - 14.5 % PLATELET 652 272 - 029 K/uL MPV 10.7 8.9 - 12.9 FL  
 NEUTROPHILS 86 (H) 32 - 75 % LYMPHOCYTES 8 (L) 12 - 49 % MONOCYTES 4 (L) 5 - 13 % EOSINOPHILS 2 0 - 7 % BASOPHILS 0 0 - 1 % IMMATURE GRANULOCYTES 0 0.0 - 0.5 % ABS. NEUTROPHILS 8.5 (H) 1.8 - 8.0 K/UL  
 ABS. LYMPHOCYTES 0.8 0.8 - 3.5 K/UL  
 ABS. MONOCYTES 0.4 0.0 - 1.0 K/UL  
 ABS. EOSINOPHILS 0.2 0.0 - 0.4 K/UL  
 ABS. BASOPHILS 0.0 0.0 - 0.1 K/UL  
 ABS. IMM. GRANS. 0.0 0.00 - 0.04 K/UL  
 DF AUTOMATED METABOLIC PANEL, BASIC Collection Time: 04/21/21  4:00 AM  
Result Value Ref Range Sodium 140 136 - 145 mmol/L Potassium 3.3 (L) 3.5 - 5.1 mmol/L Chloride 103 97 - 108 mmol/L  
 CO2 31 21 - 32 mmol/L Anion gap 6 5 - 15 mmol/L Glucose 76 65 - 100 mg/dL BUN 24 (H) 6 - 20 mg/dL Creatinine 1.15 (H) 0.55 - 1.02 mg/dL BUN/Creatinine ratio 21 (H) 12 - 20 GFR est AA 56 (L) >60 ml/min/1.73m2 GFR est non-AA 46 (L) >60 ml/min/1.73m2 Calcium 8.6 8.5 - 10.1 mg/dL MAGNESIUM Collection Time: 04/21/21  4:00 AM  
Result Value Ref Range Magnesium 2.1 1.6 - 2.4 mg/dL Assessment/  
 
Patient Active Problem List  
Diagnosis Code  Ductal carcinoma in situ (DCIS) of left breast D05.12  Respiratory failure ([facility] [address] J96.90 Acute respiratory failure with hypoxia secondary to COVID-19 pna:  
-patient presented with acute respiratory failure with hypoxia requiring high flow nasal cannula for ventilatory support and based on patient's clinical presentation secondary to COVID-19 pneumonia, patient does not meet sepsis criteria at this time Completed Redemsivir Continue Decadron 6 mg IV every 8 Completed  azithromycin Continue to monitor respiratory status, moved to icu 4/18 am 2/2 fatigue, hypoxia on cpap, in icu appeared more settled down but transiently on bipap earlier 2/2 pulling mask off/agitation-settled down now and on hfnc sao2~94. Aggressive pulmonary rehabilitation Pulling off mask has been intermittent problem, needs close monitoring. Mitts were attempted though anxiety led to much increased anxiety/agitation. Pulmonology consult appreciated, Infectious disease consult appreciated Hypertensioncurrently resolved 
  
Hypothyroidism 
continue Synthroid 
  
Gastroesophageal reflux disease 
continue Protonix once daily 
  
Hyperglycemia due to type 2 diabetes  
likely iatrogenic/steroids, currently controlled 
continue lispro tid according to 24 hour coverage Continue insulin sliding scale Hypomagnesemiaresolved Hypokalemia - replete K and recheck K 
  
Depression/anxietycontinue home Zoloft 
  
Hx breast ca/meningioma. Xrt with breat ca 1.5 years ago. 
  
ProphylaxisLovenox FENcardiac diet, replete potassium and magnesium Full code, POA is daughter Zoila Saab [phone], updated at bedside. Dispositionpending course, slow wean. Possible disposition to LTAC (however not Lu[facility], discussed with Case Management to reach out to patients daughter) Critical care time spent 35 minutes involving direct patient care as well as reviewing patient's labs and coordination of care with nursing staff 
  
25.0 - 29.9 Overweight / Body mass index is 35.28 kg/m². Care Plan discussed with: Patient/Family, Nurse,  and Consultant . Ugo Menendez MD

## 2021-04-21 NOTE — PROGRESS NOTES
Incoming call from [employer], liaison at [facility], stating she spoke with patient's daughter Humberto Bradford this morning. Per [employer], they had a long conversation about LTACH as well as [employer] e-mailed Humberto Bradford information on [facility]. CM attempted to contact Humberto Bradford to follow up about LTACH this afternoon. There was no answer, voicemail was left with call back number.

## 2021-04-21 NOTE — PROGRESS NOTES
Infectious Disease Progress Note Subjective:  
Assessed pt at bedside. Doing well, denies new complaints. No acute events since last seen Objective:  
Physical Exam:  
 
Visit Vitals BP (!) 139/90 (BP 1 Location: Left upper arm, BP Patient Position: At rest) Pulse 96 Temp 98.2 °F (36.8 °C) Resp 20 Ht 5' 4.49\" (1.638 m) Wt 205 lb 7.5 oz (93.2 kg) SpO2 96% Breastfeeding No  
BMI 34.74 kg/m² O2 Flow Rate (L/min): 65 l/min O2 Device: Hi flow nasal cannula Temp (24hrs), Av.1 °F (36.7 °C), Min:98.1 °F (36.7 °C), Max:98.2 °F (36.8 °C) No intake/output data recorded.  1901 -  0700 In: 10 Out: Marlise Jaciel [[id number]] General: NAD, lethargic, confused HEENT: MICHELLE, Moist mucosa, on high flow Lungs: CTA b/l, no wheeze/rhonchi Heart: S1S2+, RRR, no murmur Abdo: Soft, NT, ND, +BS Exts: Trace edema, + pulses b/l  
Skin: No wounds, No rashes or lesions Data Review:  
   
Recent Days: 
Recent Labs  
  21 
0400 21 
0400 21 
0945 WBC 9.9 11.0 12.4* HGB 11.7 12.0 12.9 HCT 35.4 37.1 38.6  184 186 Recent Labs  
  21 
0400 21 
0400 21 
0945 BUN 24*  24* 25* 23* CREA 1.12*  1.15* 1.11* 1.23* Lab Results Component Value Date/Time C-Reactive protein 3.52 (H) 2021 04:00 AM  
  
Microbiology: None Diagnostics CXR Results  (Last 48 hours) 21 0430  XR CHEST PORT Final result Narrative:  Chest single view. Comparison single chest 2021 No change compared to prior imaging; diffuse alveolar opacities with air  
bronchograms persist. Cardiac and mediastinal structures unchanged. No  
pneumothorax or pleural effusion. 21 0245  XR CHEST PORT Final result Narrative:  Chest single view. Comparison single view chest 2021. Patchy alveolar opacities through the lungs, left lung more so than right.   
Cardiac and mediastinal structures unchanged. No pneumothorax or sizable pleural  
effusion. Assessment/Plan 1. COVID-19 pneumonitis, S/p Remdesivir and a dose of Tocilizumab Repeat test for COVID-19 neg on 04/17 Maintaining O2 sats on on High flow Afebrile, WBC normalized on todays labs On day # 5 of Zosyn for suspected aspiration PNA 2. Acute hypoxia due to # 1, Maintaining O2 sats on high flow 3. Acute on chronic renal failure 4. Intermittent confusion: Improved Tobias Roy MD  
 
4/21/2021

## 2021-04-21 NOTE — PROGRESS NOTES
Two eye skin assessment completed with Marjan RANKIN. Scattered bruising noted to bilateral arms. Excoriation noted under breast. Also to groin and perineum area. Z-gaurd and desitin Cream applied. Heel boots ordered but patient refuses to wear them.

## 2021-04-21 NOTE — PROGRESS NOTES
PHYSICAL THERAPY EVALUATION Patient: Shira Meade (29 y.o. female) Date: 4/21/2021 Primary Diagnosis: Respiratory failure ([facility] [address] [J96.90] Precautions: respiratory status, falls ASSESSMENT Patient is a 78year old female, who came to the ED with SOB for 1 week, diagnosed with COVID-19 one week PTA and admitted for respiratory failure and worsening symptoms from COVID-19 infection on 4/04/2021. Tested negative for COVID on 4/18/21, precautions removed. PMH includes: HTN, hypothyroidism, brain tumor, depression with anxiety, diabetes, GERD, cholecystectomy. 
  
Based on the objective data described below, the patient presents with decreased activity tolerance, generalized deconditioning, oxygen desaturation with mobility, using hi-flow O2, generalized weakness, increased need for A with amb and functional mobility/transfers. Patient A&O x4 and per pt report, pt lives alone in a ground floor private residence with no ANNALISA and no ramp. Patient reports using a rollator for mobility, DME includes: rollator, single-point cane and quad cane, and shower chair. Patient reports being independent for ADLs and mobility, gets weekly assist from family for IADLs such as cooking and cleaning. Patient does not use O2 at home, currently on hi-flow nasal cannula at 65 L/min and 85% O2. Patient semi supine in bed upon PT/OT arrival and agreeable to working with therapy. Patient required min A bed mobility, mod A x2 sup <> sit, mod A scooting. Patient initially unsteady sitting at EOB but fair sitting balance once positioned with feet on floor and hands on bed. Patient oxygen sensor not picking up well, once repositioned by nursing patient O2 in the low 80s, returned to semi supine and able to recover back into 90s. Further mobility held 2/2 poor respiratory status and limited activity tolerance.  Once patient returned to semi supine; performed self care in bed with OT, reviewed LE therex including ankle pumps, heelslides, and hip abduction/adduction Patient would benefit from continued skilled PT services to address above deficits and improve safety and independence with amb and functional mobility/transfers. Recommend discharge to LTAC vs SNF pending respiratory status when medically appropriate. Current Level of Function Impacting Discharge (mobility/balance): min to mod A x2 Other factors to consider for discharge: acute medical state, LOS, I at baseline PLAN : 
Recommendations and Planned Interventions: bed mobility training, transfer training, gait training, therapeutic exercises, neuromuscular re-education, patient and family training/education and therapeutic activities Frequency/Duration: Patient will be followed by physical therapy:  5 times a week to address goals. Recommendation for discharge: (in order for the patient to meet his/her long term goals) LTAC vs SNF pending respiratory status This discharge recommendation: 
Has been made in collaboration with the attending provider and/or case management IF patient discharges home will need the following DME: none SUBJECTIVE:  
Patient stated I need to use the bathroom.  multiple times after being informed lee catheter in place. OBJECTIVE DATA SUMMARY:  
HISTORY:   
Past Medical History:  
Diagnosis Date  Brain tumor (benign) ([facility] [address]  Depression with anxiety  Diabetes ([facility] [address]  GERD (gastroesophageal reflux disease)  Hypertension  Thyroid disease   
 hypothyroid Past Surgical History:  
Procedure Laterality Date  HX CHOLECYSTECTOMY Home Situation Home Environment: Private residence # Steps to Enter: 0 Wheelchair Ramp: No 
One/Two Story Residence: One story Living Alone: Yes Support Systems: Child(faye), Family member(s) Patient Expects to be Discharged to[de-identified] Skilled nursing facility Current DME Used/Available at Home: Tiki Areola, straight, Tiki Areola, quad, Walker, rollator, Shower chair Tub or Shower Type: Shower EXAMINATION/PRESENTATION/DECISION MAKING:  
Critical Behavior: 
Neurologic State: Alert Orientation Level: Oriented X4 Cognition: Follows commands, Poor safety awareness Safety/Judgement: Decreased awareness of environment, Decreased awareness of need for assistance Hearing: Auditory Auditory Impairment: None Skin:  Intact where visible Edema: none noted Range Of Motion: 
AROM: Generally decreased, functional 
  
  
  
  
  
  
  
Strength:   
Strength: Generally decreased, functional 
  
  
  
  
  
  
Tone & Sensation:  
Tone: Normal 
  
  
  
  
  
  
  
  
   
Coordination: 
  
Vision:  
Corrective Lenses: Glasses(not present at eval) Functional Mobility: 
Bed Mobility: 
Rolling: Minimum assistance Supine to Sit: Moderate assistance;Assist x2 Sit to Supine: Moderate assistance;Assist x2 Scooting: Moderate assistance Transfers: 
Sit to Stand: Other (comment)(not safe to complete at this time 2/2 respiratory status) Balance:  
Sitting: Impaired; With support Sitting - Static: Fair (occasional) Sitting - Dynamic: Fair (occasional) Ambulation/Gait Training: 
 not completed this session Therapeutic Exercises:  
Reviewed but officially completed Functional Measure: 
[address] [facility] [address] [address] AM-PAC 6 Clicks Basic Mobility Inpatient Short Form How much difficulty does the patient currently have. .. Unable A Lot A Little None 1. Turning over in bed (including adjusting bedclothes, sheets and blankets)? [] 1   [] 2   [x] 3   [] 4  
2. Sitting down on and standing up from a chair with arms ( e.g., wheelchair, bedside commode, etc.)   [] 1   [x] 2   [] 3   [] 4  
3. Moving from lying on back to sitting on the side of the bed? [] 1   [x] 2   [] 3   [] 4 How much help from another person does the patient currently need. .. Total A Lot A Little None 4. Moving to and from a bed to a chair (including a wheelchair)?    [] 1   [x] 2   [] 3   [] 4  
5. Need to walk in hospital room? [] 1   [x] 2   [] 3   [] 4  
6. Climbing 3-5 steps with a railing? [x] 1   [] 2   [] 3   [] 4  
© , [facility], under license to [employer]. All rights reserved Score:  Initial:  Most Recent: X (Date: 21 ) Interpretation of Tool:  Represents activities that are increasingly more difficult (i.e. Bed mobility, Transfers, Gait). Score 24 23 22-20 19-15 14-10 9-7 6 Modifier CH CI CJ CK CL CM CN Physical Therapy Evaluation Charge Determination History Examination Presentation Decision-Making HIGH Complexity :3+ comorbidities / personal factors will impact the outcome/ POC  HIGH Complexity : 4+ Standardized tests and measures addressing body structure, function, activity limitation and / or participation in recreation  MEDIUM Complexity : Evolving with changing characteristics  Other outcome measures ampac 6  mod Based on the above components, the patient evaluation is determined to be of the following complexity level: MEDIUM Pain Ratin/10 reported Activity Tolerance:  
Poor, desaturates with exertion and requires oxygen, requires frequent rest breaks, observed SOB with activity and signs and symptoms of orthostatic hypotension After treatment patient left in no apparent distress:  
Supine in bed, Call bell within reach, Bed / chair alarm activated and Side rails x 3 and nsg updated. GOALS: 
 
Problem: Mobility Impaired (Adult and Pediatric) Goal: *Acute Goals and Plan of Care (Insert Text) Description: Pt will be I with LE HEP in 7 days. Pt will perform bed mobility with mod I in 7 days. Pt will perform transfers with mod I in 7 days. Pt will amb 10-25 feet with LRAD safely with mod I in 7 days. Outcome: Not Met COMMUNICATION/EDUCATION:  
The patients plan of care was discussed with: Occupational therapist and Registered nurse.   
 
Fall prevention education was provided and the patient/caregiver indicated understanding., Patient/family have participated as able in goal setting and plan of care. and Patient/family agree to work toward stated goals and plan of care. PT/OT sessions occurred together for increased safety of pt and clinician. Thank you for this referral. 
Lian Sanchez, PT, DPT Time Calculation: 28 mins

## 2021-04-21 NOTE — PROGRESS NOTES
OCCUPATIONAL THERAPY EVALUATION Patient: Brigette Rangel (80 y.o. female) Date: 4/21/2021 Primary Diagnosis: Respiratory failure ([facility] [address] [J96.90] Precautions: Falls, previously COVID + now negative, hi-flow O2 
 
ASSESSMENT Patient is a 78year old female, who came to the ED with SOB for 1 week, diagnosed with COVID-19 one week PTA and admitted for respiratory failure and worsening symptoms from COVID-19 infection on 4/04/2021. PMH includes: HTN, hypothyroidism, brain tumor, depression with anxiety, diabetes, GERD, cholecystectomy. Based on the objective data described below, the patient presents with decreased activity tolerance, generalized deconditioning, oxygen desaturation with mobility, using hi-flow O2, generalized weakness, increased need for A with self care and functional mobility/transfers. Patient semi supine in bed upon OT/PT arrival and agreeable to working with therapy. Patient A&O x4 and per pt report, pt lives alone in a ground floor private residence with no ANNALISA and no ramp. Patient reports using a rollator for mobility, DME includes: rollator, single-point cane and quad cane, and shower chair. Patient reports being independent for ADLs and mobility, gets weekly assist from family for IADLs such as cooking and cleaning. Patient does not use O2 at home, currently on hi-flow nasal cannula at 65 L/min and 85% O2. Patient min A bed mobility, mod A x2 sup <> sit, mod A scooting. Patient initially unsteady sitting at EOB but fair sitting balance once positioned with feet on floor and hands on bed. Patient SBA drinking thickened liquids at EOB, total A LE dressing. Patient oxygen sensor not picking up well, once repositioned by nursing patient O2 in the low 80s, returned to semi supine and able to recover back into 90s. Further mobility held 2/2 poor respiratory status and limited activity tolerance.  Once patient returned to semi supine patient setup A grooming to perform oral hygiene and wash face.  Patient would benefit from continued skilled OT services to address above deficits and improve safety and independence with self care and functional mobility/transfers. Recommend discharge to LTAC vs SNF pending respiratory status when medically appropriate. Current Level of Function Impacting Discharge (ADLs/self-care): setup A grooming, SBA feeding, total A LE dressing. Other factors to consider for discharge: time since onset, PLOF, family support, severity of deficits PLAN : 
Recommendations and Planned Interventions: self care training, functional mobility training, therapeutic exercise, balance training, therapeutic activities, endurance activities, patient education and family training/education Frequency/Duration: Patient will be followed by occupational therapy 5 times a week to address goals. Recommendation for discharge: (in order for the patient to meet his/her long term goals) LTAC vs SNF This discharge recommendation: 
Has been made in collaboration with the attending provider and/or case management IF patient discharges home will need the following DME: TBD SUBJECTIVE:  
Patient stated I feel like I need to use the bathroom.  Patient redirected to having catheter in place after stating she needed to urinate. OBJECTIVE DATA SUMMARY:  
HISTORY:  
Past Medical History:  
Diagnosis Date  Brain tumor (benign) ([facility] [address]  Depression with anxiety  Diabetes ([facility] [address]  GERD (gastroesophageal reflux disease)  Hypertension  Thyroid disease   
 hypothyroid Past Surgical History:  
Procedure Laterality Date  HX CHOLECYSTECTOMY Expanded or extensive additional review of patient history:  
 
Home Situation Home Environment: Private residence # Steps to Enter: 0 Wheelchair Ramp: No 
One/Two Story Residence: One story Living Alone: Yes Support Systems: Child(faye), Family member(s) Patient Expects to be Discharged to[de-identified] Skilled nursing facility Current DME Used/Available at Home: Celena Grow, straight, Celena Grow, quad, Walker, rollator, Shower chair Tub or Shower Type: Shower PLOF: Pt I for ADLS, requiring A for IADLS, mod I with mobility prior to admission. Hand dominance: Right EXAMINATION OF PERFORMANCE DEFICITS: 
Cognitive/Behavioral Status: 
Neurologic State: Alert Orientation Level: Oriented X4 Cognition: Follows commands;Poor safety awareness Safety/Judgement: Decreased awareness of environment;Decreased awareness of need for assistance Skin: intact where visible Edema: none noted Hearing: Auditory Auditory Impairment: None Vision/Perceptual:   
Corrective Lenses: Glasses(not present at eval) Range of Motion: 
AROM: Generally decreased, functional 
 
Strength: 
Strength: Generally decreased, functional 
  
RUE Strength Observation: grossly observed to be 3/5 LUE Strength Observation: grossly observed to be 3/5 Coordination: 
Generally intact, not formally assessed Tone & Sensation: 
Tone: Normal 
 
Balance: 
Sitting: Impaired; With support Sitting - Static: Fair (occasional) Sitting - Dynamic: Fair (occasional) Functional Mobility and Transfers for ADLs: 
Bed Mobility: 
Rolling: Minimum assistance Supine to Sit: Moderate assistance;Assist x2 Sit to Supine: Moderate assistance;Assist x2 Scooting: Moderate assistance Transfers: 
Sit to Stand: Other (comment)(not safe to complete at this time 2/2 respiratory status) ADL Assessment: 
Feeding: Stand-by assistance Oral Facial Hygiene/Grooming: Setup Lower Body Dressing: Total assistance ADL Intervention and task modifications: 
Feeding Feeding Assistance: Stand-by assistance Drink to Mouth: Stand-by assistance Grooming Grooming Assistance: Set-up Position Performed: Other (comment)(semi supine with HOB raised) Washing Face: Set-up Brushing Teeth: Set-up Lower Body Dressing Assistance Dressing Assistance:  Total assistance(dependent) Socks: Total assistance (dependent) Leg Crossed Method Used: No 
Position Performed: Seated edge of bed Cognitive Retraining Safety/Judgement: Decreased awareness of environment;Decreased awareness of need for assistance Therapeutic Exercise: 
Patient may benefit from UE HEP, initiate at next session as able Functional Measure: 
 
[address] [place] [address] AM-PACTM \"6 Clicks\" Daily Activity Inpatient Short Form How much help from another person does the patient currently need. .. Total; A Lot A Little None 1. Putting on and taking off regular lower body clothing? [x]  1 []  2 []  3 []  4  
2. Bathing (including washing, rinsing, drying)? []  1 [x]  2 []  3 []  4  
3. Toileting, which includes using toilet, bedpan or urinal? [] 1 [x]  2 []  3 []  4  
4. Putting on and taking off regular upper body clothing? []  1 []  2 [x]  3 []  4  
5. Taking care of personal grooming such as brushing teeth? []  1 []  2 [x]  3 []  4  
6. Eating meals? []  1 []  2 [x]  3 []  4  
© 2007, Trustees of [address] [place] [address], under license to [employer]. All rights reserved Score: 14/24 Interpretation of Tool:  Represents clinically-significant functional categories (i.e. Activities of daily living). Percentage of Impairment CH 
 
0% 
 CI 
 
1-19% CJ 
 
20-39% CK 
 
40-59% CL 
 
60-79% CM 
 
80-99% CN  
 
100% [facility] Score 6-24 24 23 20-22 15-19 10-14 7-9 6 Occupational Therapy Evaluation Charge Determination History Examination Decision-Making LOW Complexity : Brief history review  MEDIUM Complexity : 3-5 performance deficits relating to physical, cognitive , or psychosocial skils that result in activity limitations and / or participation restrictions LOW Complexity : No comorbidities that affect functional and no verbal or physical assistance needed to complete eval tasks Based on the above components, the patient evaluation is determined to be of the following complexity level: LOW Pain Ratin/10 Activity Tolerance:  
Poor, desaturates with exertion and requires oxygen, requires frequent rest breaks, observed SOB with activity and signs and symptoms of orthostatic hypotension After treatment patient left in no apparent distress:   
Supine in bed, Call bell within reach, Bed / chair alarm activated and Side rails x 3 
 
COMMUNICATION/EDUCATION:  
The patients plan of care was discussed with: Physical therapist and Registered nurse. Patient/family have participated as able in goal setting and plan of care. and Patient/family agree to work toward stated goals and plan of care. This patients plan of care is appropriate for delegation to [facility] [place]. OT/PT sessions occurred together for increased patient and clinician safety as pt requires A of 2 for mobility at this time. Problem: Self Care Deficits Care Plan (Adult) Goal: *Acute Goals and Plan of Care (Insert Text) Description: Pt will be mod I sup <> sit in prep for EOB ADLs Pt will be mod I grooming sitting EOB Pt will be mod A LE dressing sitting EOB/long sit Pt will be mod I sit <>  prep for toileting LRAD Pt will be min A toileting/toilet transfer/cloth mgmt LRAD Pt will be I following UE HEP in prep for self care tasks Outcome: Not Met Thank you for this referral. 
Tabby West OTR/L Time Calculation: 28 mins

## 2021-04-22 NOTE — PROGRESS NOTES
Hospitalist Progress Note Daily Progress Note: 4/22/2021 62-year-old female with acute hypoxic respiratory failure secondary to Covid pneumonia. Subjective: The patient is seen for follow  up. Patient seen and evaluated at bedside, of note patient remains on high flow nasal cannula, patient currently complains of generalized weakness as well as a cough, slight improvement since yesterday, discussed with RN. Problem List: 
Problem List as of 4/22/2021 Date Reviewed: 11/21/2017 Codes Class Noted - Resolved Respiratory failure ([facility] [address] ICD-10-CM: J96.90 ICD-9-CM: 518.81  4/4/2021 - Present Ductal carcinoma in situ (DCIS) of left breast ICD-10-CM: D05.12 
ICD-9-CM: 233.0  11/21/2017 - Present Overview Addendum 11/21/2017  1:39 PM by Katherin Andrade MD  
  11/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 VA 6% Medications reviewed Current Facility-Administered Medications Medication Dose Route Frequency  potassium chloride (K-DUR, KLOR-CON) SR tablet 60 mEq  60 mEq Oral NOW  guaiFENesin ER (MUCINEX) tablet 600 mg  600 mg Oral Q12H  
 albuterol-ipratropium (DUO-NEB) 2.5 MG-0.5 MG/3 ML  3 mL Nebulization Q6H PRN  
 alum-mag hydroxide-simeth (MYLANTA) oral suspension 30 mL  30 mL Oral Q4H PRN  
 famotidine (PEPCID) tablet 20 mg  20 mg Oral DAILY  furosemide (LASIX) injection 40 mg  40 mg IntraVENous BID  desitin/nystatin (1:1) topical compound   Topical BID  dexamethasone (DECADRON) 4 mg/mL injection 6 mg  6 mg IntraVENous Q24H  hydrOXYzine pamoate (VISTARIL) capsule 25 mg  25 mg Oral QID PRN  piperacillin-tazobactam (ZOSYN) 3.375 g in 0.9% sodium chloride (MBP/ADV) 100 mL MBP  3.375 g IntraVENous Q8H  
 enoxaparin (LOVENOX) injection 90 mg  1 mg/kg SubCUTAneous Q12H  
 benzocaine-menthoL (CEPACOL) lozenge 1 Lozenge  1 Lozenge Mucous Membrane PRN  
 nystatin (MYCOSTATIN) 100,000 unit/mL oral suspension 500,000 Units  500,000 Units Oral QID  dextrose (D50W) injection syrg 12.5-25 g  25-50 mL IntraVENous PRN  
 insulin lispro (HUMALOG) injection   SubCUTAneous AC&HS  atorvastatin (LIPITOR) tablet 10 mg  10 mg Oral DAILY  levothyroxine (SYNTHROID) tablet 88 mcg  88 mcg Oral ACB  [Held by provider] lisinopril-hydroCHLOROthiazide (PRINZIDE, ZESTORETIC) 10-12.5 mg per tablet 1 Tab  1 Tab Oral DAILY  [Held by provider] meloxicam (MOBIC) tablet 7.5 mg  7.5 mg Oral DAILY  pantoprazole (PROTONIX) tablet 20 mg  20 mg Oral DAILY  sertraline (ZOLOFT) tablet 100 mg  100 mg Oral DAILY  cyclobenzaprine (FLEXERIL) tablet 10 mg  10 mg Oral TID  glucose chewable tablet 16 g  4 Tab Oral PRN  
 dextrose (D50W) injection syrg 12.5-25 g  25-50 mL IntraVENous PRN  
 glucagon (GLUCAGEN) injection 1 mg  1 mg IntraMUSCular PRN  
 acetaminophen (TYLENOL) tablet 650 mg  650 mg Oral Q6H PRN  
 melatonin tablet 5 mg  5 mg Oral QHS PRN  
 ondansetron (ZOFRAN) injection 4 mg  4 mg IntraVENous Q4H PRN Review of Systems:  
Review of Systems Constitutional: Positive for malaise/fatigue. Negative for chills and fever. HENT: Negative. Eyes: Negative. Respiratory: Positive for cough, sputum production and shortness of breath. Negative for hemoptysis and wheezing. Cardiovascular: Negative. Gastrointestinal: Negative. Genitourinary: Negative. Musculoskeletal: Negative. Skin: Negative. Neurological: Positive for weakness. Negative for dizziness, focal weakness and headaches. Endo/Heme/Allergies: Negative. Psychiatric/Behavioral: Negative. Objective:  
Physical Exam:  
 
Visit Vitals /85 Pulse 100 Temp 97.4 °F (36.3 °C) Resp 25 Ht 5' 4.49\" (1.638 m) Wt 92.7 kg (204 lb 5.9 oz) SpO2 98% Breastfeeding No  
BMI 34.55 kg/m² O2 Flow Rate (L/min): 65 l/min O2 Device: Hi flow nasal cannula Temp (24hrs), Av °F (36.7 °C), Min:97.4 °F (36.3 °C), Max:98.3 °F (36.8 °C)   No intake/output data recorded. 04/20 1901 - 04/22 0700 In: -  
Out: [id number] [[id number]] General:         Appears ga bit lethargic, alert, not appearing   distressed EENT:              EOMI. Anicteric sclerae. MMM Resp:               Decreased air entry bilaterally + bilateral bibasilar crackles CV:                  Regular  rhythm, S1 plus S2, no murmurs rubs or   gallops  No edema GI:                   Soft, Non distended, Non tender.  +Bowel sounds Neurologic:       Alert and oriented X 2, normal speech, Psych:    fair insight. Not anxious nor agitated Skin:                No rashes. No jaundice. IV infiltrated rt forearm. Data Review:  
   
Recent Days: 
Recent Labs  
  04/22/21 
0540 04/21/21 
0400 04/20/21 
0400 WBC 8.3 9.9 11.0 HGB 11.6 11.7 12.0 HCT 35.3 35.4 37.1  168 184 Recent Labs  
  04/22/21 
0540 04/21/21 
0400 04/20/21 
0400  140  140 139  
K 3.4* 3.3*  3.3* 4.2  103  103 103 CO2 32 30  31 27 * 76  76 120* BUN 20 24*  24* 25* CREA 1.09* 1.12*  1.15* 1.11* CA 8.5 8.6  8.6 8.5 MG  --  2.1 1.5* ALB 2.7* 2.6* 2.5* TBILI 0.2 0.3 0.3 ALT 26 26 29 Recent Labs  
  04/20/21 
0417 04/19/21 
1250 PH 7.43 7.42  
PCO2 45 46* PO2 58* 92 HCO3 28* 28* FIO2 60.0 100.0  
 
 
24 Hour Results: 
Recent Results (from the past 24 hour(s)) GLUCOSE, POC Collection Time: 04/21/21  9:02 AM  
Result Value Ref Range Glucose (POC) 87 65 - 100 mg/dL Performed by JACQUELIN CARMICHAEL   
GLUCOSE, POC Collection Time: 04/21/21 11:03 AM  
Result Value Ref Range Glucose (POC) 92 65 - 100 mg/dL Performed by JACQUELIN CARMICHAEL   
GLUCOSE, POC Collection Time: 04/21/21  3:12 PM  
Result Value Ref Range Glucose (POC) 124 (H) 65 - 100 mg/dL Performed by JACQUELIN CARMICHAEL   
GLUCOSE, POC Collection Time: 04/21/21  8:47 PM  
Result Value Ref Range Glucose (POC) 136 (H) 65 - 100 mg/dL Performed by Maci Salter METABOLIC PANEL, COMPREHENSIVE  Collection Time: 04/22/21  5:40 AM  
Result Value Ref Range Sodium 137 136 - 145 mmol/L Potassium 3.4 (L) 3.5 - 5.1 mmol/L Chloride 100 97 - 108 mmol/L  
 CO2 32 21 - 32 mmol/L Anion gap 5 5 - 15 mmol/L Glucose 137 (H) 65 - 100 mg/dL BUN 20 6 - 20 mg/dL Creatinine 1.09 (H) 0.55 - 1.02 mg/dL BUN/Creatinine ratio 18 12 - 20 GFR est AA 59 (L) >60 ml/min/1.73m2 GFR est non-AA 49 (L) >60 ml/min/1.73m2 Calcium 8.5 8.5 - 10.1 mg/dL Bilirubin, total 0.2 0.2 - 1.0 mg/dL AST (SGOT) 21 15 - 37 U/L  
 ALT (SGPT) 26 12 - 78 U/L Alk. phosphatase 85 45 - 117 U/L Protein, total 6.2 (L) 6.4 - 8.2 g/dL Albumin 2.7 (L) 3.5 - 5.0 g/dL Globulin 3.5 2.0 - 4.0 g/dL A-G Ratio 0.8 (L) 1.1 - 2.2    
CBC W/O DIFF Collection Time: 04/22/21  5:40 AM  
Result Value Ref Range WBC 8.3 3.6 - 11.0 K/uL  
 RBC 3.96 3.80 - 5.20 M/uL  
 HGB 11.6 11.5 - 16.0 g/dL HCT 35.3 35.0 - 47.0 % MCV 89.1 80.0 - 99.0 FL  
 MCH 29.3 26.0 - 34.0 PG  
 MCHC 32.9 30.0 - 36.5 g/dL  
 RDW 17.4 (H) 11.5 - 14.5 % PLATELET 965 974 - 062 K/uL MPV 9.9 8.9 - 12.9 FL Assessment/  
 
Patient Active Problem List  
Diagnosis Code  Ductal carcinoma in situ (DCIS) of left breast D05.12  Respiratory failure ([facility] [address] J96.90 Acute respiratory failure with hypoxia secondary to COVID-19 pna:  
-patient presented with acute respiratory failure with hypoxia requiring high flow nasal cannula for ventilatory support and based on patient's clinical presentation secondary to COVID-19 pneumonia, patient does not meet sepsis criteria at this time Completed Redemsivir Continue Decadron 6 mg IV daily Completed  azithromycin Continue to monitor respiratory status, moved to icu 4/18 am 2/2 fatigue, hypoxia on cpap, in icu appeared more settled down but transiently on bipap earlier 2/2 pulling mask off/agitation-settled down now and on hfnc sao2~94. Aggressive pulmonary rehabilitation Pulling off mask has been intermittent problem, needs close monitoring. Mitts were attempted though anxiety led to much increased anxiety/agitation. Pulmonology consult appreciated, Infectious disease consult appreciated Hypertensioncurrently resolved 
  
Hypothyroidism 
continue Synthroid 
  
Gastroesophageal reflux disease 
continue Protonix once daily 
  
Hyperglycemia due to type 2 diabetes  
likely iatrogenic/steroids, currently controlled 
continue lispro tid according to 24 hour coverage Continue insulin sliding scale Hypomagnesemiaresolved Hypokalemia - replete K and recheck K 
  
Depression/anxietycontinue home Zoloft 
  
Hx breast ca/meningioma. Xrt with breat ca 1.5 years ago. 
  
ProphylaxisLovenox FENcardiac diet, replete potassium and magnesium Full code, POA is daughter Yoly Carvajal [phone], updated at bedside. Dispositionpending course, slow wean. Possible disposition to LTAC Critical care time spent 35 minutes involving direct patient care as well as reviewing patient's labs and coordination of care with nursing staff 
  
25.0 - 29.9 Overweight / Body mass index is 35.28 kg/m². Care Plan discussed with: Patient/Family, Nurse,  and Consultant . Adalberto Garsia MD

## 2021-04-22 NOTE — PROGRESS NOTES
Problem: Dysphagia (Adult) Goal: *Acute Goals and Plan of Care (Insert Text) Description: Speech Therapy Goals Initiated 2021 
-Patient will participate in modified barium swallow study within 7 day(s). [ ] Not met  [ ]  MET   [ x] Progressing  [ ] Discontinue 
-Patient will tolerate dysphagia 2 diet with nectar thick liquids without signs/symptoms of aspiration given minimal cues within 7 day(s). [ ] Not met  [ ]  MET   [ x] Progressing  [ ] Discontinue 
-Patient will demonstrate understanding of swallow safety precautions and aspiration precautions, diet recs with minimal cues within 7 day(s). [ ] Not met  [ ]  MET   [ x] Progressing  [ ] [place] Outcome: Progressing Towards Goal 
 SPEECH LANGUAGE PATHOLOGY DYSPHAGIA TREATMENT Patient: Librado Valladares (3 y.o. female) Date: 2021 Diagnosis: Respiratory failure ([facility] [address] [J96.90] <principal problem not specified> Precautions: aspiration ASSESSMENT: 
Pt seen for tx, positioned upright in bed. Pt reports distaste for thickened liquids. Nsg reports coughing spell with water  previously this date, possibly thin. Otherwise, pt tolerating current diet without overt s/s aspiration. Pt given trials nectar via straw, puree, and soft solids. Oral phase with mildly reduced efficacy of mastication related to missing teeth; however, pt able to clear oral cavity. Pharyngeal phase with persistent mild delay, appears WFL once initiated. Dry cough x 1 with solids. No other s/s aspiration. PLAN: 
Recommendations and Planned Interventions: At this time, recommend cont currrent d2/nectar diet with 1:1 assistance with ALL PO intake, STRICT aspiration and GERD precautions, monitor pt closely for s/s aspiration, meds crushed nectar or puree, FEED ONLY IF AWAKE AND ALERT. Discussed with pt re: aspiration risk, diet recs and recs for MBS when pt is weaned from HFNC. Pt expresses understanding but reports wanting thin liquids.  Pt is agreeable to recs. Patient continues to benefit from skilled intervention to address the above impairments. Continue treatment per established plan of care. Frequency/Duration: Patient will be followed by speech-language pathology 4 times a week to address goals. Discharge Recommendations: To Be Determined SUBJECTIVE:  
Patient alert, agreeable. OBJECTIVE:  
 
CXR Results  (Last 48 hours) 04/22/21 0515  XR CHEST PORT Final result Impression:  1. There is persistent mixed interstitial and airspace disease diffusely  
throughout the lungs. There has been an interval decrease in the airspace  
pattern. These findings are consistent with an atypical pneumonia. 2.  The remainder of this examination is unchanged. Narrative:  Reason for Visit:   
HISTORY: Cough. Pneumonia. TECHNIQUE: Portable AP radiograph the chest dated 4/22/2021 obtained at 4:47 AM. COMPARISON: 4/21/2021 obtained at 4:23 AM. LIMITATIONS: Shallow inspiratory technique. Overlying EKG leads. TUBES/LINES:  None. HEART AND PULMONARY VESSELS: There is a borderline mildly enlarged cardiac  
silhouette. The pulmonary vessels are obscured by bilateral airspace disease. LUNG PARENCHYMA: There is multifocal mixed interstitial and airspace disease  
located diffusely throughout the lungs. There has been an interval decrease in  
the airspace disease. These findings are consistent with pneumonia. The  
differential diagnosis would include an atypical viral pneumonia given the  
peripheral and more diffuse nature of airspace disease. PLEURA: This examination is negative for larger pleural effusions or a  
pneumothorax. MEDIASTINUM: There is mild vascular ectasia of the thoracic aorta. BONE/SOFT TISSUES: This examination is negative for acute osseous abnormalities. 04/21/21 0430  XR CHEST PORT Final result Narrative:  Chest single view. Comparison single chest April 20, 2021 No change compared to prior imaging; diffuse alveolar opacities with air  
bronchograms persist. Cardiac and mediastinal structures unchanged. No  
pneumothorax or pleural effusion. Cognitive and Communication Status: 
Neurologic State: Alert Orientation Level: Oriented X4 Cognition: Follows commands, Appropriate decision making, Appropriate for age attention/concentration Safety/Judgement: Decreased awareness of environment, Decreased awareness of need for assistance Pain: 
Pain Scale 1: Numeric (0 - 10) Pain Intensity 1: 0 After treatment:  
Patient left in no apparent distress in bed, Call bell within reach, and Nursing notified COMMUNICATION/EDUCATION:  
Patient was educated regarding her deficit(s) of dysphagia, swallow safety precautions, diet recs and POC. She demonstrated Good understanding as evidenced by verbal responsiveness. The patient's plan of care including recommendations, planned interventions, and recommended diet changes were discussed with: Registered nurse. Makenna Prescott M.S. CCC-SLP Time Calculation: 12 mins

## 2021-04-22 NOTE — PROGRESS NOTES
Cm attempted to reach out to patient's daughter Светлана Winchester again today to discuss LTACH with no success. Voicemail was left again with call back number.

## 2021-04-22 NOTE — PROGRESS NOTES
PHYSICAL THERAPY TREATMENT Patient: Cherelle Brandon (89 y.o. female) Date: 4/22/2021 Diagnosis[facility] [J96.90] <principal problem not specified> Precautions:   
Chart, physical therapy assessment, plan of care and goals were reviewed. ASSESSMENT Patient continues with skilled PT services and is progressing towards goals. Patient semi supine in bed upon PT/OT entry and agreeable to participate with therapy, patient remains on hi-flow O2, currently set at 60 L/min and 75% O2. Patient requiring min A bed mobility, mod A of 1 sup -> sit and min A scooting. Patient able to sit EOB ~2-3minutes, c/o dizziness and oxygen dropped to 77% with RPE of 8/10. Patient with minimal improvement in dizziness while sitting EOB so returned to semi supine in bed. Patient then requesting to use a bedpan, patient total A for toileting including pericare. Noted patient with some redness/tenderness in gluteal and perineal folds so zinc paste applied. Patient then participated in LE therex, see grid below for details. Patient oxygen dropping into 80s with exercise so further exercise held but patient demonstrated 1-2 reps of each and verbalized understanding of LE HEP. Pt education with pursed lip breathing and use of incentive spirometer given; pt verbalized and demonstrates understanding. Continue to progress toward patient goals. Recommend discharge LTAC vs SNF when medically appropriate. Current Level of Function Impacting Discharge (mobility/balance): min to mod A; will require 2 person assist for OOB mobility Other factors to consider for discharge: acute medical state PLAN : 
Patient continues to benefit from skilled intervention to address the above impairments. Continue treatment per established plan of care to address goals. Recommendation for discharge: (in order for the patient to meet his/her long term goals) LTAC vs SNF This discharge recommendation: 
Has been made in collaboration with the attending provider and/or case management IF patient discharges home will need the following DME: none SUBJECTIVE:  
Patient stated i've got indegestion.  OBJECTIVE DATA SUMMARY:  
Critical Behavior: 
Neurologic State: Alert Orientation Level: Oriented X4 Cognition: Follows commands, Appropriate decision making, Appropriate for age attention/concentration Safety/Judgement: Decreased awareness of environment, Decreased awareness of need for assistance Functional Mobility Training: 
Bed Mobility: 
Rolling: Minimum assistance Supine to Sit: Moderate assistance;Assist x1 Sit to Supine: Minimum assistance Scooting: Minimum assistance Transfers: 
 not completed this session Balance: 
 seated balance:  
Static: fair Dynamic: poor Ambulation/Gait Training: 
 not completed this session Therapeutic Exercises:  
 
 
EXERCISE Sets Reps Active Active Assist  
Passive Self ROM Comments Ankle Pumps 1 10 [x] [] [] [] Quad Sets/Glut Sets   [] [] [] [] Hamstring Sets   [] [] [] [] Short Arc Quads   [] [] [] [] Heel Slides   [] [] [] [] Straight Leg Raises   [] [] [] [] Hip abd/add 1 10 [x] [] [] [] Long Arc Quads   [] [] [] [] Marching   [] [] [] []   
   [] [] [] []   
  
Pain Ratin-6/10 abdominal pain Activity Tolerance:  
Poor, desaturates with exertion and requires oxygen, requires rest breaks, observed SOB with activity and signs and symptoms of orthostatic hypotension After treatment patient left in no apparent distress:  
Supine in bed, Call bell within reach, Bed / chair alarm activated and Side rails x 3 and nsg updated GOALS: 
 
Problem: Mobility Impaired (Adult and Pediatric) Goal: *Acute Goals and Plan of Care (Insert Text) Description: Pt will be I with LE HEP in 7 days. Pt will perform bed mobility with mod I in 7 days. Pt will perform transfers with mod I in 7 days.   
Pt will amb 10-25 feet with LRAD safely with mod I in 7 days. Outcome: Progressing Towards Goal 
  
 
COMMUNICATION/COLLABORATION:  
The patients plan of care was discussed with: Occupational therapist and Registered nurse. PT/OT sessions occurred together for increased safety of pt and clinician. Shayy Mortensen PT, DPT Time Calculation: 30 mins

## 2021-04-22 NOTE — PROGRESS NOTES
Pulmonary and Critical Care Progress Note Subjective:  
 
Patient was transferred to ICU as she dropped her oxygen saturations on the floor. She reported she was getting fatigued. In the ICU she was placed on CPAP. Patient seen and examined. Discussed with the RN at the bedside. Patient is much more awake and alert today. Over the she was not on BiPAP, apparently she declined again. Currently she is on high flow oxygen 75%, 60 L. Improved from yesterday. Yesterday she was on 85%, 65 L flow. No distress. Her mentation has cleared. She is not agitated anymore. Taking p.o. without any problems swallowing. However her appetite is poor. BiPAP settings include AVAPS mode, 500 tidal volume, 60% oxygen. Chest x-ray continues to show bilateral patchy infiltrates, however there appears to be an improvement. Review of Systems: 
Limited review of system because patient is having difficulty to communicate with her CPAP. Current Facility-Administered Medications Medication Dose Route Frequency Provider Last Rate Last Admin  guaiFENesin ER (MUCINEX) tablet 600 mg  600 mg Oral Q12H León Cueva MD   600 mg at 04/22/21 0802  
 albuterol-ipratropium (DUO-NEB) 2.5 MG-0.5 MG/3 ML  3 mL Nebulization Q6H PRN Warden Shahida MD      
 alum-mag hydroxide-simeth (MYLANTA) oral suspension 30 mL  30 mL Oral Q4H PRN Winnie Marinelli MD      
 famotidine (PEPCID) tablet 20 mg  20 mg Oral DAILY Winnie Marinelli MD   20 mg at 04/22/21 2651  furosemide (LASIX) injection 40 mg  40 mg IntraVENous BID Warden Shahida MD   40 mg at 04/22/21 0801  
 desitin/nystatin (1:1) topical compound   Topical BID Garrett uSh MD   Given at 04/22/21 9855  dexamethasone (DECADRON) 4 mg/mL injection 6 mg  6 mg IntraVENous Q24H Reyes Osei MD   6 mg at 04/21/21 2000  hydrOXYzine pamoate (VISTARIL) capsule 25 mg  25 mg Oral QID PRN Kasi Maria MD   25 mg at 04/21/21 2114  piperacillin-tazobactam (ZOSYN) 3.375 g in 0.9% sodium chloride (MBP/ADV) 100 mL MBP  3.375 g IntraVENous Q8H Chelsea Fajardo MD 25 mL/hr at 04/22/21 0946 3.375 g at 04/22/21 0946  enoxaparin (LOVENOX) injection 90 mg  1 mg/kg SubCUTAneous Q12H Jenae CARRASCO MD   90 mg at 04/22/21 1054  benzocaine-menthoL (CEPACOL) lozenge 1 Lozenge  1 Lozenge Mucous Membrane PRN Donnie Gonzalez MD   1 Lozenge at 04/09/21 1640  nystatin (MYCOSTATIN) 100,000 unit/mL oral suspension 500,000 Units  500,000 Units Oral QID Donnie Gonzalez MD   500,000 Units at 04/22/21 0801  
 dextrose (D50W) injection syrg 12.5-25 g  25-50 mL IntraVENous PRN Donnie Gonzalez MD      
 insulin lispro (HUMALOG) injection   SubCUTAneous AC&HS Donnie Gonzalez MD   2 Units at 04/22/21 1054  atorvastatin (LIPITOR) tablet 10 mg  10 mg Oral DAILY Alban Page MD   10 mg at 04/22/21 3901  levothyroxine (SYNTHROID) tablet 88 mcg  88 mcg Oral ACB Alban Page MD   88 mcg at 04/22/21 9774  [Held by provider] lisinopril-hydroCHLOROthiazide (PRINZIDE, ZESTORETIC) 10-12.5 mg per tablet 1 Tab  1 Tab Oral DAILY Alban Page MD   Stopped at 04/15/21 0900  [Held by provider] meloxicam (MOBIC) tablet 7.5 mg  7.5 mg Oral DAILY Alban Page MD   Stopped at 04/19/21 0900  pantoprazole (PROTONIX) tablet 20 mg  20 mg Oral DAILY Alban Page MD   20 mg at 04/22/21 9199  sertraline (ZOLOFT) tablet 100 mg  100 mg Oral DAILY Alban Page MD   100 mg at 04/22/21 0365  cyclobenzaprine (FLEXERIL) tablet 10 mg  10 mg Oral TID Alban Page MD   10 mg at 04/22/21 0801  
 glucose chewable tablet 16 g  4 Tab Oral PRN Alban Page MD      
 dextrose (D50W) injection syrg 12.5-25 g  25-50 mL IntraVENous PRGLEN Page MD      
 glucagon [facility]) injection 1 mg  1 mg IntraMUSCular PRGLEN Page MD      
 acetaminophen (TYLENOL) tablet 650 mg  650 mg Oral Q6H PRN Austen Correia MD   650 mg at 21 1731  
 melatonin tablet 5 mg  5 mg Oral QHS PRN Austen Correia MD   5 mg at 214  
 ondansetron (ZOFRAN) injection 4 mg  4 mg IntraVENous Q4H PRN Austen Correia MD   4 mg at 21 9308 No Known Allergies Objective:  
 
Blood pressure 118/81, pulse 95, temperature 97.4 °F (36.3 °C), resp. rate 18, height 5' 4.49\" (1.638 m), weight 92.7 kg (204 lb 5.9 oz), SpO2 100 %, not currently breastfeeding. Temp (24hrs), Av.9 °F (36.6 °C), Min:97.4 °F (36.3 °C), Max:98.3 °F (36.8 °C) Intake and Output: 
Current Shift: No intake/output data recorded. Last 3 Shifts:  190 -  0700 In: -  
Out: 3775 [OFQD] Physical Exam:  
 
General: Lying in bed comfortably, no acute distress, on high flow oxygen. She is working with PT OT. Eye: Reactive, symmetric Throat and Neck: Supple, full facemask in place. Lung: Reduced air entry bilaterally with scattered bilateral crackles, improved. No wheezing. Heart: S1+S2. No murmurs. Abdomen: soft, non-tender. Bowel sounds normal. No masses; obese Extremities: No significant edema : Not done, Hyde catheter in place. She was given Lasix and is making decent urine output. Skin: No cyanosis Neurologic:  Currently awake and alert. Appears to be intact. Lab/Data Review: 
 
Recent Results (from the past 24 hour(s)) GLUCOSE, POC Collection Time: 21  3:12 PM  
Result Value Ref Range Glucose (POC) 124 (H) 65 - 100 mg/dL Performed by JACQUELIN CARMICHAEL   
GLUCOSE, POC Collection Time: 21  8:47 PM  
Result Value Ref Range Glucose (POC) 136 (H) 65 - 100 mg/dL Performed by [facility] METABOLIC PANEL, COMPREHENSIVE Collection Time: 21  5:40 AM  
Result Value Ref Range Sodium 137 136 - 145 mmol/L Potassium 3.4 (L) 3.5 - 5.1 mmol/L  Chloride 100 97 - 108 mmol/L  
 CO2 32 21 - 32 mmol/L  
 Anion gap 5 5 - 15 mmol/L Glucose 137 (H) 65 - 100 mg/dL BUN 20 6 - 20 mg/dL Creatinine 1.09 (H) 0.55 - 1.02 mg/dL BUN/Creatinine ratio 18 12 - 20 GFR est AA 59 (L) >60 ml/min/1.73m2 GFR est non-AA 49 (L) >60 ml/min/1.73m2 Calcium 8.5 8.5 - 10.1 mg/dL Bilirubin, total 0.2 0.2 - 1.0 mg/dL AST (SGOT) 21 15 - 37 U/L  
 ALT (SGPT) 26 12 - 78 U/L Alk. phosphatase 85 45 - 117 U/L Protein, total 6.2 (L) 6.4 - 8.2 g/dL Albumin 2.7 (L) 3.5 - 5.0 g/dL Globulin 3.5 2.0 - 4.0 g/dL A-G Ratio 0.8 (L) 1.1 - 2.2    
CBC W/O DIFF Collection Time: 04/22/21  5:40 AM  
Result Value Ref Range WBC 8.3 3.6 - 11.0 K/uL  
 RBC 3.96 3.80 - 5.20 M/uL  
 HGB 11.6 11.5 - 16.0 g/dL HCT 35.3 35.0 - 47.0 % MCV 89.1 80.0 - 99.0 FL  
 MCH 29.3 26.0 - 34.0 PG  
 MCHC 32.9 30.0 - 36.5 g/dL  
 RDW 17.4 (H) 11.5 - 14.5 % PLATELET 721 641 - 715 K/uL MPV 9.9 8.9 - 12.9 FL  
GLUCOSE, POC Collection Time: 04/22/21 10:46 AM  
Result Value Ref Range Glucose (POC) 143 (H) 65 - 100 mg/dL Performed by JACQUELIN CARMICHAEL   
 
 
XR CHEST PORT Final Result 1. There is persistent mixed interstitial and airspace disease diffusely  
throughout the lungs. There has been an interval decrease in the airspace  
pattern. These findings are consistent with an atypical pneumonia. 2.  The remainder of this examination is unchanged. XR CHEST PORT Final Result XR CHEST PORT Final Result XR CHEST PORT Final Result No significant interval change. XR CHEST PORT Final Result Increased moderate diffuse bilateral airspace opacities, likely a combination of  
edema and airspace disease. Developing ARDS not excluded. XR CHEST PORT Final Result XR CHEST PORT Final Result XR CHEST PORT Final Result XR CHEST PORT Final Result There is persistent airspace disease within the mid to lower lung  
zone predominance.  This represents a heterogeneous pattern as might be  
associated with an atypical pneumonia. There has been little interval change. XR CHEST PORT Final Result XR CHEST PORT Final Result XR CHEST PORT Final Result No significant interval change. XR CHEST PORT Final Result XR CHEST PORT    (Results Pending) XR CHEST PORT    (Results Pending) XR CHEST PORT    (Results Pending) CT Results  (Last 48 hours) None Assessment: 1. Acute respiratory failure with hypoxia 2. COVID-19 pneumonia 3. Shortness of breath 4.  Cough 5. Generalized weakness 6. Acute kidney injury 7. Obstructive sleep apnea on CPAP 
8. Obesity Plan:  
 
Transferred to ICU as she was getting fatigued and was dropping her sats On the morning of 4/19 she was on a 60% high flow oxygen but became agitated. Took it off and was desaturating. Given Ativan 1 mg and placed on BiPAP. This morning she is much more awake and alert. On 75%, 60 L flow. Oxygen requirement has improved from yesterday, 85% high flow oxygen, 65 L flow. Over the night she declined BiPAP, settings are AVAPS 500 tidal volume, O2 60%. Chest x-ray done today shows improvement in bilateral infiltrates. Blood gases done 4/20 morning on BiPAP 60% showed 7.4 3/45/58. Blood gases 4/19 morning showed on 60% high flow showed 7.43/46/54. Continue with diuresis. She is now negative for COVID-19. ID is also on the case. Continue with high flow oxygen, wean down as tolerated to keep saturation more than 92%. Use BiPAP as needed and then nocturnally. Patient has acute respiratory failure with hypoxia due to COVID-19 pneumonia Status post 5 days of remdesivir Currently on Decadron 6 mg IV every 24 hours Off azithromycin, on IV Zosyn per ID Status post tocilizumab on 4/5/2021 Vitamin C and zinc 
On therapeutic dose of Lovenox Echo normal LVEF Renal function stable today. Monitor renal function with fluid changes Check electrolytes and replace as needed DVT and GI prophylaxis Questions of patient were answered at bedside in detail Case discussed in detail with RN, RT, and care team 
Thank you for involving me in the care of this patient I will follow with you closely during hospitalization Overall prognosis appears to be guarded Greater than 30 minutes were spent in direct care with this patient. Nico Hudson MD 
[facility] 4/22/2021

## 2021-04-22 NOTE — PROGRESS NOTES
Infectious Disease Progress Note Subjective:  
Pt seen and examined at bedside. Doing well, denies new complaints. No acute events since last seen Objective:  
Physical Exam:  
 
Visit Vitals /81 (BP 1 Location: Left upper arm, BP Patient Position: At rest) Pulse 95 Temp 97.4 °F (36.3 °C) Resp 18 Ht 5' 4.49\" (1.638 m) Wt 204 lb 5.9 oz (92.7 kg) SpO2 100% Breastfeeding No  
BMI 34.55 kg/m² O2 Flow Rate (L/min): 60 l/min O2 Device: Hi flow nasal cannula Temp (24hrs), Av.9 °F (36.6 °C), Min:97.4 °F (36.3 °C), Max:98.3 °F (36.8 °C) No intake/output data recorded.  190 -  0700 In: -  
Out:  [VJNON:4191] General: NAD, lethargic, confused HEENT: MICHELLE, Moist mucosa, on high flow Lungs: CTA b/l, no wheeze/rhonchi Heart: S1S2+, RRR, no murmur Abdo: Soft, NT, ND, +BS Exts: Trace edema, + pulses b/l  
Skin: No wounds, No rashes or lesions Data Review:  
   
Recent Days: 
Recent Labs  
  21 
0540 21 
0400 21 
0400 WBC 8.3 9.9 11.0 HGB 11.6 11.7 12.0 HCT 35.3 35.4 37.1  168 184 Recent Labs  
  21 
0540 21 
0400 21 
0400 BUN 20 24*  24* 25* CREA 1.09* 1.12*  1.15* 1.11* Lab Results Component Value Date/Time C-Reactive protein 3.52 (H) 2021 04:00 AM  
  
Microbiology: None Diagnostics CXR Results  (Last 48 hours) 21 0515  XR CHEST PORT Final result Impression:  1. There is persistent mixed interstitial and airspace disease diffusely  
throughout the lungs. There has been an interval decrease in the airspace  
pattern. These findings are consistent with an atypical pneumonia. 2.  The remainder of this examination is unchanged. Narrative:  Reason for Visit:   
HISTORY: Cough. Pneumonia. TECHNIQUE: Portable AP radiograph the chest dated 2021 obtained at 4:47 AM. COMPARISON: 2021 obtained at 4:23 AM. LIMITATIONS: Shallow inspiratory technique. Overlying EKG leads. TUBES/LINES:  None. HEART AND PULMONARY VESSELS: There is a borderline mildly enlarged cardiac  
silhouette. The pulmonary vessels are obscured by bilateral airspace disease. LUNG PARENCHYMA: There is multifocal mixed interstitial and airspace disease  
located diffusely throughout the lungs. There has been an interval decrease in  
the airspace disease. These findings are consistent with pneumonia. The  
differential diagnosis would include an atypical viral pneumonia given the  
peripheral and more diffuse nature of airspace disease. PLEURA: This examination is negative for larger pleural effusions or a  
pneumothorax. MEDIASTINUM: There is mild vascular ectasia of the thoracic aorta. BONE/SOFT TISSUES: This examination is negative for acute osseous abnormalities. 04/21/21 0430  XR CHEST PORT Final result Narrative:  Chest single view. Comparison single chest April 20, 2021 No change compared to prior imaging; diffuse alveolar opacities with air  
bronchograms persist. Cardiac and mediastinal structures unchanged. No  
pneumothorax or pleural effusion. Assessment/Plan 1. COVID-19 pneumonitis, S/p appropriate Tx. Repeat test for COVID-19 neg on 04/17 neg On high flow with decreasing O2 requirements currently at 75% FiO2 Decreasing airspace disease on CXR (04/22) Afebrile, WBC normalized on today's lab On day # 6/10 of Zosyn for suspected aspiration PNA, d/c decadron after todays dose Routine labs in the morning 2. Intermittent confusion:  Clinically improved, mentation at baseline 3. Acute on chronic renal failure 4. H/o breast and benign brain Ca Tobias Roy MD  
 
4/22/2021

## 2021-04-22 NOTE — PROGRESS NOTES
OCCUPATIONAL THERAPY TREATMENT Patient: Rahat Vizcarra (10 y.o. female) Date: 4/22/2021 [facility]J96.90[facility] <principal problem not specified> Precautions: Falls, hi-flow O2 Chart, occupational therapy assessment, plan of care, and goals were reviewed. ASSESSMENT Patient continues with skilled OT services and is progressing towards goals. Patient semi supine in bed upon OT/PT entry and agreeable to participate with therapy, patient remains on hi-flow O2, currently set at 60 L/min and 75% O2. Patient agreeable to participate with therapy, patient requiring min A bed mobility, mod A of 1 sup -> sit and min A scooting. Patient able to sit EOB ~2-3minutes, c/o dizziness and oxygen dropped to 77% with perceived exertion of 8/10. Patient with minimal improvement in dizziness while sitting EOB so returned to semi supine in bed. Patient then requesting to use a bedpan, patient total A for toileting including pericare. Noted patient with some redness/tenderness in gluteal and perineal folds so zinc paste applied. Patient total A LE dressing and min A grooming to brush hair with HOB raised. Patient then participated in UE therex, see grid below for details. Patient oxygen dropping into 80s with exercise so further exercise held but patient demonstrated 1-2 reps of each and verbalized understanding of UE HEP. Continue to progress toward patient goals. Recommend discharge LTAC vs SNF when medically appropriate. Current Level of Function Impacting Discharge (ADLs): total A LE dressing and toileting, min A grooming Other factors to consider for discharge: time since onset, severity of deficits, poor activity tolerance, respiratory status PLAN : 
Patient continues to benefit from skilled intervention to address the above impairments. Continue treatment per established plan of care. to address goals. Recommend with staff: bed pan for toileting Recommend next OT session: sit <>  prep for [facility] transfer Recommendation for discharge: (in order for the patient to meet his/her long term goals) LTAC vs SNF This discharge recommendation: 
Has been made in collaboration with the attending provider and/or case management IF patient discharges home will need the following DME: TBD SUBJECTIVE:  
Patient stated I think I need to have a bowel movement.  OBJECTIVE DATA SUMMARY:  
Cognitive/Behavioral Status: 
Neurologic State: Alert Orientation Level: Oriented X4 Cognition: Follows commands; Appropriate decision making; Appropriate for age attention/concentration Functional Mobility and Transfers for ADLs: 
Bed Mobility: 
Rolling: Minimum assistance Supine to Sit: Moderate assistance;Assist x1 Sit to Supine: Minimum assistance Scooting: Minimum assistance Transfers: 
 
Balance: ADL Intervention: 
 
Grooming Grooming Assistance: Minimum assistance Position Performed: Other (comment)(semi supine with HOB raised) Brushing/Combing Hair: Minimum assistance Lower Body Dressing Assistance Dressing Assistance: Total assistance(dependent) Socks: Total assistance (dependent) Position Performed: Seated edge of bed Toileting Toileting Assistance: Total assistance(dependent) Bladder Hygiene: Total assistance (dependent) Bowel Hygiene: Total assistance (dependent) Therapeutic Exercises:  
Exercise Sets Reps AROM AAROM PROM Self PROM Comments Wrist flex/ext 1 15 [x] [] [] [] Patient demonstrated/verbalized understanding of shoulder and elbow flex/ext Pain: 
5/10 abdominal pain Activity Tolerance:  
Poor, desaturates with exertion and requires oxygen, requires frequent rest breaks, observed SOB with activity and signs and symptoms of orthostatic hypotension After treatment patient left in no apparent distress:  
Supine in bed, Call bell within reach, Bed / chair alarm activated and Side rails x 3 
 
COMMUNICATION/COLLABORATION:  
The patients plan of care was discussed with: Physical therapist, Registered nurse and Physician. OT/PT sessions occurred together for increased patient and clinician safety as pt with poor activity and would not tolerate x2. Problem: Self Care Deficits Care Plan (Adult) Goal: *Acute Goals and Plan of Care (Insert Text) Description: Pt will be mod I sup <> sit in prep for EOB ADLs Pt will be mod I grooming sitting EOB Pt will be mod A LE dressing sitting EOB/long sit Pt will be mod I sit <>  prep for toileting LRAD Pt will be min A toileting/toilet transfer/cloth mgmt LRAD Pt will be I following UE HEP in prep for self care tasks Outcome: Progressing Towards Goal 
  
Christiano Desai OTR/L Time Calculation: 31 mins

## 2021-04-23 NOTE — PROGRESS NOTES
Pulmonary and Critical Care Progress Note Subjective:  
 
Patient was transferred to ICU as she dropped her oxygen saturations on the floor. She reported she was getting fatigued. In the ICU she was placed on CPAP. Patient seen and examined. Discussed with the RN at the bedside. Patient is awake and alert. Over the she was not on BiPAP, apparently she declined again. Currently she is on high flow oxygen 90%, 60 L. Oxygen requirement has increased from yesterday. Yesterday she was on 75% high flow oxygen. No distress. Her mentation has cleared. She is not agitated anymore. Taking p.o. without any problems swallowing. However her appetite is poor. BiPAP settings include AVAPS mode, 500 tidal volume, 60% oxygen. Chest x-ray today continues to show bilateral patchy infiltrates, however there appears to be a slight improvement. Review of Systems: 
Limited review of system because patient is a poor historian. Current Facility-Administered Medications Medication Dose Route Frequency Provider Last Rate Last Admin  potassium chloride (K-DUR, KLOR-CON) SR tablet 60 mEq  60 mEq Oral NOW Winnie Marinelli MD      
 guaiFENesin ER [facility]) tablet 600 mg  600 mg Oral Q12H Rosario Orozco MD   600 mg at 04/23/21 5459  albuterol-ipratropium (DUO-NEB) 2.5 MG-0.5 MG/3 ML  3 mL Nebulization Q6H PRN León Cueva MD      
 alum-mag hydroxide-simeth (MYLANTA) oral suspension 30 mL  30 mL Oral Q4H PRN Winnie Marinelli MD      
 famotidine (PEPCID) tablet 20 mg  20 mg Oral DAILY Winnie Marinelli MD   20 mg at 04/23/21 1908  furosemide (LASIX) injection 40 mg  40 mg IntraVENous BID Rosario Orozco MD   40 mg at 04/23/21 0088  desitin/nystatin (1:1) topical compound   Topical BID Yobany Conroy MD   Given at 04/23/21 7145  hydrOXYzine pamoate (VISTARIL) capsule 25 mg  25 mg Oral QID PRN Mirna Dawson MD   25 mg at 04/21/21 3792  piperacillin-tazobactam (ZOSYN) 3.375 g in 0.9% sodium chloride (MBP/ADV) 100 mL MBP  3.375 g IntraVENous Q8H Chelsea Fajardo MD 25 mL/hr at 04/23/21 0235 3.375 g at 04/23/21 0235  enoxaparin (LOVENOX) injection 90 mg  1 mg/kg SubCUTAneous Q12H Gemma CARRASCO MD   90 mg at 04/22/21 2207  benzocaine-menthoL (CEPACOL) lozenge 1 Lozenge  1 Lozenge Mucous Membrane PRN Yusuf Cade MD   1 Lozenge at 04/09/21 1640  nystatin (MYCOSTATIN) 100,000 unit/mL oral suspension 500,000 Units  500,000 Units Oral QID Yusuf Cade MD   500,000 Units at 04/23/21 7059  dextrose (D50W) injection syrg 12.5-25 g  25-50 mL IntraVENous PRN Yusuf Cdae MD      
 insulin lispro (HUMALOG) injection   SubCUTAneous AC&HS Yusuf Cade MD   Stopped at 04/22/21 1630  
 atorvastatin (LIPITOR) tablet 10 mg  10 mg Oral DAILY Roberto Martinez MD   10 mg at 04/23/21 7755  levothyroxine (SYNTHROID) tablet 88 mcg  88 mcg Oral ACB Roberto Martinez MD   88 mcg at 04/23/21 6277  [Held by provider] lisinopril-hydroCHLOROthiazide (PRINZIDE, ZESTORETIC) 10-12.5 mg per tablet 1 Tab  1 Tab Oral DAILY Roberto Martinez MD   Stopped at 04/15/21 0900  [Held by provider] meloxicam (MOBIC) tablet 7.5 mg  7.5 mg Oral DAILY Roberto Martinez MD   Stopped at 04/19/21 0900  pantoprazole (PROTONIX) tablet 20 mg  20 mg Oral DAILY Roberto Martinez MD   20 mg at 04/23/21 9191  sertraline (ZOLOFT) tablet 100 mg  100 mg Oral DAILY Roberto Martinez MD   100 mg at 04/23/21 8857  cyclobenzaprine (FLEXERIL) tablet 10 mg  10 mg Oral TID Roberto Martinez MD   10 mg at 04/23/21 5447  
 glucose chewable tablet 16 g  4 Tab Oral PRN Roberto Martinez MD      
 dextrose (D50W) injection syrg 12.5-25 g  25-50 mL IntraVENous PRN Rboerto Martinez MD      
 glucagon [facility]) injection 1 mg  1 mg IntraMUSCular SHAYNE Martinez MD      
 acetaminophen (TYLENOL) tablet 650 mg  650 mg Oral Q6H PRN Julio Hess MD   650 mg at 21 1731  
 melatonin tablet 5 mg  5 mg Oral QHS PRN Julio Hess MD   5 mg at 21 2114  
 ondansetron (ZOFRAN) injection 4 mg  4 mg IntraVENous Q4H PRN Julio Hess MD   4 mg at 21 1350 No Known Allergies Objective:  
 
Blood pressure 122/72, pulse 82, temperature 97.7 °F (36.5 °C), resp. rate 14, height 5' 4.49\" (1.638 m), weight 92.7 kg (204 lb 5.9 oz), SpO2 96 %, not currently breastfeeding. Temp (24hrs), Av.3 °F (36.8 °C), Min:97.5 °F (36.4 °C), Max:98.9 °F (37.2 °C) Intake and Output: 
Current Shift: No intake/output data recorded. Last 3 Shifts:  1901 -  0700 In: -  
Out: 1500 [Urine:1500] Physical Exam:  
 
General: Lying in bed comfortably, no acute distress, on high flow oxygen. She is working with PT OT. Eye: Reactive, symmetric Throat and Neck: Supple, full facemask in place. Lung: Reduced air entry bilaterally with scattered bilateral crackles, improved. No wheezing. No significant change. Heart: S1+S2. No murmurs. Abdomen: soft, non-tender. Bowel sounds normal. No masses; obese Extremities: No significant edema : Not done, Hyde catheter in place. She was given Lasix and is making decent urine output. Skin: No cyanosis Neurologic:  Currently awake and alert. Appears to be intact. Lab/Data Review: 
 
Recent Results (from the past 24 hour(s)) GLUCOSE, POC Collection Time: 21  3:44 PM  
Result Value Ref Range Glucose (POC) 110 (H) 65 - 100 mg/dL Performed by JACQUELIN CARMICHAEL   
GLUCOSE, POC Collection Time: 21  9:55 PM  
Result Value Ref Range Glucose (POC) 93 65 - 100 mg/dL Performed by Eulalio Almendarez BLOOD GAS, ARTERIAL Collection Time: 21  3:30 AM  
Result Value Ref Range pH 7.43 7.35 - 7.45    
 PCO2 52 (H) 35 - 45 mmHg PO2 66 (L) 75 - 100 mmHg O2 SAT 94 (L) >95 %  BICARBONATE 33 (H) 22 - 26 mmol/L  
 BASE EXCESS 8.9 (H) 0 - 2 mmol/L  
 O2 METHOD High Flow O2    
 O2 FLOW RATE 60 L/min FIO2 75.0 % EPAP/CPAP/PEEP 0    
 SITE Right Radial    
 JACQUELIN'S TEST PASS METABOLIC PANEL, COMPREHENSIVE Collection Time: 04/23/21  4:40 AM  
Result Value Ref Range Sodium 137 136 - 145 mmol/L Potassium 3.4 (L) 3.5 - 5.1 mmol/L Chloride 99 97 - 108 mmol/L  
 CO2 33 (H) 21 - 32 mmol/L Anion gap 5 5 - 15 mmol/L Glucose 146 (H) 65 - 100 mg/dL BUN 18 6 - 20 mg/dL Creatinine 1.16 (H) 0.55 - 1.02 mg/dL BUN/Creatinine ratio 16 12 - 20 GFR est AA 55 (L) >60 ml/min/1.73m2 GFR est non-AA 46 (L) >60 ml/min/1.73m2 Calcium 8.6 8.5 - 10.1 mg/dL Bilirubin, total 0.4 0.2 - 1.0 mg/dL AST (SGOT) 20 15 - 37 U/L  
 ALT (SGPT) 25 12 - 78 U/L Alk. phosphatase 84 45 - 117 U/L Protein, total 6.4 6.4 - 8.2 g/dL Albumin 2.8 (L) 3.5 - 5.0 g/dL Globulin 3.6 2.0 - 4.0 g/dL A-G Ratio 0.8 (L) 1.1 - 2.2    
CBC W/O DIFF Collection Time: 04/23/21  4:40 AM  
Result Value Ref Range WBC 8.1 3.6 - 11.0 K/uL  
 RBC 4.13 3.80 - 5.20 M/uL  
 HGB 12.2 11.5 - 16.0 g/dL HCT 36.6 35.0 - 47.0 % MCV 88.6 80.0 - 99.0 FL  
 MCH 29.5 26.0 - 34.0 PG  
 MCHC 33.3 30.0 - 36.5 g/dL  
 RDW 17.9 (H) 11.5 - 14.5 % PLATELET 498 885 - 400 K/uL MPV 10.3 8.9 - 98.6 FL  
METABOLIC PANEL, BASIC Collection Time: 04/23/21  4:40 AM  
Result Value Ref Range Sodium 136 136 - 145 mmol/L Potassium 3.4 (L) 3.5 - 5.1 mmol/L Chloride 98 97 - 108 mmol/L  
 CO2 34 (H) 21 - 32 mmol/L Anion gap 4 (L) 5 - 15 mmol/L Glucose 147 (H) 65 - 100 mg/dL BUN 19 6 - 20 mg/dL Creatinine 1.17 (H) 0.55 - 1.02 mg/dL BUN/Creatinine ratio 16 12 - 20 GFR est AA 55 (L) >60 ml/min/1.73m2 GFR est non-AA 45 (L) >60 ml/min/1.73m2 Calcium 8.7 8.5 - 10.1 mg/dL MAGNESIUM Collection Time: 04/23/21  4:40 AM  
Result Value Ref Range Magnesium 1.4 (L) 1.6 - 2.4 mg/dL GLUCOSE, POC  Collection Time: 04/23/21  7:28 AM  
Result Value Ref Range Glucose (POC) 111 (H) 65 - 100 mg/dL Performed by Elroy Sue Final Result No significant interval change. XR CHEST PORT Final Result 1. There is persistent mixed interstitial and airspace disease diffusely  
throughout the lungs. There has been an interval decrease in the airspace  
pattern. These findings are consistent with an atypical pneumonia. 2.  The remainder of this examination is unchanged. XR CHEST PORT Final Result XR CHEST PORT Final Result XR CHEST PORT Final Result No significant interval change. XR CHEST PORT Final Result Increased moderate diffuse bilateral airspace opacities, likely a combination of  
edema and airspace disease. Developing ARDS not excluded. XR CHEST PORT Final Result XR CHEST PORT Final Result XR CHEST PORT Final Result XR CHEST PORT Final Result There is persistent airspace disease within the mid to lower lung  
zone predominance. This represents a heterogeneous pattern as might be  
associated with an atypical pneumonia. There has been little interval change. XR CHEST PORT Final Result XR CHEST PORT Final Result XR CHEST PORT Final Result No significant interval change. XR CHEST PORT Final Result XR CHEST PORT    (Results Pending) XR CHEST PORT    (Results Pending) XR CHEST PORT    (Results Pending) XR CHEST PORT    (Results Pending) CT Results  (Last 48 hours) None Assessment: 1. Acute respiratory failure with hypoxia 2. COVID-19 pneumonia 3. Shortness of breath 4.  Cough 5. Generalized weakness 6. Acute kidney injury 7. Obstructive sleep apnea on CPAP 
8. Obesity Plan:  
 
Transferred to ICU as she was getting fatigued and was dropping her sats On the morning of 4/19 she was on a 60% high flow oxygen but became agitated.   Took it off and was desaturating. Given Ativan 1 mg and placed on BiPAP. Overall she has been more awake and alert. She is now on 90% 60 L high flow oxygen. Oxygen requirements are increased. Yesterday she was on 75% high flow oxygen. However chest x-ray stable to perhaps slightly improved. We will continue with diuresis, antibiotics and steroids. ID is also on the case. I believe that it is going to be a slow improvement given post Covid fibrosis. Decadron 6 mg daily was stopped yesterday. I will resume at 4 mg p.o. daily. Over the night she declined BiPAP, settings are AVAPS 500 tidal volume, O2 60%. Blood gases done this morning showed 7.4 3/52/66 on 75% high flow. Wean down O2 as tolerated. Blood gases done 4/20 morning on BiPAP 60% showed 7.4 3/45/58. Blood gases 4/19 morning showed on 60% high flow showed 7.43/46/54. Continue with diuresis. She is now negative for COVID-19. ID is also on the case. Continue with high flow oxygen, wean down as tolerated to keep saturation more than 92%. Use BiPAP as needed and then nocturnally. Patient has acute respiratory failure with hypoxia due to COVID-19 pneumonia Status post 5 days of remdesivir Currently on Decadron 6 mg IV every 24 hours Off azithromycin, on IV Zosyn per ID Status post tocilizumab on 4/5/2021 Vitamin C and zinc 
On therapeutic dose of Lovenox Echo normal LVEF Renal function stable today. Monitor renal function with fluid changes Check electrolytes and replace as needed DVT and GI prophylaxis Questions of patient were answered at bedside in detail Case discussed in detail with RN, RT, and care team 
Thank you for involving me in the care of this patient I will follow with you closely during hospitalization Overall prognosis appears to be guarded Greater than 30 minutes were spent in direct care with this patient. Shirin Aguilar MD 
[facility] 4/23/2021

## 2021-04-23 NOTE — PROGRESS NOTES
OCCUPATIONAL THERAPY TREATMENT Patient: Hamilton Cook (32 y.o. female) Date: 4/23/2021 [facility] [J96.90] <principal problem not specified> Precautions: Falls, confusion, patient in mitts Chart, occupational therapy assessment, plan of care, and goals were reviewed. ASSESSMENT Patient continues with skilled OT services and is progressing towards goals. Patient semi supine in bed upon OT/PT entry and agreeable to participate in therapy. Patient in mohamud mitts, removed during session but replaced before exit. Per nursing report, patient has been pulling off hi-flow O2 and desaturating thus requiring restraints. Patient O2 increased to 90% since last session. Patient oxygen in 90s at rest, dropped to high 80s getting to EOB but recovered quickly to 90s prior to sit <> stand attempt. Oxygen at 94% or higher while standing, however, dropped to 79% once returned to sitting, recovered back to 97% once returned to semi supine. Patient min A bed mobility, min A sup -> sit, min A x2 sit -> sup, min A scooting. Patient total A LE dressing EOB to don socks, patient attempting but unable to bend down to reach feet. Patient min A x2 sit <> stand at EOB and able to stand ~15-20 seconds before requesting to sit. Noted patient had BM in bed upon standing so patient total A toileting at bed level once returned to supine and oxygen recovered. Continue to progress toward patient goals. Recommend discharge LTAC vs SNF when medically appropriate. Current Level of Function Impacting Discharge (ADLs): total A LE dressing, total A bed level toileting Other factors to consider for discharge: respiratory status, SOB, limited activity tolerance PLAN : 
Patient continues to benefit from skilled intervention to address the above impairments. Continue treatment per established plan of care. to address goals. Recommend with staff: bed pan for toileting Recommend next OT session: BSC transfer Recommendation for discharge: (in order for the patient to meet his/her long term goals) LTAC vs SNF This discharge recommendation: 
Has been made in collaboration with the attending provider and/or case management IF patient discharges home will need the following DME: TBD SUBJECTIVE:  
Patient stated I want to call my daughter.  patient assisted to call daughter at end of session. OBJECTIVE DATA SUMMARY:  
Cognitive/Behavioral Status: 
Neurologic State: Alert Orientation Level: Oriented X4 Cognition: Poor safety awareness;Memory loss; No command following Functional Mobility and Transfers for ADLs: 
Bed Mobility: 
Rolling: Minimum assistance Supine to Sit: Minimum assistance;Assist x1 Sit to Supine: Minimum assistance;Assist x2 Scooting: Minimum assistance Transfers: 
Sit to Stand: Minimum assistance;Assist x2 Balance: 
Sitting: Intact; With support Sitting - Static: Fair (occasional) Sitting - Dynamic: Fair (occasional) Standing: Impaired Standing - Static: Fair;Constant support ADL Intervention: Lower Body Dressing Assistance Dressing Assistance: Total assistance(dependent) Socks: Total assistance (dependent) Position Performed: Seated edge of bed Toileting Toileting Assistance: Total assistance(dependent) Bowel Hygiene: Total assistance (dependent) Clothing Management: Total assistance (dependent) Therapeutic Exercises:  
Not completed at this time due to SOB and fatigue Pain: 
0/10, some pain when coughing Activity Tolerance:  
Fair, desaturates with exertion and requires oxygen, requires frequent rest breaks and observed SOB with activity After treatment patient left in no apparent distress:  
Supine in bed, Call bell within reach, Bed / chair alarm activated, Side rails x 3 and Restraints COMMUNICATION/COLLABORATION:  
The patients plan of care was discussed with: Physical therapy assistant and Registered nurse.   
 
OT/PT sessions occurred together for increased patient and clinician safety as pt with poor activity and would not tolerate x2. JOSÉ present for ICU training. Problem: Self Care Deficits Care Plan (Adult) Goal: *Acute Goals and Plan of Care (Insert Text) Description: Pt will be mod I sup <> sit in prep for EOB ADLs Pt will be mod I grooming sitting EOB Pt will be mod A LE dressing sitting EOB/long sit Pt will be mod I sit <>  prep for toileting LRAD Pt will be min A toileting/toilet transfer/cloth mgmt LRAD Pt will be I following UE HEP in prep for self care tasks Outcome: Progressing Towards Goal 
  
Jerman Dural, OTR/L Time Calculation: 31 mins

## 2021-04-23 NOTE — PROGRESS NOTES
Infectious Disease Progress Note Subjective:  
Continues to do well clinically, remains on high flow oxygen, currently at 90% FiO2 with sats in the mid to upper 90s. Pt working with PT during my assessment. Denies new complaints Objective:  
Physical Exam:  
 
Visit Vitals /81 (BP 1 Location: Left upper arm, BP Patient Position: At rest) Pulse 82 Temp 97.7 °F (36.5 °C) Resp 23 Ht 5' 4.49\" (1.638 m) Wt 204 lb 5.9 oz (92.7 kg) SpO2 95% Breastfeeding No  
BMI 34.55 kg/m² O2 Flow Rate (L/min): 60 l/min O2 Device: Hi flow nasal cannula Temp (24hrs), Av.3 °F (36.8 °C), Min:97.7 °F (36.5 °C), Max:98.9 °F (37.2 °C) No intake/output data recorded.  1901 -  0700 In: -  
Out: 1500 [Urine:1500] General: NAD, lethargic, confused HEENT: MICHELLE, Moist mucosa, on high flow Lungs: CTA b/l, no wheeze/rhonchi Heart: S1S2+, RRR, no murmur Abdo: Soft, NT, ND, +BS Exts: Trace edema, + pulses b/l  
Skin: No wounds, No rashes or lesions Data Review:  
   
Recent Days: 
Recent Labs  
  21 
0440 21 
0540 21 
0400 WBC 8.1 8.3 9.9 HGB 12.2 11.6 11.7 HCT 36.6 35.3 35.4  154 168 Recent Labs  
  21 
0440 21 
0540 21 
0400 BUN 18  19 20 24*  24* CREA 1.16*  1.17* 1.09* 1.12*  1.15* Lab Results Component Value Date/Time C-Reactive protein 3.52 (H) 2021 04:00 AM  
  
Microbiology: None Diagnostics CXR Results  (Last 48 hours) 21 0345  XR CHEST PORT Final result Impression:  No significant interval change. Narrative:  Chest, frontal view, 2021 History: Respiratory failure. Comparison: Including chest 2021. Findings: The cardiac silhouette is stable. The lungs remain underexpanded. Diffuse opacities in the lungs are not significantly changed. No pleural  
effusion or pneumothorax is identified.   The osseous structures are stable. 04/22/21 0515  XR CHEST PORT Final result Impression:  1. There is persistent mixed interstitial and airspace disease diffusely  
throughout the lungs. There has been an interval decrease in the airspace  
pattern. These findings are consistent with an atypical pneumonia. 2.  The remainder of this examination is unchanged. Narrative:  Reason for Visit:   
HISTORY: Cough. Pneumonia. TECHNIQUE: Portable AP radiograph the chest dated 4/22/2021 obtained at 4:47 AM. COMPARISON: 4/21/2021 obtained at 4:23 AM. LIMITATIONS: Shallow inspiratory technique. Overlying EKG leads. TUBES/LINES:  None. HEART AND PULMONARY VESSELS: There is a borderline mildly enlarged cardiac  
silhouette. The pulmonary vessels are obscured by bilateral airspace disease. LUNG PARENCHYMA: There is multifocal mixed interstitial and airspace disease  
located diffusely throughout the lungs. There has been an interval decrease in  
the airspace disease. These findings are consistent with pneumonia. The  
differential diagnosis would include an atypical viral pneumonia given the  
peripheral and more diffuse nature of airspace disease. PLEURA: This examination is negative for larger pleural effusions or a  
pneumothorax. MEDIASTINUM: There is mild vascular ectasia of the thoracic aorta. BONE/SOFT TISSUES: This examination is negative for acute osseous abnormalities. Assessment/Plan 1. COVID-19 pneumonitis, S/p appropriate Tx. Repeat test for COVID-19 neg on 04/17 neg Remains on high flow O2, denies productive cough Persistent infiltrates on CXR. Remains afebrile w a normal WBC on routine labs On day # 7/10 of Zosyn for suspected aspiration PNA Continue pulmonary toilet, wean off O2 as tolerated 2. Intermittent confusion:  Clinically improved, mentation at baseline 3. Acute on chronic renal failure 4. Generalized weakness due to prolonged illness, being followed by PT/OT Héctor Doty MD  
 
4/23/2021

## 2021-04-23 NOTE — PROGRESS NOTES
Writer called patient's daughter Ramos Jurist [phone], Sarah's  answered the phone, stated Eileen is at work but he will take a message. Robert left contact number for Eileen to call back.

## 2021-04-23 NOTE — PROGRESS NOTES
PHYSICAL THERAPY TREATMENT Patient: Shira Meade (89 y.o. female) Date: 4/23/2021 Diagnosis: [facility] [J96.90] <principal problem not specified> Precautions:   
Chart, physical therapy assessment, plan of care and goals were reviewed. ASSESSMENT Patient continues with skilled PT services and is progressing towards goals. Patient pleasantly agreeable to therapy, now with mitts on due to reportedly pulling off high flow O2. Patient req less assistance today and demos improved tolerance to activity. Min A x1-2 for bed mobility and transfers to standing today. Patient Spo2 mostly maintained in the 90s however would dip down into mid 80s with conversating req cues for PLB and rest breaks. Pt stood well just became fatigued and SOB, SpO2 dropping more sitting EOB so then assisted to supine. Lowest reading on the screen 79% but began to rise with rest. Patient improving overall towards goals just remains incredibly fatigued SOB. Rec SNF vs LTAC after discharge from hospital and cont cotx due to activity tolerance. Will continue to progress. Current Level of Function Impacting Discharge (mobility/balance): weakness, min A x for safety. Other factors to consider for discharge: poor activity tolerance, SOB 
    
 
PLAN : 
Patient continues to benefit from skilled intervention to address the above impairments. Continue treatment per established plan of care. to address goals. Recommendation for discharge: (in order for the patient to meet his/her long term goals) SNF vs LTAC pending respiratory This discharge recommendation: 
Has been made in collaboration with the attending provider and/or case management IF patient discharges home will need the following DME: to be determined (TBD) SUBJECTIVE:  
Patient stated so why do I have to have these mitts on? OBJECTIVE DATA SUMMARY:  
Critical Behavior: 
Neurologic State: Alert Orientation Level: Oriented X4 Cognition: Poor safety awareness, Memory loss, No command following Safety/Judgement: Decreased awareness of environment, Decreased awareness of need for assistance Functional Mobility Training: 
Bed Mobility: 
Rolling: Minimum assistance Supine to Sit: Minimum assistance;Assist x1 Sit to Supine: Minimum assistance;Assist x2 Scooting: Minimum assistance Transfers: 
Sit to Stand: Minimum assistance;Assist x2 Stand to Sit: Minimum assistance;Assist x2 Balance: 
Sitting: Intact; With support Sitting - Static: Fair (occasional) Sitting - Dynamic: Fair (occasional) Standing: Impaired Standing - Static: Fair;Constant support Pain Ratin/10 Activity Tolerance:  
Fair, desaturates with exertion and requires oxygen, requires frequent rest breaks, and observed SOB with activity Please refer to the flowsheet for vital signs taken during this treatment. After treatment patient left in no apparent distress:  
Supine in bed and Call bell within reach, mitts applied. COMMUNICATION/COLLABORATION:  
The patients plan of care was discussed with: Occupational therapist and Registered nurse. Cotx with OT for increased safety and activity tolerance. Julian Fairbanks Time Calculation: 31 mins Problem: Mobility Impaired (Adult and Pediatric) Goal: *Acute Goals and Plan of Care (Insert Text) Description: Pt will be I with LE HEP in 7 days. Pt will perform bed mobility with mod I in 7 days. Pt will perform transfers with mod I in 7 days. Pt will amb 10-25 feet with LRAD safely with mod I in 7 days.   
 
 
Outcome: Progressing Towards Goal

## 2021-04-23 NOTE — PROGRESS NOTES
Hospitalist Progress Note Daily Progress Note: 4/23/2021 81-year-old female with acute hypoxic respiratory failure secondary to Covid pneumonia. Subjective: The patient is seen for follow  up. Patient seen and evaluated at bedside, of note patient remains on high flow nasal cannula, patient currently complains of generalized weakness as well as a cough, slight improvement since yesterday, discussed with RN. Problem List: 
Problem List as of 4/23/2021 Date Reviewed: 11/21/2017 Codes Class Noted - Resolved Respiratory failure ([facility] [address] ICD-10-CM: J96.90 ICD-9-CM: 518.81  4/4/2021 - Present Ductal carcinoma in situ (DCIS) of left breast ICD-10-CM: D05.12 
ICD-9-CM: 233.0  11/21/2017 - Present Overview Addendum 11/21/2017  1:39 PM by Alexei Chun MD  
  11/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 LA 6% Medications reviewed Current Facility-Administered Medications Medication Dose Route Frequency  potassium chloride (K-DUR, KLOR-CON) SR tablet 60 mEq  60 mEq Oral NOW  guaiFENesin ER (MUCINEX) tablet 600 mg  600 mg Oral Q12H  
 albuterol-ipratropium (DUO-NEB) 2.5 MG-0.5 MG/3 ML  3 mL Nebulization Q6H PRN  
 alum-mag hydroxide-simeth (MYLANTA) oral suspension 30 mL  30 mL Oral Q4H PRN  
 famotidine (PEPCID) tablet 20 mg  20 mg Oral DAILY  furosemide (LASIX) injection 40 mg  40 mg IntraVENous BID  desitin/nystatin (1:1) topical compound   Topical BID  hydrOXYzine pamoate (VISTARIL) capsule 25 mg  25 mg Oral QID PRN  piperacillin-tazobactam (ZOSYN) 3.375 g in 0.9% sodium chloride (MBP/ADV) 100 mL MBP  3.375 g IntraVENous Q8H  
 enoxaparin (LOVENOX) injection 90 mg  1 mg/kg SubCUTAneous Q12H  
 benzocaine-menthoL (CEPACOL) lozenge 1 Lozenge  1 Lozenge Mucous Membrane PRN  
 nystatin (MYCOSTATIN) 100,000 unit/mL oral suspension 500,000 Units  500,000 Units Oral QID  dextrose (D50W) injection syrg 12.5-25 g  25-50 mL IntraVENous PRN  
 insulin lispro (HUMALOG) injection   SubCUTAneous AC&HS  atorvastatin (LIPITOR) tablet 10 mg  10 mg Oral DAILY  levothyroxine (SYNTHROID) tablet 88 mcg  88 mcg Oral ACB  [Held by provider] lisinopril-hydroCHLOROthiazide (PRINZIDE, ZESTORETIC) 10-12.5 mg per tablet 1 Tab  1 Tab Oral DAILY  [Held by provider] meloxicam (MOBIC) tablet 7.5 mg  7.5 mg Oral DAILY  pantoprazole (PROTONIX) tablet 20 mg  20 mg Oral DAILY  sertraline (ZOLOFT) tablet 100 mg  100 mg Oral DAILY  cyclobenzaprine (FLEXERIL) tablet 10 mg  10 mg Oral TID  glucose chewable tablet 16 g  4 Tab Oral PRN  
 dextrose (D50W) injection syrg 12.5-25 g  25-50 mL IntraVENous PRN  
 glucagon (GLUCAGEN) injection 1 mg  1 mg IntraMUSCular PRN  
 acetaminophen (TYLENOL) tablet 650 mg  650 mg Oral Q6H PRN  
 melatonin tablet 5 mg  5 mg Oral QHS PRN  
 ondansetron (ZOFRAN) injection 4 mg  4 mg IntraVENous Q4H PRN Review of Systems:  
Review of Systems Constitutional: Positive for malaise/fatigue. Negative for chills and fever. HENT: Negative. Eyes: Negative. Respiratory: Positive for cough, sputum production and shortness of breath. Negative for hemoptysis and wheezing. Cardiovascular: Negative. Gastrointestinal: Negative. Genitourinary: Negative. Musculoskeletal: Negative. Skin: Negative. Neurological: Positive for weakness. Negative for dizziness, focal weakness and headaches. Endo/Heme/Allergies: Negative. Psychiatric/Behavioral: Negative. Objective:  
Physical Exam:  
 
Visit Vitals BP (!) 118/90 (BP 1 Location: Left upper arm, BP Patient Position: At rest) Pulse 82 Temp 97.7 °F (36.5 °C) Resp 19 Ht 5' 4.49\" (1.638 m) Wt 92.7 kg (204 lb 5.9 oz) SpO2 94% Breastfeeding No  
BMI 34.55 kg/m² O2 Flow Rate (L/min): 60 l/min O2 Device: Hi flow nasal cannula Temp (24hrs), Av.3 °F (36.8 °C), Min:97.5 °F (36.4 °C), Max:98.9 °F (37.2 °C) No intake/output data recorded. 04/21 1901 - 04/23 0700 In: -  
Out: 1500 [Urine:1500] General:         Appears ga bit lethargic, alert, not appearing   distressed EENT:              EOMI. Anicteric sclerae. MMM Resp:               Decreased air entry bilaterally + bilateral bibasilar crackles CV:                  Regular  rhythm, S1 plus S2, no murmurs rubs or   gallops  No edema GI:                   Soft, Non distended, Non tender.  +Bowel sounds Neurologic:       Alert and oriented X 2, normal speech, Psych:    fair insight. Not anxious nor agitated Skin:                No rashes. No jaundice. IV infiltrated rt forearm. Data Review:  
   
Recent Days: 
Recent Labs  
  04/23/21 
0440 04/22/21 
0540 04/21/21 
0400 WBC 8.1 8.3 9.9 HGB 12.2 11.6 11.7 HCT 36.6 35.3 35.4  154 168 Recent Labs  
  04/23/21 
0440 04/22/21 
0540 04/21/21 
0400   136 137 140  140  
K 3.4*  3.4* 3.4* 3.3*  3.3*  
CL 99  98 100 103  103 CO2 33*  34* 32 30  31 *  147* 137* 76  76 BUN 18  19 20 24*  24* CREA 1.16*  1.17* 1.09* 1.12*  1.15* CA 8.6  8.7 8.5 8.6  8.6 MG 1.4*  --  2.1 ALB 2.8* 2.7* 2.6* TBILI 0.4 0.2 0.3 ALT 25 26 26 Recent Labs  
  04/23/21 
0330 PH 7.43  
PCO2 52* PO2 66* HCO3 33* FIO2 75.0  
 
 
24 Hour Results: 
Recent Results (from the past 24 hour(s)) GLUCOSE, POC Collection Time: 04/22/21 10:46 AM  
Result Value Ref Range Glucose (POC) 143 (H) 65 - 100 mg/dL Performed by JACQUELIN CARMICHAEL   
GLUCOSE, POC Collection Time: 04/22/21  3:44 PM  
Result Value Ref Range Glucose (POC) 110 (H) 65 - 100 mg/dL Performed by JACQUELIN CARMICHAEL   
GLUCOSE, POC Collection Time: 04/22/21  9:55 PM  
Result Value Ref Range Glucose (POC) 93 65 - 100 mg/dL Performed by Kaleigh Franck BLOOD GAS, ARTERIAL Collection Time: 04/23/21  3:30 AM  
Result Value Ref Range pH 7.43 7.35 - 7.45    
 PCO2 52 (H) 35 - 45 mmHg PO2 66 (L) 75 - 100 mmHg O2 SAT 94 (L) >95 % BICARBONATE 33 (H) 22 - 26 mmol/L  
 BASE EXCESS 8.9 (H) 0 - 2 mmol/L  
 O2 METHOD High Flow O2    
 O2 FLOW RATE 60 L/min FIO2 75.0 % EPAP/CPAP/PEEP 0    
 SITE Right Radial    
 JACQUELIN'S TEST PASS METABOLIC PANEL, COMPREHENSIVE Collection Time: 04/23/21  4:40 AM  
Result Value Ref Range Sodium 137 136 - 145 mmol/L Potassium 3.4 (L) 3.5 - 5.1 mmol/L Chloride 99 97 - 108 mmol/L  
 CO2 33 (H) 21 - 32 mmol/L Anion gap 5 5 - 15 mmol/L Glucose 146 (H) 65 - 100 mg/dL BUN 18 6 - 20 mg/dL Creatinine 1.16 (H) 0.55 - 1.02 mg/dL BUN/Creatinine ratio 16 12 - 20 GFR est AA 55 (L) >60 ml/min/1.73m2 GFR est non-AA 46 (L) >60 ml/min/1.73m2 Calcium 8.6 8.5 - 10.1 mg/dL Bilirubin, total 0.4 0.2 - 1.0 mg/dL AST (SGOT) 20 15 - 37 U/L  
 ALT (SGPT) 25 12 - 78 U/L Alk. phosphatase 84 45 - 117 U/L Protein, total 6.4 6.4 - 8.2 g/dL Albumin 2.8 (L) 3.5 - 5.0 g/dL Globulin 3.6 2.0 - 4.0 g/dL A-G Ratio 0.8 (L) 1.1 - 2.2    
CBC W/O DIFF Collection Time: 04/23/21  4:40 AM  
Result Value Ref Range WBC 8.1 3.6 - 11.0 K/uL  
 RBC 4.13 3.80 - 5.20 M/uL  
 HGB 12.2 11.5 - 16.0 g/dL HCT 36.6 35.0 - 47.0 % MCV 88.6 80.0 - 99.0 FL  
 MCH 29.5 26.0 - 34.0 PG  
 MCHC 33.3 30.0 - 36.5 g/dL  
 RDW 17.9 (H) 11.5 - 14.5 % PLATELET 439 175 - 155 K/uL MPV 10.3 8.9 - 39.6 FL  
METABOLIC PANEL, BASIC Collection Time: 04/23/21  4:40 AM  
Result Value Ref Range Sodium 136 136 - 145 mmol/L Potassium 3.4 (L) 3.5 - 5.1 mmol/L Chloride 98 97 - 108 mmol/L  
 CO2 34 (H) 21 - 32 mmol/L Anion gap 4 (L) 5 - 15 mmol/L Glucose 147 (H) 65 - 100 mg/dL BUN 19 6 - 20 mg/dL Creatinine 1.17 (H) 0.55 - 1.02 mg/dL BUN/Creatinine ratio 16 12 - 20 GFR est AA 55 (L) >60 ml/min/1.73m2 GFR est non-AA 45 (L) >60 ml/min/1.73m2 Calcium 8.7 8.5 - 10.1 mg/dL MAGNESIUM Collection Time: 04/23/21  4:40 AM  
Result Value Ref Range  Magnesium 1.4 (L) 1.6 - 2.4 mg/dL GLUCOSE, POC Collection Time: 04/23/21  7:28 AM  
Result Value Ref Range Glucose (POC) 111 (H) 65 - 100 mg/dL Performed by Ami Humphrey Assessment/  
 
Patient Active Problem List  
Diagnosis Code  Ductal carcinoma in situ (DCIS) of left breast D05.12  Respiratory failure ([facility] [address] J96.90 Acute respiratory failure with hypoxia secondary to COVID-19 pna:  
-patient presented with acute respiratory failure with hypoxia requiring high flow nasal cannula for ventilatory support and based on patient's clinical presentation secondary to COVID-19 pneumonia, patient does not meet sepsis criteria at this time Completed Redemsivir Continue Decadron 6 mg IV daily Completed  azithromycin Continue to monitor respiratory status, moved to icu 4/18 am 2/2 fatigue, hypoxia on cpap, in icu appeared more settled down but transiently on bipap earlier 2/2 pulling mask off/agitation-settled down now and on hfnc sao2~94. Aggressive pulmonary rehabilitation Pulling off mask has been intermittent problem, needs close monitoring. Mitts were attempted though anxiety led to much increased anxiety/agitation. Pulmonology consult appreciated, Infectious disease consult appreciated Hypertensioncurrently resolved 
  
Hypothyroidism 
continue Synthroid 
  
Gastroesophageal reflux disease 
continue Protonix once daily 
  
Hyperglycemia due to type 2 diabetes  
likely iatrogenic/steroids, currently controlled 
continue lispro tid according to 24 hour coverage Continue insulin sliding scale Hypomagnesemiaresolved Hypokalemia - replete K and recheck K 
  
Depression/anxietycontinue home Zoloft 
  
Hx breast ca/meningioma. Xrt with breat ca 1.5 years ago. 
  
ProphylaxisLovenox FENcardiac diet, replete potassium and magnesium Full code, POA is daughter Ghada [place] [phone]. Dispositionpending course, slow wean. Possible disposition to LTAC Critical care time spent 35 minutes involving direct patient care as well as reviewing patient's labs and coordination of care with nursing staff 
  
25.0 - 29.9 Overweight / Body mass index is 35.28 kg/m². Care Plan discussed with: Patient/Family, Nurse,  and Consultant . José Miguel Flores MD

## 2021-04-24 NOTE — PROGRESS NOTES
Pulmonary and Critical Care Progress Note Subjective:  
 
Patient seen and examined in her room in the ICU this afternoon, no acute events overnight. Saturating very well on high flow nasal cannula 50 L, 50%. Seems to be better from a mentation standpoint as well. I discussed the case in detail with the bedside nursing staff and the respiratory therapist. 
 
Likely okay to transfer back to the floor tomorrow. Chest x-ray today continues to show bilateral patchy infiltrates, however there appears to be a slight improvement. Review of Systems: 
Limited review of system because patient is a poor historian. Current Facility-Administered Medications Medication Dose Route Frequency Provider Last Rate Last Admin  dexAMETHasone (DECADRON) tablet 4 mg  4 mg Oral DAILY León Cueva MD   4 mg at 04/24/21 1046  
 guaiFENesin ER (MUCINEX) tablet 600 mg  600 mg Oral Q12H León Cueva MD   600 mg at 04/24/21 1046  albuterol-ipratropium (DUO-NEB) 2.5 MG-0.5 MG/3 ML  3 mL Nebulization Q6H PRN León Cueva MD      
 alum-mag hydroxide-simeth (MYLANTA) oral suspension 30 mL  30 mL Oral Q4H PRN Winnie Marinelli MD      
 famotidine (PEPCID) tablet 20 mg  20 mg Oral DAILY Winnie Marinelli MD   20 mg at 04/24/21 1046  furosemide (LASIX) injection 40 mg  40 mg IntraVENous BID Delisa Bang MD   40 mg at 04/24/21 1046  desitin/nystatin (1:1) topical compound   Topical BID Ofelia Liz MD   Given at 04/24/21 1047  hydrOXYzine pamoate (VISTARIL) capsule 25 mg  25 mg Oral QID PRN Bony Tucker MD   25 mg at 04/21/21 2114  piperacillin-tazobactam (ZOSYN) 3.375 g in 0.9% sodium chloride (MBP/ADV) 100 mL MBP  3.375 g IntraVENous Q8H Chelsea Fajardo MD 25 mL/hr at 04/24/21 1047 3.375 g at 04/24/21 1047  enoxaparin (LOVENOX) injection 90 mg  1 mg/kg SubCUTAneous Q12H Ayan CARRASCO MD   90 mg at 04/24/21 1048  benzocaine-menthoL (CEPACOL) lozenge 1 Lozenge  1 Lozenge Mucous Membrane PRN Von Barrera MD   1 Lozenge at 04/09/21 1640  nystatin (MYCOSTATIN) 100,000 unit/mL oral suspension 500,000 Units  500,000 Units Oral QID Von Barrera MD   500,000 Units at 04/24/21 1046  dextrose (D50W) injection syrg 12.5-25 g  25-50 mL IntraVENous PRN Von Barrera MD      
 insulin lispro (HUMALOG) injection   SubCUTAneous AC&HS Von Barrera MD   Stopped at 04/22/21 1630  
 atorvastatin (LIPITOR) tablet 10 mg  10 mg Oral DAILY Florinda Saldana MD   10 mg at 04/24/21 1046  levothyroxine (SYNTHROID) tablet 88 mcg  88 mcg Oral ACB Florinda Saldana MD   88 mcg at 04/24/21 1046  [Held by provider] lisinopril-hydroCHLOROthiazide (PRINZIDE, ZESTORETIC) 10-12.5 mg per tablet 1 Tab  1 Tab Oral DAILY Florinda Saldana MD   Stopped at 04/15/21 0900  [Held by provider] meloxicam (MOBIC) tablet 7.5 mg  7.5 mg Oral DAILY Florinda Saldana MD   Stopped at 04/19/21 0900  pantoprazole (PROTONIX) tablet 20 mg  20 mg Oral DAILY Florinda Saldana MD   20 mg at 04/24/21 1046  sertraline (ZOLOFT) tablet 100 mg  100 mg Oral DAILY Florinda Saldana MD   100 mg at 04/24/21 1046  cyclobenzaprine (FLEXERIL) tablet 10 mg  10 mg Oral TID Florinda Saldana MD   10 mg at 04/24/21 1046  
 glucose chewable tablet 16 g  4 Tab Oral PRN Florinda Saldana MD      
 dextrose (D50W) injection syrg 12.5-25 g  25-50 mL IntraVENous PRN Florinda Saldana MD      
 glucagon (GLUCAGEN) injection 1 mg  1 mg IntraMUSCular PRN Florinda Saldana MD      
 acetaminophen (TYLENOL) tablet 650 mg  650 mg Oral Q6H PRN Florinda Saldana MD   650 mg at 04/21/21 1731  
 melatonin tablet 5 mg  5 mg Oral QHS PRN Florinda Saldana MD   5 mg at 04/21/21 2114  
 ondansetron (ZOFRAN) injection 4 mg  4 mg IntraVENous Q4H PRN Florinda Saldana MD   4 mg at 04/20/21 0744 No Known Allergies Objective:  
 
Blood pressure 113/83, pulse (!) 107, temperature 98.5 °F (36.9 °C), resp. rate 21, height 5' 4.49\" (1.638 m), weight 92.7 kg (204 lb 5.9 oz), SpO2 98 %, not currently breastfeeding. Temp (24hrs), Av.4 °F (36.9 °C), Min:97.3 °F (36.3 °C), Max:99 °F (37.2 °C) Intake and Output: 
Current Shift: No intake/output data recorded. Last 3 Shifts: 1901 -  0700 In: 10 Out: 1900 [[id number]] Physical Exam:  
 
General: Lying in bed comfortably, no acute distress, on high flow oxygen. Eye: Reactive, symmetric Throat and Neck: Supple, full facemask in place. Lung: Reduced air entry bilaterally with scattered bilateral crackles, improved. No wheezing. No significant change. Heart: S1+S2. No murmurs. Abdomen: soft, non-tender. Bowel sounds normal. No masses; obese Extremities: No significant edema : Not done, Hyde catheter in place. She was given Lasix and is making decent urine output. Skin: No cyanosis Neurologic:  Currently awake and alert. Appears to be intact. Lab/Data Review: 
 
Recent Results (from the past 24 hour(s)) GLUCOSE, POC Collection Time: 21  3:23 PM  
Result Value Ref Range Glucose (POC) 132 (H) 65 - 100 mg/dL Performed by Aung Arboleda, POC Collection Time: 21  9:01 PM  
Result Value Ref Range Glucose (POC) 108 (H) 65 - 100 mg/dL Performed by Rhonda Large Collection Time: 21  3:23 AM  
Result Value Ref Range Sodium 138 136 - 145 mmol/L Potassium 3.2 (L) 3.5 - 5.1 mmol/L Chloride 98 97 - 108 mmol/L  
 CO2 32 21 - 32 mmol/L Anion gap 8 5 - 15 mmol/L Glucose 142 (H) 65 - 100 mg/dL BUN 21 (H) 6 - 20 mg/dL Creatinine 1.17 (H) 0.55 - 1.02 mg/dL BUN/Creatinine ratio 18 12 - 20 GFR est AA 55 (L) >60 ml/min/1.73m2 GFR est non-AA 45 (L) >60 ml/min/1.73m2 Calcium 9.0 8.5 - 10.1 mg/dL Bilirubin, total 0.4 0.2 - 1.0 mg/dL  AST (SGOT) 23 15 - 37 U/L  
 ALT (SGPT) 23 12 - 78 U/L Alk. phosphatase 75 45 - 117 U/L Protein, total 6.6 6.4 - 8.2 g/dL Albumin 3.0 (L) 3.5 - 5.0 g/dL Globulin 3.6 2.0 - 4.0 g/dL A-G Ratio 0.8 (L) 1.1 - 2.2    
CBC W/O DIFF Collection Time: 04/24/21  3:23 AM  
Result Value Ref Range WBC 9.6 3.6 - 11.0 K/uL  
 RBC 4.38 3.80 - 5.20 M/uL  
 HGB 12.8 11.5 - 16.0 g/dL HCT 38.6 35.0 - 47.0 % MCV 88.1 80.0 - 99.0 FL  
 MCH 29.2 26.0 - 34.0 PG  
 MCHC 33.2 30.0 - 36.5 g/dL  
 RDW 18.0 (H) 11.5 - 14.5 % PLATELET 899 073 - 003 K/uL MPV 10.8 8.9 - 12.9 FL  
GLUCOSE, POC Collection Time: 04/24/21 10:00 AM  
Result Value Ref Range Glucose (POC) 134 (H) 65 - 100 mg/dL Performed by Taryn Guadalupe, POC Collection Time: 04/24/21 12:31 PM  
Result Value Ref Range Glucose (POC) 159 (H) 65 - 100 mg/dL Performed by Vignesh Darnell   
 
 
XR CHEST PORT Final Result Bilateral airspace disease and small left pleural effusion. XR CHEST PORT Final Result No significant interval change. XR CHEST PORT Final Result 1. There is persistent mixed interstitial and airspace disease diffusely  
throughout the lungs. There has been an interval decrease in the airspace  
pattern. These findings are consistent with an atypical pneumonia. 2.  The remainder of this examination is unchanged. XR CHEST PORT Final Result XR CHEST PORT Final Result XR CHEST PORT Final Result No significant interval change. XR CHEST PORT Final Result Increased moderate diffuse bilateral airspace opacities, likely a combination of  
edema and airspace disease. Developing ARDS not excluded. XR CHEST PORT Final Result XR CHEST PORT Final Result XR CHEST PORT Final Result XR CHEST PORT Final Result There is persistent airspace disease within the mid to lower lung  
zone predominance.  This represents a heterogeneous pattern as might be  
associated with an atypical pneumonia. There has been little interval change. XR CHEST PORT Final Result XR CHEST PORT Final Result XR CHEST PORT Final Result No significant interval change. XR CHEST PORT Final Result XR CHEST PORT    (Results Pending) XR CHEST PORT    (Results Pending) XR CHEST PORT    (Results Pending) XR CHEST PORT    (Results Pending) CT Results  (Last 48 hours) None Assessment: 1. Acute respiratory failure with hypoxia 2. COVID-19 pneumonia 3. Shortness of breath 4.  Cough 5. Generalized weakness 6. Acute kidney injury 7. Obstructive sleep apnea on CPAP 
8. Obesity Plan:  
 
Likely okay to transfer out of the ICU tomorrow. Saturating well on high flow nasal cannula 50 L, 50%,  we are moving closer towards getting her to a nasal cannula. On the morning of 4/19 she was on a 60% high flow oxygen but became agitated. Took it off and was desaturating. Given Ativan 1 mg and placed on BiPAP. We will continue with diuresis, antibiotics and steroids. ID is also on the case. I believe that it is going to be a slow improvement given post Covid fibrosis. Still on Decadron, this can likely be stopped at any time. Continue with diuresis. She is now negative for COVID-19. ID is also on the case. Continue with high flow oxygen, wean down as tolerated to keep saturation more than 92%. Use BiPAP as needed and then nocturnally. Patient has acute respiratory failure with hypoxia due to COVID-19 pneumonia Status post 5 days of remdesivir Currently on Decadron 4 mg p.o. every 24 hours Off azithromycin, on IV Zosyn per ID Status post tocilizumab on 4/5/2021 Vitamin C and zinc 
On therapeutic dose of Lovenox Echo normal LVEF Renal function stable today. Monitor renal function with fluid changes Check electrolytes and replace as needed DVT and GI prophylaxis Questions of patient were answered at bedside in detail Case discussed in detail with RN, RT, and care team 
Thank you for involving me in the care of this patient I will follow with you closely during hospitalization Overall prognosis appears to be guarded Greater than 30 minutes were spent in direct care with this patient. Victorino Barron DO 
[facility] 4/24/2021

## 2021-04-24 NOTE — PROGRESS NOTES
Bedside and Verbal shift change report given to Suraj Finney RN (oncoming nurse) by Samantha Parsons  (offgoing nurse). Report included the following information SBAR, Kardex, Intake/Output, MAR, Accordion, Recent Results, Med Rec Status and Cardiac Rhythm SR/ST.

## 2021-04-24 NOTE — PROGRESS NOTES
Hospitalist Progress Note Daily Progress Note: 4/24/2021 79-year-old female with acute hypoxic respiratory failure secondary to Covid pneumonia. Subjective: The patient is seen for follow  up. Patient seen and evaluated at bedside, of note patient remains on high flow nasal cannula, patient currently complains of generalized weakness as well as a cough, slight improvement since yesterday, discussed with RN. Problem List: 
Problem List as of 4/24/2021 Date Reviewed: 11/21/2017 Codes Class Noted - Resolved Respiratory failure ([facility] [address] ICD-10-CM: J96.90 ICD-9-CM: 518.81  4/4/2021 - Present Ductal carcinoma in situ (DCIS) of left breast ICD-10-CM: D05.12 
ICD-9-CM: 233.0  11/21/2017 - Present Overview Addendum 11/21/2017  1:39 PM by Duane Aguirre MD  
  11/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 WV 6% Medications reviewed Current Facility-Administered Medications Medication Dose Route Frequency  potassium chloride (K-DUR, KLOR-CON) SR tablet 80 mEq  80 mEq Oral NOW  dexAMETHasone (DECADRON) tablet 4 mg  4 mg Oral DAILY  guaiFENesin ER (MUCINEX) tablet 600 mg  600 mg Oral Q12H  
 albuterol-ipratropium (DUO-NEB) 2.5 MG-0.5 MG/3 ML  3 mL Nebulization Q6H PRN  
 alum-mag hydroxide-simeth (MYLANTA) oral suspension 30 mL  30 mL Oral Q4H PRN  
 famotidine (PEPCID) tablet 20 mg  20 mg Oral DAILY  furosemide (LASIX) injection 40 mg  40 mg IntraVENous BID  desitin/nystatin (1:1) topical compound   Topical BID  hydrOXYzine pamoate (VISTARIL) capsule 25 mg  25 mg Oral QID PRN  piperacillin-tazobactam (ZOSYN) 3.375 g in 0.9% sodium chloride (MBP/ADV) 100 mL MBP  3.375 g IntraVENous Q8H  
 enoxaparin (LOVENOX) injection 90 mg  1 mg/kg SubCUTAneous Q12H  
 benzocaine-menthoL (CEPACOL) lozenge 1 Lozenge  1 Lozenge Mucous Membrane PRN  
 nystatin (MYCOSTATIN) 100,000 unit/mL oral suspension 500,000 Units  500,000 Units Oral QID  dextrose (D50W) injection syrg 12.5-25 g  25-50 mL IntraVENous PRN  
 insulin lispro (HUMALOG) injection   SubCUTAneous AC&HS  atorvastatin (LIPITOR) tablet 10 mg  10 mg Oral DAILY  levothyroxine (SYNTHROID) tablet 88 mcg  88 mcg Oral ACB  [Held by provider] lisinopril-hydroCHLOROthiazide (PRINZIDE, ZESTORETIC) 10-12.5 mg per tablet 1 Tab  1 Tab Oral DAILY  [Held by provider] meloxicam (MOBIC) tablet 7.5 mg  7.5 mg Oral DAILY  pantoprazole (PROTONIX) tablet 20 mg  20 mg Oral DAILY  sertraline (ZOLOFT) tablet 100 mg  100 mg Oral DAILY  cyclobenzaprine (FLEXERIL) tablet 10 mg  10 mg Oral TID  glucose chewable tablet 16 g  4 Tab Oral PRN  
 dextrose (D50W) injection syrg 12.5-25 g  25-50 mL IntraVENous PRN  
 glucagon (GLUCAGEN) injection 1 mg  1 mg IntraMUSCular PRN  
 acetaminophen (TYLENOL) tablet 650 mg  650 mg Oral Q6H PRN  
 melatonin tablet 5 mg  5 mg Oral QHS PRN  
 ondansetron (ZOFRAN) injection 4 mg  4 mg IntraVENous Q4H PRN Review of Systems:  
Review of Systems Constitutional: Positive for malaise/fatigue. Negative for chills and fever. HENT: Negative. Eyes: Negative. Respiratory: Positive for cough, sputum production and shortness of breath. Negative for hemoptysis and wheezing. Cardiovascular: Negative. Gastrointestinal: Negative. Genitourinary: Negative. Musculoskeletal: Negative. Skin: Negative. Neurological: Positive for weakness. Negative for dizziness, focal weakness and headaches. Endo/Heme/Allergies: Negative. Psychiatric/Behavioral: Negative. Objective:  
Physical Exam:  
 
Visit Vitals /71 (BP 1 Location: Left upper arm, BP Patient Position: At rest) Pulse (!) 102 Temp 98.7 °F (37.1 °C) Resp 21 Ht 5' 4.49\" (1.638 m) Wt 92.7 kg (204 lb 5.9 oz) SpO2 99% Breastfeeding No  
BMI 34.55 kg/m² O2 Flow Rate (L/min): 55 l/min O2 Device: Heated, Hi flow nasal cannula Temp (24hrs), Av.2 °F (36.8 °C), Min:97.3 °F (36.3 °C), Max:99 °F (37.2 °C) No intake/output data recorded. 04/22 1901 - 04/24 0700 In: 10 Out: 1900 [[id number]] General:         Appears ga bit lethargic, alert, not appearing   distressed EENT:              EOMI. Anicteric sclerae. MMM Resp:               Decreased air entry bilaterally + bilateral bibasilar crackles CV:                  Regular  rhythm, S1 plus S2, no murmurs rubs or   gallops  No edema GI:                   Soft, Non distended, Non tender.  +Bowel sounds Neurologic:       Alert and oriented X 2, normal speech, Psych:    fair insight. Not anxious nor agitated Skin:                No rashes. No jaundice. IV infiltrated rt forearm. Data Review:  
   
Recent Days: 
Recent Labs  
  04/24/21 
0323 04/23/21 
0440 04/22/21 
0540 WBC 9.6 8.1 8.3 HGB 12.8 12.2 11.6 HCT 38.6 36.6 35.3  186 154 Recent Labs  
  04/24/21 
0323 04/23/21 
0440 04/22/21 
0540  137  136 137  
K 3.2* 3.4*  3.4* 3.4*  
CL 98 99  98 100 CO2 32 33*  34* 32 * 146*  147* 137* BUN 21* 18  19 20 CREA 1.17* 1.16*  1.17* 1.09* CA 9.0 8.6  8.7 8.5 MG  --  1.4*  --   
ALB 3.0* 2.8* 2.7* TBILI 0.4 0.4 0.2 ALT 23 25 26 Recent Labs  
  04/23/21 
0330 PH 7.43  
PCO2 52* PO2 66* HCO3 33* FIO2 75.0  
 
 
24 Hour Results: 
Recent Results (from the past 24 hour(s)) GLUCOSE, POC Collection Time: 04/23/21 11:26 AM  
Result Value Ref Range Glucose (POC) 116 (H) 65 - 100 mg/dL Performed by Shook, POC Collection Time: 04/23/21  3:23 PM  
Result Value Ref Range Glucose (POC) 132 (H) 65 - 100 mg/dL Performed by Shook, POC Collection Time: 04/23/21  9:01 PM  
Result Value Ref Range Glucose (POC) 108 (H) 65 - 100 mg/dL Performed by Karolina Brower Collection Time: 04/24/21  3:23 AM  
Result Value Ref Range Sodium 138 136 - 145 mmol/L  Potassium 3.2 (L) 3.5 - 5.1 mmol/L Chloride 98 97 - 108 mmol/L  
 CO2 32 21 - 32 mmol/L Anion gap 8 5 - 15 mmol/L Glucose 142 (H) 65 - 100 mg/dL BUN 21 (H) 6 - 20 mg/dL Creatinine 1.17 (H) 0.55 - 1.02 mg/dL BUN/Creatinine ratio 18 12 - 20 GFR est AA 55 (L) >60 ml/min/1.73m2 GFR est non-AA 45 (L) >60 ml/min/1.73m2 Calcium 9.0 8.5 - 10.1 mg/dL Bilirubin, total 0.4 0.2 - 1.0 mg/dL AST (SGOT) 23 15 - 37 U/L  
 ALT (SGPT) 23 12 - 78 U/L Alk. phosphatase 75 45 - 117 U/L Protein, total 6.6 6.4 - 8.2 g/dL Albumin 3.0 (L) 3.5 - 5.0 g/dL Globulin 3.6 2.0 - 4.0 g/dL A-G Ratio 0.8 (L) 1.1 - 2.2    
CBC W/O DIFF Collection Time: 04/24/21  3:23 AM  
Result Value Ref Range WBC 9.6 3.6 - 11.0 K/uL  
 RBC 4.38 3.80 - 5.20 M/uL  
 HGB 12.8 11.5 - 16.0 g/dL HCT 38.6 35.0 - 47.0 % MCV 88.1 80.0 - 99.0 FL  
 MCH 29.2 26.0 - 34.0 PG  
 MCHC 33.2 30.0 - 36.5 g/dL  
 RDW 18.0 (H) 11.5 - 14.5 % PLATELET 023 054 - 791 K/uL MPV 10.8 8.9 - 12.9 FL Assessment/  
 
Patient Active Problem List  
Diagnosis Code  Ductal carcinoma in situ (DCIS) of left breast D05.12  Respiratory failure ([facility] [address] J96.90 Acute respiratory failure with hypoxia secondary to COVID-19 pna:  
-patient presented with acute respiratory failure with hypoxia requiring high flow nasal cannula for ventilatory support and based on patient's clinical presentation secondary to COVID-19 pneumonia, patient does not meet sepsis criteria at this time Completed Redemsivir Continue Decadron 6 mg IV daily Completed  azithromycin Continue to monitor respiratory status, moved to icu 4/18 am 2/2 fatigue, hypoxia on cpap, in icu appeared more settled down but transiently on bipap earlier 2/2 pulling mask off/agitation-settled down now and on hfnc sao2~94. Aggressive pulmonary rehabilitation Pulling off mask has been intermittent problem, needs close monitoring.  Mitts were attempted though anxiety led to much increased anxiety/agitation. Pulmonology consult appreciated, Infectious disease consult appreciated Hypertensioncurrently resolved 
  
Hypothyroidism 
continue Synthroid 
  
Gastroesophageal reflux disease 
continue Protonix once daily 
  
Hyperglycemia due to type 2 diabetes  
likely iatrogenic/steroids, currently controlled 
continue lispro tid according to 24 hour coverage Continue insulin sliding scale Hypomagnesemiaresolved Hypokalemia - replete K and recheck K 
  
Depression/anxietycontinue home Zoloft 
  
Hx breast ca/meningioma. Xrt with breat ca 1.5 years ago. 
  
ProphylaxisLovenox FENcardiac diet, replete potassium and magnesium Full code, POA is daughter Charolette Heimlich [phone]. Dispositionpending course, slow wean. Possible disposition to LTAC Critical care time spent 35 minutes involving direct patient care as well as reviewing patient's labs and coordination of care with nursing staff 
  
25.0 - 29.9 Overweight / Body mass index is 35.28 kg/m². Care Plan discussed with: Patient/Family, Nurse,  and Consultant . Milagro Vasquez MD

## 2021-04-24 NOTE — PROGRESS NOTES
Problem: Mobility Impaired (Adult and Pediatric) Goal: *Acute Goals and Plan of Care (Insert Text) Description: Pt will be I with LE HEP in 7 days. Pt will perform bed mobility with mod I in 7 days. Pt will perform transfers with mod I in 7 days. Pt will amb 10-25 feet with LRAD safely with mod I in 7 days. Outcome: Not Progressing Towards Goal 
 PHYSICAL THERAPY TREATMENT Patient: Stefanie Chapin (77 y.o. female) Date: 4/24/2021 Diagnosis: Respiratory failure ([facility] [address] [J96.90] <principal problem not specified> Precautions:   
Chart, physical therapy assessment, plan of care and goals were reviewed. ASSESSMENT Patient continues with skilled PT services and is not progressing towards goals. Pt performed bed mobility with min A of 1 this tx. Maintained EOB sitting ~3min, became very SOB, and c/o chest tightness. Requested to return to supine, O2 sats read 60, unsure of accuracy. Assisted pt to supine, educated through PLB. While sitting EOB pt had loose stools. After lengthy recovery, pt able to perform rolling with min A of 1 for PTA to assist with cleaning/changing linens, however desat again, required another lengthy recovery, nursing present in room to assist with finishing to clean pt/ reposition in bed. Unable to tolerated further tx today. Current Level of Function Impacting Discharge (mobility/balance): Limited mobility. Other factors to consider for discharge: Limited activity tolerane. PLAN : 
Patient continues to benefit from skilled intervention to address the above impairments. Continue treatment per established plan of care. to address goals. Recommendation for discharge: (in order for the patient to meet his/her long term goals) To be determined: LTAC v SNF SUBJECTIVE:  
Patient stated I feel okay.  OBJECTIVE DATA SUMMARY:  
Critical Behavior: 
Neurologic State: Alert Orientation Level: Oriented X4 Cognition: Follows commands, Decreased attention/concentration Safety/Judgement: Decreased awareness of environment, Decreased awareness of need for assistance Functional Mobility Training: 
Bed Mobility: 
Rolling: Minimum assistance Supine to Sit: Minimum assistance Sit to Supine: Assist x1;Minimum assistance Scooting: Minimum assistance Transfers: 
  
  
     
  
     
  
  
  
  
Balance: 
Sitting: Intact; With support Sitting - Static: Fair (occasional) Pain Ratin/10 Activity Tolerance:  
Poor, desaturates with exertion and requires oxygen, requires frequent rest breaks, and observed SOB with activity Please refer to the flowsheet for vital signs taken during this treatment. After treatment patient left in no apparent distress:  
Supine in bed, Call bell within reach, and nurse present COMMUNICATION/COLLABORATION:  
The patients plan of care was discussed with: Physical therapist and Registered nurse. Renee Quintana Time Calculation: 54 mins

## 2021-04-25 NOTE — PROGRESS NOTES
Pulmonary and Critical Care Progress Note Subjective:  
 
Patient seen and examined in her room in the ICU this afternoon, no acute events overnight. Saturating very well on high flow nasal cannula 55 L, 50%. Seems to be better from a mentation standpoint as well however she appears to be sundowning at night. I discussed the case in detail with the bedside nursing staff and the respiratory therapist. 
 
[employer] to transfer to the floor whenever felt appropriate by the primary team. 
 
Magnesium low 1.4, being repleted by the primary team.  Repeat electrolytes in the morning. Chest x-ray today continues to show bilateral patchy infiltrates, however there appears to be a slight improvement. Review of Systems: 
Limited review of system because patient is a poor historian. Current Facility-Administered Medications Medication Dose Route Frequency Provider Last Rate Last Admin  magnesium sulfate 1 gram/100 mL IVPB premix pgbk  guaiFENesin ER (MUCINEX) tablet 600 mg  600 mg Oral Q12H León Cueva MD   600 mg at 04/25/21 0849  
 albuterol-ipratropium (DUO-NEB) 2.5 MG-0.5 MG/3 ML  3 mL Nebulization Q6H PRN Delisa Bang MD      
 alum-mag hydroxide-simeth (MYLANTA) oral suspension 30 mL  30 mL Oral Q4H PRN Ofelia Liz MD      
 famotidine (PEPCID) tablet 20 mg  20 mg Oral DAILY Winnie Marinelli MD   20 mg at 04/25/21 0128  furosemide (LASIX) injection 40 mg  40 mg IntraVENous BID Delisa Bang MD   40 mg at 04/25/21 2496  desitin/nystatin (1:1) topical compound   Topical BID Ofelia Liz MD   Given at 04/25/21 5068  hydrOXYzine pamoate (VISTARIL) capsule 25 mg  25 mg Oral QID PRN Ayan CARRASCO MD   25 mg at 04/24/21 2219  piperacillin-tazobactam (ZOSYN) 3.375 g in 0.9% sodium chloride (MBP/ADV) 100 mL MBP  3.375 g IntraVENous Q8H Chelsea Fajardo MD 25 mL/hr at 04/25/21 1108 3.375 g at 04/25/21 1108  enoxaparin (LOVENOX) injection 90 mg  1 mg/kg SubCUTAneous Q12H Amina CARRASCO MD   90 mg at 04/25/21 1108  benzocaine-menthoL (CEPACOL) lozenge 1 Lozenge  1 Lozenge Mucous Membrane PRN Timothy Mera MD   1 Lozenge at 04/09/21 1640  nystatin (MYCOSTATIN) 100,000 unit/mL oral suspension 500,000 Units  500,000 Units Oral QID Timothy Mera MD   500,000 Units at 04/25/21 1231  
 dextrose (D50W) injection syrg 12.5-25 g  25-50 mL IntraVENous PRN Timothy Mera MD      
 insulin lispro (HUMALOG) injection   SubCUTAneous AC&HS Timothy Mera MD   Stopped at 04/22/21 1630  
 atorvastatin (LIPITOR) tablet 10 mg  10 mg Oral DAILY Ozzy Mcdermott MD   10 mg at 04/25/21 4923  levothyroxine (SYNTHROID) tablet 88 mcg  88 mcg Oral ACB Ozzy Mcdermott MD   88 mcg at 04/25/21 4118  [Held by provider] lisinopril-hydroCHLOROthiazide (PRINZIDE, ZESTORETIC) 10-12.5 mg per tablet 1 Tab  1 Tab Oral DAILY Ozzy Mcdermott MD   Stopped at 04/15/21 0900  [Held by provider] meloxicam (MOBIC) tablet 7.5 mg  7.5 mg Oral DAILY Ozyz Mcdermott MD   Stopped at 04/19/21 0900  pantoprazole (PROTONIX) tablet 20 mg  20 mg Oral DAILY Ozzy Mcdermott MD   20 mg at 04/25/21 6082  sertraline (ZOLOFT) tablet 100 mg  100 mg Oral DAILY Ozzy Mcdermott MD   100 mg at 04/25/21 1446  cyclobenzaprine (FLEXERIL) tablet 10 mg  10 mg Oral TID Ozzy Mcdermott MD   10 mg at 04/25/21 0850  
 glucose chewable tablet 16 g  4 Tab Oral PRN Ozzy Mcdermott MD      
 dextrose (D50W) injection syrg 12.5-25 g  25-50 mL IntraVENous PRN Ozzy Mcdermott MD      
 glucagon [facility]) injection 1 mg  1 mg IntraMUSCular PRN Ozzy Mcdermott MD      
 acetaminophen (TYLENOL) tablet 650 mg  650 mg Oral Q6H PRN Ozzy Mcdermott MD   650 mg at 04/25/21 1231  
 melatonin tablet 5 mg  5 mg Oral QHS PRN Ozzy Mcdermott MD   5 mg at 04/24/21 221  ondansetron (ZOFRAN) injection 4 mg  4 mg IntraVENous Q4H PRN Alban Page MD   4 mg at 21 9107 No Known Allergies Objective:  
 
Blood pressure 120/77, pulse (!) 101, temperature 98.8 °F (37.1 °C), resp. rate 20, height 5' 4.49\" (1.638 m), weight 92.7 kg (204 lb 5.9 oz), SpO2 99 %, not currently breastfeeding. Temp (24hrs), Av.5 °F (36.9 °C), Min:98.2 °F (36.8 °C), Max:98.8 °F (37.1 °C) Intake and Output: 
Current Shift: No intake/output data recorded. Last 3 Shifts:  190 -  0700 In: -  
Out: 1500 [Urine:1500] Physical Exam:  
 
General: Lying in bed comfortably, no acute distress, on high flow oxygen. Eye: Reactive, symmetric Throat and Neck: Supple, full facemask in place. Lung: Reduced air entry bilaterally with scattered bilateral crackles, improved. No wheezing. No significant change. Heart: S1+S2. No murmurs. Abdomen: soft, non-tender. Bowel sounds normal. No masses; obese Extremities: No significant edema : Not done, Hyde catheter in place. She was given Lasix and is making decent urine output. Skin: No cyanosis Neurologic:  Currently awake and alert. Appears to be intact. Lab/Data Review: 
 
Recent Results (from the past 24 hour(s)) GLUCOSE, POC Collection Time: 21  4:39 PM  
Result Value Ref Range Glucose (POC) 142 (H) 65 - 100 mg/dL Performed by Tania Clemons, POC Collection Time: 21  9:03 PM  
Result Value Ref Range Glucose (POC) 130 (H) 65 - 100 mg/dL Performed by Sandor Márquez   
CBC W/O DIFF Collection Time: 21  4:28 AM  
Result Value Ref Range WBC 12.0 (H) 3.6 - 11.0 K/uL  
 RBC 4.58 3.80 - 5.20 M/uL  
 HGB 13.5 11.5 - 16.0 g/dL HCT 40.7 35.0 - 47.0 % MCV 88.9 80.0 - 99.0 FL  
 MCH 29.5 26.0 - 34.0 PG  
 MCHC 33.2 30.0 - 36.5 g/dL  
 RDW 18.4 (H) 11.5 - 14.5 % PLATELET 478 674 - 561 K/uL MPV 10.8 8.9 - 12.9 FL  
 NRBC 0.0 0  WBC ABSOLUTE NRBC 0.00 0.00 - 0.01 K/uL MAGNESIUM  
 Collection Time: 04/25/21  4:28 AM  
Result Value Ref Range Magnesium 1.4 (L) 1.6 - 2.4 mg/dL GLUCOSE, POC Collection Time: 04/25/21  8:44 AM  
Result Value Ref Range Glucose (POC) 137 (H) 65 - 100 mg/dL Performed by Roya Felix, POC Collection Time: 04/25/21 11:07 AM  
Result Value Ref Range Glucose (POC) 143 (H) 65 - 100 mg/dL Performed by Jonathan Carrasco   
 
 
XR ABD (KUB) Final Result Mild gastric distention. XR CHEST PORT Final Result No interval change. XR CHEST PORT Final Result Bilateral airspace disease and small left pleural effusion. XR CHEST PORT Final Result No significant interval change. XR CHEST PORT Final Result 1. There is persistent mixed interstitial and airspace disease diffusely  
throughout the lungs. There has been an interval decrease in the airspace  
pattern. These findings are consistent with an atypical pneumonia. 2.  The remainder of this examination is unchanged. XR CHEST PORT Final Result XR CHEST PORT Final Result XR CHEST PORT Final Result No significant interval change. XR CHEST PORT Final Result Increased moderate diffuse bilateral airspace opacities, likely a combination of  
edema and airspace disease. Developing ARDS not excluded. XR CHEST PORT Final Result XR CHEST PORT Final Result XR CHEST PORT Final Result XR CHEST PORT Final Result There is persistent airspace disease within the mid to lower lung  
zone predominance. This represents a heterogeneous pattern as might be  
associated with an atypical pneumonia. There has been little interval change. XR CHEST PORT Final Result XR CHEST PORT Final Result XR CHEST PORT Final Result No significant interval change. XR CHEST PORT Final Result XR CHEST PORT    (Results Pending) XR CHEST PORT    (Results Pending) XR CHEST PORT    (Results Pending) CT Results  (Last 48 hours) None Assessment: 1. Acute respiratory failure with hypoxia 2. COVID-19 pneumonia 3. Shortness of breath 4.  Cough 5. Generalized weakness 6. Acute kidney injury 7. Obstructive sleep apnea on CPAP 
8. Obesity Plan:  
 
Likely okay to transfer out of the ICU tomorrow. Saturating well on high flow nasal cannula 55 L, 50%,  we are moving closer towards getting her to a nasal cannula. Okay to transfer to the ICU whenever felt appropriate by the primary team. 
 
On the morning of 4/19 she was on a 60% high flow oxygen but became agitated. Took it off and was desaturating. Given Ativan 1 mg and placed on BiPAP. We will continue with diuresis, antibiotics and steroids. ID is also on the case. I believe that it is going to be a slow improvement given post Covid fibrosis. Stop Decadron today. Continue with diuresis. She is now negative for COVID-19. ID is also on the case. Continue with high flow oxygen, wean down as tolerated to keep saturation more than 92%. Use BiPAP as needed and then nocturnally. Patient has acute respiratory failure with hypoxia due to COVID-19 pneumonia Status post 5 days of remdesivir Currently on Decadron 4 mg p.o. every 24 hours Off azithromycin, on IV Zosyn per ID Status post tocilizumab on 4/5/2021 Vitamin C and zinc 
On therapeutic dose of Lovenox Echo normal LVEF Renal function stable today. Monitor renal function with fluid changes Check electrolytes and replace as needed DVT and GI prophylaxis Questions of patient were answered at bedside in detail Case discussed in detail with RN, RT, and care team 
Thank you for involving me in the care of this patient I will follow with you closely during hospitalization Overall prognosis appears to be guarded Greater than 30 minutes were spent in direct care with this patient.  
 
Gasper Tucker,  
[facility] 4/25/2021

## 2021-04-25 NOTE — PROGRESS NOTES
Hospitalist Progress Note Daily Progress Note: 4/25/2021 79-year-old female with acute hypoxic respiratory failure secondary to Covid pneumonia. Subjective: The patient is seen for follow  up. Patient seen and evaluated at bedside, of note patient remains on high flow nasal cannula, patient currently complains of generalized weakness as well as a cough, slight improvement since yesterday, discussed with RN. Problem List: 
Problem List as of 4/25/2021 Date Reviewed: 11/21/2017 Codes Class Noted - Resolved Respiratory failure ([facility] [address] ICD-10-CM: J96.90 ICD-9-CM: 518.81  4/4/2021 - Present Ductal carcinoma in situ (DCIS) of left breast ICD-10-CM: D05.12 
ICD-9-CM: 233.0  11/21/2017 - Present Overview Addendum 11/21/2017  1:39 PM by Thuy Singer MD  
  11/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 MN 6% Medications reviewed Current Facility-Administered Medications Medication Dose Route Frequency  magnesium sulfate 3 g in 0.9% sodium chloride 100 mL IVPB  3 g IntraVENous ONCE  
 dexAMETHasone (DECADRON) tablet 4 mg  4 mg Oral DAILY  guaiFENesin ER (MUCINEX) tablet 600 mg  600 mg Oral Q12H  
 albuterol-ipratropium (DUO-NEB) 2.5 MG-0.5 MG/3 ML  3 mL Nebulization Q6H PRN  
 alum-mag hydroxide-simeth (MYLANTA) oral suspension 30 mL  30 mL Oral Q4H PRN  
 famotidine (PEPCID) tablet 20 mg  20 mg Oral DAILY  furosemide (LASIX) injection 40 mg  40 mg IntraVENous BID  desitin/nystatin (1:1) topical compound   Topical BID  hydrOXYzine pamoate (VISTARIL) capsule 25 mg  25 mg Oral QID PRN  piperacillin-tazobactam (ZOSYN) 3.375 g in 0.9% sodium chloride (MBP/ADV) 100 mL MBP  3.375 g IntraVENous Q8H  
 enoxaparin (LOVENOX) injection 90 mg  1 mg/kg SubCUTAneous Q12H  
 benzocaine-menthoL (CEPACOL) lozenge 1 Lozenge  1 Lozenge Mucous Membrane PRN  
 nystatin (MYCOSTATIN) 100,000 unit/mL oral suspension 500,000 Units  500,000 Units Oral QID  dextrose (D50W) injection syrg 12.5-25 g  25-50 mL IntraVENous PRN  
 insulin lispro (HUMALOG) injection   SubCUTAneous AC&HS  atorvastatin (LIPITOR) tablet 10 mg  10 mg Oral DAILY  levothyroxine (SYNTHROID) tablet 88 mcg  88 mcg Oral ACB  [Held by provider] lisinopril-hydroCHLOROthiazide (PRINZIDE, ZESTORETIC) 10-12.5 mg per tablet 1 Tab  1 Tab Oral DAILY  [Held by provider] meloxicam (MOBIC) tablet 7.5 mg  7.5 mg Oral DAILY  pantoprazole (PROTONIX) tablet 20 mg  20 mg Oral DAILY  sertraline (ZOLOFT) tablet 100 mg  100 mg Oral DAILY  cyclobenzaprine (FLEXERIL) tablet 10 mg  10 mg Oral TID  glucose chewable tablet 16 g  4 Tab Oral PRN  
 dextrose (D50W) injection syrg 12.5-25 g  25-50 mL IntraVENous PRN  
 glucagon (GLUCAGEN) injection 1 mg  1 mg IntraMUSCular PRN  
 acetaminophen (TYLENOL) tablet 650 mg  650 mg Oral Q6H PRN  
 melatonin tablet 5 mg  5 mg Oral QHS PRN  
 ondansetron (ZOFRAN) injection 4 mg  4 mg IntraVENous Q4H PRN Review of Systems:  
Review of Systems Constitutional: Positive for malaise/fatigue. Negative for chills and fever. HENT: Negative. Eyes: Negative. Respiratory: Positive for cough, sputum production and shortness of breath. Negative for hemoptysis and wheezing. Cardiovascular: Negative. Gastrointestinal: Negative. Genitourinary: Negative. Musculoskeletal: Negative. Skin: Negative. Neurological: Positive for weakness. Negative for dizziness, focal weakness and headaches. Endo/Heme/Allergies: Negative. Psychiatric/Behavioral: Negative. Objective:  
Physical Exam:  
 
Visit Vitals /81 (BP 1 Location: Left upper arm, BP Patient Position: At rest) Pulse (!) 104 Temp 98.6 °F (37 °C) Resp 24 Ht 5' 4.49\" (1.638 m) Wt 92.7 kg (204 lb 5.9 oz) SpO2 95% Breastfeeding No  
BMI 34.55 kg/m² O2 Flow Rate (L/min): 55 l/min O2 Device: Heated, Hi flow nasal cannula Temp (24hrs), Av.5 °F (36.9 °C), Min:98.2 °F (36.8 °C), Max:98.7 °F (37.1 °C) No intake/output data recorded. 04/23 1901 - 04/25 0700 In: -  
Out: 1500 [Urine:1500] General:         Appears ga bit lethargic, alert, not appearing   distressed EENT:              EOMI. Anicteric sclerae. MMM Resp:               Decreased air entry bilaterally + bilateral bibasilar crackles CV:                  Regular  rhythm, S1 plus S2, no murmurs rubs or   gallops  No edema GI:                   Soft, Non distended, Non tender.  +Bowel sounds Neurologic:       Alert and oriented X 2, normal speech, Psych:    fair insight. Not anxious nor agitated Skin:                No rashes. No jaundice. IV infiltrated rt forearm. Data Review:  
   
Recent Days: 
Recent Labs  
  04/25/21 0428 04/24/21 0323 04/23/21 
0440 WBC 12.0* 9.6 8.1 HGB 13.5 12.8 12.2 HCT 40.7 38.6 36.6  210 186 Recent Labs  
  04/25/21 0428 04/24/21 0323 04/23/21 
0440 NA  --  138 137  136 K  --  3.2* 3.4*  3.4*  
CL  --  98 99  98  
CO2  --  32 33*  34* GLU  --  142* 146*  147* BUN  --  21* 18  19 CREA  --  1.17* 1.16*  1.17* CA  --  9.0 8.6  8.7 MG 1.4*  --  1.4* ALB  --  3.0* 2.8* TBILI  --  0.4 0.4 ALT  --  23 25 Recent Labs  
  04/23/21 
0330 PH 7.43  
PCO2 52* PO2 66* HCO3 33* FIO2 75.0  
 
 
24 Hour Results: 
Recent Results (from the past 24 hour(s)) GLUCOSE, POC Collection Time: 04/24/21 10:00 AM  
Result Value Ref Range Glucose (POC) 134 (H) 65 - 100 mg/dL Performed by Can Doyle, POC Collection Time: 04/24/21 12:31 PM  
Result Value Ref Range Glucose (POC) 159 (H) 65 - 100 mg/dL Performed by Can Doyle, POC Collection Time: 04/24/21  4:39 PM  
Result Value Ref Range Glucose (POC) 142 (H) 65 - 100 mg/dL Performed by BENTLEY Arriaza Collection Time: 04/24/21  9:03 PM  
Result Value Ref Range Glucose (POC) 130 (H) 65 - 100 mg/dL Performed by Andrew Haq   
CBC W/O DIFF Collection Time: 04/25/21  4:28 AM  
Result Value Ref Range WBC 12.0 (H) 3.6 - 11.0 K/uL  
 RBC 4.58 3.80 - 5.20 M/uL  
 HGB 13.5 11.5 - 16.0 g/dL HCT 40.7 35.0 - 47.0 % MCV 88.9 80.0 - 99.0 FL  
 MCH 29.5 26.0 - 34.0 PG  
 MCHC 33.2 30.0 - 36.5 g/dL  
 RDW 18.4 (H) 11.5 - 14.5 % PLATELET 957 977 - 208 K/uL MPV 10.8 8.9 - 12.9 FL  
 NRBC 0.0 0  WBC ABSOLUTE NRBC 0.00 0.00 - 0.01 K/uL MAGNESIUM Collection Time: 04/25/21  4:28 AM  
Result Value Ref Range Magnesium 1.4 (L) 1.6 - 2.4 mg/dL Assessment/  
 
Patient Active Problem List  
Diagnosis Code  Ductal carcinoma in situ (DCIS) of left breast D05.12  Respiratory failure ([facility] [address] J96.90 Acute respiratory failure with hypoxia secondary to COVID-19 pna:  
-patient presented with acute respiratory failure with hypoxia requiring high flow nasal cannula for ventilatory support and based on patient's clinical presentation secondary to COVID-19 pneumonia, patient does not meet sepsis criteria at this time Completed Redemsivir Continue Decadron 6 mg IV daily Completed  azithromycin Continue to monitor respiratory status, moved to icu 4/18 am 2/2 fatigue, hypoxia on cpap, in icu appeared more settled down but transiently on bipap earlier 2/2 pulling mask off/agitation-settled down now and on hfnc sao2~94. Aggressive pulmonary rehabilitation Pulling off mask has been intermittent problem, needs close monitoring. Mitts were attempted though anxiety led to much increased anxiety/agitation. Pulmonology consult appreciated, Infectious disease consult appreciated Hypertensioncurrently resolved 
  
Hypothyroidism 
continue Synthroid 
  
Gastroesophageal reflux disease 
continue Protonix once daily 
  
Hyperglycemia due to type 2 diabetes  
likely iatrogenic/steroids, currently controlled 
continue lispro tid according to 24 hour coverage Continue insulin sliding scale Hypomagnesemiareplete Magnesium and recheck Magnesium Hypokalemia - obtain repeat potassium to assess for resolution 
  
Depression/anxietycontinue home Zoloft 
  
Hx breast ca/meningioma. Xrt with breat ca 1.5 years ago. 
  
ProphylaxisLovenox FENcardiac diet, replete potassium and magnesium Full code, POA is daughter Ghada Loza [phone]. Dispositionpending course, slow wean. Possible disposition to LTAC Critical care time spent 35 minutes involving direct patient care as well as reviewing patient's labs and coordination of care with nursing staff 
  
25.0 - 29.9 Overweight / Body mass index is 35.28 kg/m². Care Plan discussed with: Patient/Family, Nurse,  and Consultant . Gail Connor MD

## 2021-04-25 NOTE — PROGRESS NOTES
Patient complains of abdominal pain with and without palpation. Bowel sounds present. Loose green stool in flexiseal.  KUB ordered per Dr. Marnell Krabbe.

## 2021-04-26 NOTE — PROGRESS NOTES
Pulmonary and Critical Care Progress Note Subjective:  
 
Patient seen and examined in her room in the ICU this afternoon, no acute events overnight. Saturating very well on high flow nasal cannula 60 L, 50%. Last night she was on 55 L flow but had to be increased. Seems to be better from a mentation standpoint as well however she appears to be sundowning at night. I discussed the case in detail with the bedside nursing staff and the respiratory therapist. 
 
Castilloene Flint to transfer to the floor whenever felt appropriate by the primary team. 
 
ABG this morning showed hypoxia Chest x-ray today continues to show bilateral patchy infiltrates, however there appears to be a slight improvement. Review of Systems: 
Limited review of system because patient is a poor historian. Current Facility-Administered Medications Medication Dose Route Frequency Provider Last Rate Last Admin  guaiFENesin ER (MUCINEX) tablet 600 mg  600 mg Oral Q12H León Cueva MD   600 mg at 04/26/21 0817  
 albuterol-ipratropium (DUO-NEB) 2.5 MG-0.5 MG/3 ML  3 mL Nebulization Q6H PRN Dk Dick MD      
 alum-mag hydroxide-simeth (MYLANTA) oral suspension 30 mL  30 mL Oral Q4H PRN Winnie Marinelli MD      
 famotidine (PEPCID) tablet 20 mg  20 mg Oral DAILY Winnie Marinelli MD   20 mg at 04/26/21 8371  furosemide (LASIX) injection 40 mg  40 mg IntraVENous BID Dk Dick MD   40 mg at 04/26/21 5102  desitin/nystatin (1:1) topical compound   Topical BID Nereida Aranda MD   Given at 04/26/21 5463  hydrOXYzine pamoate (VISTARIL) capsule 25 mg  25 mg Oral QID PRN Ramila CARRASCO MD   25 mg at 04/25/21 2051  enoxaparin (LOVENOX) injection 90 mg  1 mg/kg SubCUTAneous Q12H Ramila CARRASCO MD   90 mg at 04/25/21 2238  benzocaine-menthoL (CEPACOL) lozenge 1 Lozenge  1 Lozenge Mucous Membrane PRN Nereida Aranda MD   1 Lozenge at 04/09/21 1640  nystatin (MYCOSTATIN) 100,000 unit/mL oral suspension 500,000 Units  500,000 Units Oral QID Jose Patel MD   500,000 Units at 04/26/21 0816  
 dextrose (D50W) injection syrg 12.5-25 g  25-50 mL IntraVENous PRN Jose Patel MD      
 insulin lispro (HUMALOG) injection   SubCUTAneous AC&HS Jose Patel MD   3 Units at 04/26/21 1545  
 atorvastatin (LIPITOR) tablet 10 mg  10 mg Oral DAILY Rd Francis MD   10 mg at 04/26/21 8908  levothyroxine (SYNTHROID) tablet 88 mcg  88 mcg Oral ACB Rd Francis MD   88 mcg at 04/26/21 7765  [Held by provider] lisinopril-hydroCHLOROthiazide (PRINZIDE, ZESTORETIC) 10-12.5 mg per tablet 1 Tab  1 Tab Oral DAILY Rd Francis MD   Stopped at 04/15/21 0900  [Held by provider] meloxicam (MOBIC) tablet 7.5 mg  7.5 mg Oral DAILY Rd Francis MD   Stopped at 04/19/21 0900  pantoprazole (PROTONIX) tablet 20 mg  20 mg Oral DAILY Rd Francis MD   20 mg at 04/26/21 6785  sertraline (ZOLOFT) tablet 100 mg  100 mg Oral DAILY Rd Francis MD   100 mg at 04/26/21 7887  cyclobenzaprine (FLEXERIL) tablet 10 mg  10 mg Oral TID Rd Francis MD   10 mg at 04/26/21 0900  
 glucose chewable tablet 16 g  4 Tab Oral PRN Rd Francis MD      
 dextrose (D50W) injection syrg 12.5-25 g  25-50 mL IntraVENous PRN Rd Francis MD      
 glucagon [facility]) injection 1 mg  1 mg IntraMUSCular PRN Rd Francis MD      
 acetaminophen (TYLENOL) tablet 650 mg  650 mg Oral Q6H PRN Rd Francis MD   650 mg at 04/25/21 1845  
 melatonin tablet 5 mg  5 mg Oral QHS PRN Rd Francis MD   5 mg at 04/25/21 2051  ondansetron (ZOFRAN) injection 4 mg  4 mg IntraVENous Q4H PRN Rd Francis MD   4 mg at 04/20/21 8505 No Known Allergies Objective:  
 
Blood pressure 112/87, pulse (!) 112, temperature 97.6 °F (36.4 °C), resp.  rate 22, height 5' 4.49\" (1.638 m), weight 94.3 kg (207 lb 14.3 oz), SpO2 96 %, not currently breastfeeding. Temp (24hrs), Av.1 °F (36.7 °C), Min:97.2 °F (36.2 °C), Max:98.8 °F (37.1 °C) Intake and Output: 
Current Shift: No intake/output data recorded. Last 3 Shifts:  1901 -  0700 In: 10 Out: 600 [Urine:600] Physical Exam:   
 
General: Lying in bed comfortably, no acute distress, on high flow oxygen. Eye: Reactive, symmetric Throat and Neck: Supple, full facemask in place. Lung: Reduced air entry bilaterally with scattered bilateral crackles, improved. No wheezing. No significant change. Heart: S1+S2. No murmurs. Abdomen: soft, non-tender. Bowel sounds normal. No masses; obese Extremities: No significant edema : Not done, Hyde catheter in place. She was given Lasix and is making decent urine output. Skin: No cyanosis Neurologic:  Currently awake and alert. Appears to be intact. Lab/Data Review: 
 
Recent Results (from the past 24 hour(s)) GLUCOSE, POC Collection Time: 21  3:31 PM  
Result Value Ref Range Glucose (POC) 171 (H) 65 - 100 mg/dL Performed by Lila Stovall, POC Collection Time: 21  8:49 PM  
Result Value Ref Range Glucose (POC) 182 (H) 65 - 100 mg/dL Performed by Maisha Kitchen BLOOD GAS, ARTERIAL Collection Time: 21  4:35 AM  
Result Value Ref Range pH 7.49 (H) 7.35 - 7.45    
 PCO2 41 35 - 45 mmHg PO2 47 (LL) 75 - 100 mmHg O2 SAT 84 (L) >95 % BICARBONATE 31 (H) 22 - 26 mmol/L  
 BASE EXCESS 7.2 (H) 0 - 2 mmol/L  
 O2 METHOD High Flow O2    
 FIO2 65.0 % EPAP/CPAP/PEEP 0    
 SITE Right Brachial    
 JACQUELIN'S TEST PASS METABOLIC PANEL, COMPREHENSIVE Collection Time: 21  7:50 AM  
Result Value Ref Range Sodium 139 136 - 145 mmol/L Potassium 2.9 (L) 3.5 - 5.1 mmol/L Chloride 99 97 - 108 mmol/L  
 CO2 30 21 - 32 mmol/L Anion gap 10 5 - 15 mmol/L Glucose 228 (H) 65 - 100 mg/dL BUN 27 (H) 6 - 20 mg/dL Creatinine 1.42 (H) 0.55 - 1.02 mg/dL BUN/Creatinine ratio 19 12 - 20 GFR est AA 44 (L) >60 ml/min/1.73m2 GFR est non-AA 36 (L) >60 ml/min/1.73m2 Calcium 9.9 8.5 - 10.1 mg/dL Bilirubin, total 0.4 0.2 - 1.0 mg/dL AST (SGOT) 27 15 - 37 U/L  
 ALT (SGPT) 37 12 - 78 U/L Alk. phosphatase 82 45 - 117 U/L Protein, total 6.7 6.4 - 8.2 g/dL Albumin 2.8 (L) 3.5 - 5.0 g/dL Globulin 3.9 2.0 - 4.0 g/dL A-G Ratio 0.7 (L) 1.1 - 2.2    
CBC W/O DIFF Collection Time: 04/26/21  7:50 AM  
Result Value Ref Range WBC 12.6 (H) 3.6 - 11.0 K/uL  
 RBC 4.36 3.80 - 5.20 M/uL  
 HGB 12.8 11.5 - 16.0 g/dL HCT 38.9 35.0 - 47.0 % MCV 89.2 80.0 - 99.0 FL  
 MCH 29.4 26.0 - 34.0 PG  
 MCHC 32.9 30.0 - 36.5 g/dL  
 RDW 18.1 (H) 11.5 - 14.5 % PLATELET 034 226 - 631 K/uL MPV 10.8 8.9 - 12.9 FL  
MAGNESIUM Collection Time: 04/26/21  7:50 AM  
Result Value Ref Range Magnesium 2.1 1.6 - 2.4 mg/dL GLUCOSE, POC Collection Time: 04/26/21  8:23 AM  
Result Value Ref Range Glucose (POC) 243 (H) 65 - 100 mg/dL Performed by RACHNA BURGESS   
 
 
XR CHEST PORT Final Result No interval change. XR ABD (KUB) Final Result Mild gastric distention. XR CHEST PORT Final Result No interval change. XR CHEST PORT Final Result Bilateral airspace disease and small left pleural effusion. XR CHEST PORT Final Result No significant interval change. XR CHEST PORT Final Result 1. There is persistent mixed interstitial and airspace disease diffusely  
throughout the lungs. There has been an interval decrease in the airspace  
pattern. These findings are consistent with an atypical pneumonia. 2.  The remainder of this examination is unchanged. XR CHEST PORT Final Result XR CHEST PORT Final Result XR CHEST PORT Final Result No significant interval change. XR CHEST PORT Final Result Increased moderate diffuse bilateral airspace opacities, likely a combination of  
edema and airspace disease. Developing ARDS not excluded. XR CHEST PORT Final Result XR CHEST PORT Final Result XR CHEST PORT Final Result XR CHEST PORT Final Result There is persistent airspace disease within the mid to lower lung  
zone predominance. This represents a heterogeneous pattern as might be  
associated with an atypical pneumonia. There has been little interval change. XR CHEST PORT Final Result XR CHEST PORT Final Result XR CHEST PORT Final Result No significant interval change. XR CHEST PORT Final Result XR CHEST PORT    (Results Pending) XR CHEST PORT    (Results Pending) XR CHEST PORT    (Results Pending) CT Results  (Last 48 hours) None Assessment: 1. Acute respiratory failure with hypoxia 2. COVID-19 pneumonia 3. Shortness of breath 4.  Cough 5. Generalized weakness 6. Acute kidney injury 7. Obstructive sleep apnea on CPAP 
8. Obesity Plan:  
 
Patient was saturating well at 55 L and 50% FiO2 on high flow nasal cannula oxygen, last night it was increased to 60% as she dropped her saturations. Blood gas from today showed 7.4 9/41/47/30 1/84% She is now saturating 96%. We will cut down flow to 50 L, keep oxygen percentage at 50% Repeat blood gas in the morning Comfortable otherwise likely okay to transfer out of the ICU tomorrow. We will continue with diuresis, antibiotics and steroids. ID is also on the case. I believe that it is going to be a slow improvement given post Covid fibrosis. Stop Decadron today. Continue with diuresis. She is now negative for COVID-19. ID is also on the case. Continue with high flow oxygen, wean down as tolerated to keep saturation more than 92%. Use BiPAP as needed and then nocturnally. Patient has acute respiratory failure with hypoxia due to COVID-19 pneumonia Status post 5 days of Tereza Weies Currently on Decadron 4 mg p.o. every 24 hours Off azithromycin, on IV Zosyn per ID Status post tocilizumab on 4/5/2021 Vitamin C and zinc 
On therapeutic dose of Lovenox Echo normal LVEF Renal function stable today. Monitor renal function with fluid changes Check electrolytes and replace as needed DVT and GI prophylaxis Questions of patient were answered at bedside in detail Case discussed in detail with RN, RT, and care team 
Thank you for involving me in the care of this patient I will follow with you closely during hospitalization Overall prognosis appears to be guarded Greater than 45 minutes were spent in direct care with this patient. Maykel Lew MD 
[facility] 4/26/2021

## 2021-04-26 NOTE — PROGRESS NOTES
Hospitalist Progress Note Daily Progress Note: 4/26/2021 42-year-old female with acute hypoxic respiratory failure secondary to Covid pneumonia. Subjective: The patient is seen for follow  up. Patient seen and evaluated at bedside, of note patient remains on high flow nasal cannula, patient currently complains of generalized weakness as well as a cough, slight improvement since yesterday, discussed with RN. Problem List: 
Problem List as of 4/26/2021 Date Reviewed: 11/21/2017 Codes Class Noted - Resolved Respiratory failure ([facility] [address] ICD-10-CM: J96.90 ICD-9-CM: 518.81  4/4/2021 - Present Ductal carcinoma in situ (DCIS) of left breast ICD-10-CM: D05.12 
ICD-9-CM: 233.0  11/21/2017 - Present Overview Addendum 11/21/2017  1:39 PM by Jodi Riley MD  
  11/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 LA 6% Medications reviewed Current Facility-Administered Medications Medication Dose Route Frequency  guaiFENesin ER (MUCINEX) tablet 600 mg  600 mg Oral Q12H  
 albuterol-ipratropium (DUO-NEB) 2.5 MG-0.5 MG/3 ML  3 mL Nebulization Q6H PRN  
 alum-mag hydroxide-simeth (MYLANTA) oral suspension 30 mL  30 mL Oral Q4H PRN  
 famotidine (PEPCID) tablet 20 mg  20 mg Oral DAILY  furosemide (LASIX) injection 40 mg  40 mg IntraVENous BID  desitin/nystatin (1:1) topical compound   Topical BID  hydrOXYzine pamoate (VISTARIL) capsule 25 mg  25 mg Oral QID PRN  piperacillin-tazobactam (ZOSYN) 3.375 g in 0.9% sodium chloride (MBP/ADV) 100 mL MBP  3.375 g IntraVENous Q8H  
 enoxaparin (LOVENOX) injection 90 mg  1 mg/kg SubCUTAneous Q12H  
 benzocaine-menthoL (CEPACOL) lozenge 1 Lozenge  1 Lozenge Mucous Membrane PRN  
 nystatin (MYCOSTATIN) 100,000 unit/mL oral suspension 500,000 Units  500,000 Units Oral QID  dextrose (D50W) injection syrg 12.5-25 g  25-50 mL IntraVENous PRN  
 insulin lispro (HUMALOG) injection   SubCUTAneous AC&HS  atorvastatin (LIPITOR) tablet 10 mg  10 mg Oral DAILY  levothyroxine (SYNTHROID) tablet 88 mcg  88 mcg Oral ACB  [Held by provider] lisinopril-hydroCHLOROthiazide (PRINZIDE, ZESTORETIC) 10-12.5 mg per tablet 1 Tab  1 Tab Oral DAILY  [Held by provider] meloxicam (MOBIC) tablet 7.5 mg  7.5 mg Oral DAILY  pantoprazole (PROTONIX) tablet 20 mg  20 mg Oral DAILY  sertraline (ZOLOFT) tablet 100 mg  100 mg Oral DAILY  cyclobenzaprine (FLEXERIL) tablet 10 mg  10 mg Oral TID  glucose chewable tablet 16 g  4 Tab Oral PRN  
 dextrose (D50W) injection syrg 12.5-25 g  25-50 mL IntraVENous PRN  
 glucagon (GLUCAGEN) injection 1 mg  1 mg IntraMUSCular PRN  
 acetaminophen (TYLENOL) tablet 650 mg  650 mg Oral Q6H PRN  
 melatonin tablet 5 mg  5 mg Oral QHS PRN  
 ondansetron (ZOFRAN) injection 4 mg  4 mg IntraVENous Q4H PRN Review of Systems:  
Review of Systems Constitutional: Positive for malaise/fatigue. Negative for chills and fever. HENT: Negative. Eyes: Negative. Respiratory: Positive for cough, sputum production and shortness of breath. Negative for hemoptysis and wheezing. Cardiovascular: Negative. Gastrointestinal: Negative. Genitourinary: Negative. Musculoskeletal: Negative. Skin: Negative. Neurological: Positive for weakness. Negative for dizziness, focal weakness and headaches. Endo/Heme/Allergies: Negative. Psychiatric/Behavioral: Negative. Objective:  
Physical Exam:  
 
Visit Vitals /75 (BP 1 Location: Left upper arm, BP Patient Position: At rest) Pulse (!) 102 Temp 98.2 °F (36.8 °C) Resp 22 Ht 5' 4.49\" (1.638 m) Wt 94.3 kg (207 lb 14.3 oz) SpO2 98% Breastfeeding No  
BMI 35.15 kg/m² O2 Flow Rate (L/min): 60 l/min O2 Device: Hi flow nasal cannula Temp (24hrs), Av.2 °F (36.8 °C), Min:97.2 °F (36.2 °C), Max:98.8 °F (37.1 °C) No intake/output data recorded. 1901 -  0700 In: 10 Out: 600 [Urine:600] General: Appears ga bit lethargic, alert, not appearing   distressed EENT:              EOMI. Anicteric sclerae. MMM Resp:               Decreased air entry bilaterally + bilateral bibasilar crackles CV:                  Regular  rhythm, S1 plus S2, no murmurs rubs or   gallops  No edema GI:                   Soft, Non distended, Non tender.  +Bowel sounds Neurologic:       Alert and oriented X 2, normal speech, Psych:    fair insight. Not anxious nor agitated Skin:                No rashes. No jaundice. IV infiltrated rt forearm. Data Review:  
   
Recent Days: 
Recent Labs  
  04/25/21 0428 04/24/21 
0323 WBC 12.0* 9.6 HGB 13.5 12.8 HCT 40.7 38.6  210 Recent Labs  
  04/25/21 0428 04/24/21 
6888 NA  --  138 K  --  3.2*  
CL  --  98  
CO2  --  32  
GLU  --  142* BUN  --  21* CREA  --  1.17* CA  --  9.0 MG 1.4*  --   
ALB  --  3.0* TBILI  --  0.4 ALT  --  23 Recent Labs  
  04/26/21 
0435 PH 7.49* PCO2 41 PO2 47* HCO3 31* FIO2 65.0  
 
 
24 Hour Results: 
Recent Results (from the past 24 hour(s)) GLUCOSE, POC Collection Time: 04/25/21  8:44 AM  
Result Value Ref Range Glucose (POC) 137 (H) 65 - 100 mg/dL Performed by Frederick Collazo, POC Collection Time: 04/25/21 11:07 AM  
Result Value Ref Range Glucose (POC) 143 (H) 65 - 100 mg/dL Performed by Frederick Collazo, POC Collection Time: 04/25/21  3:31 PM  
Result Value Ref Range Glucose (POC) 171 (H) 65 - 100 mg/dL Performed by Frederick Collazo, POC Collection Time: 04/25/21  8:49 PM  
Result Value Ref Range Glucose (POC) 182 (H) 65 - 100 mg/dL Performed by Tobias Loyola BLOOD GAS, ARTERIAL Collection Time: 04/26/21  4:35 AM  
Result Value Ref Range pH 7.49 (H) 7.35 - 7.45    
 PCO2 41 35 - 45 mmHg PO2 47 (LL) 75 - 100 mmHg O2 SAT 84 (L) >95 %  BICARBONATE 31 (H) 22 - 26 mmol/L  
 BASE EXCESS 7.2 (H) 0 - 2 mmol/L  
 O2 METHOD High Flow O2 FIO2 65.0 % EPAP/CPAP/PEEP 0    
 SITE Right Brachial    
 JACQUELIN'S TEST PASS Assessment/  
 
Patient Active Problem List  
Diagnosis Code  Ductal carcinoma in situ (DCIS) of left breast D05.12  Respiratory failure ([facility] [address] J96.90 Acute respiratory failure with hypoxia secondary to COVID-19 pna:  
-patient presented with acute respiratory failure with hypoxia requiring high flow nasal cannula for ventilatory support and based on patient's clinical presentation secondary to COVID-19 pneumonia, patient does not meet sepsis criteria at this time Completed Redemsivir Continue Decadron 6 mg IV daily Completed  azithromycin Continue to monitor respiratory status, moved to icu 4/18 am 2/2 fatigue, hypoxia on cpap, in icu appeared more settled down but transiently on bipap earlier 2/2 pulling mask off/agitation-settled down now and on hfnc sao2~94. Aggressive pulmonary rehabilitation Pulling off mask has been intermittent problem, needs close monitoring. Mitts were attempted though anxiety led to much increased anxiety/agitation. Pulmonology consult appreciated, Infectious disease consult appreciated Hypertensioncurrently resolved 
  
Hypothyroidism 
continue Synthroid 
  
Gastroesophageal reflux disease 
continue Protonix once daily 
  
Hyperglycemia due to type 2 diabetes  
likely iatrogenic/steroids, currently controlled 
continue lispro tid according to 24 hour coverage Continue insulin sliding scale Hypomagnesemiaobtain repeat magnesium to assess for resolution Hypokalemia - obtain repeat potassium to assess for resolution 
  
Depression/anxietycontinue home Zoloft 
  
Hx breast ca/meningioma. Xrt with breat ca 1.5 years ago. 
  
ProphylaxisLovenox FENcardiac diet, replete potassium and magnesium Full code, POA is daughter Elizabeth Mckeon [phone]. Dispositionpending course, slow wean. Possible disposition to LTAC Critical care time spent 35 minutes involving direct patient care as well as reviewing patient's labs and coordination of care with nursing staff 
  
25.0 - 29.9 Overweight / Body mass index is 35.28 kg/m². Care Plan discussed with: Patient/Family, Nurse,  and Consultant . William Gómez MD

## 2021-04-26 NOTE — PROGRESS NOTES
Problem: Dysphagia (Adult) Goal: *Acute Goals and Plan of Care (Insert Text) Description: Speech Therapy Goals Initiated 4/20/2021 
-Patient will participate in modified barium swallow study within 7 day(s). [ ] Not met  [ ]  MET   [ x] Progressing  [ ] Discontinue 
-Patient will tolerate dysphagia 2 diet with nectar thick liquids without signs/symptoms of aspiration given minimal cues within 7 day(s). [ ] Not met  [ ]  MET   [ x] Progressing  [ ] Discontinue 
-Patient will demonstrate understanding of swallow safety precautions and aspiration precautions, diet recs with minimal cues within 7 day(s). [ ] Not met  [ ]  MET   [ x] Progressing  [ ] Saranya Romo Outcome: Progressing Towards Goal 
 SPEECH LANGUAGE PATHOLOGY DYSPHAGIA TREATMENT Patient: Aldair Chase (79 y.o. female) Date: 4/26/2021 Diagnosis: Respiratory failure ([facility] [address] [J96.90] <principal problem not specified> Precautions: aspiration ASSESSMENT: 
Pt seen for tx during noon meal. Pt currently on HFNC 50L min, 50%, 02 sats ranging from 87-93% during session. Pt is easily distracted and needs limited distractions during meals, assistance with PO intake due to increased WOB with self-feeding. Limited intake. With trials mech soft and nectar, oral phase WFL, pharyngeal phase with persistent mild delay. Reduced respiratory/deglutition coordination. No overt s/s aspiration; however, brief (? Reliability) desats to 87% noted x 3 during meal, improved with rest and redirection. Nsg reports pt received meds with water (thin water, whole meds) and tolerated without overt s/s aspiration. PLAN: 
Recommendations and Planned Interventions: At this time, pt's respiratory status continues to fluctuate but appears improved this date from recent SLP session. Will cont to follow, diet upgrade is not  yet indicated due to aspiration risk and s/s dysphagia as above. Will request MBS when pt is able to come off of HFNC 02. Discussed with nsg. Patient continues to benefit from skilled intervention to address the above impairments. Continue treatment per established plan of care. Frequency/Duration: Patient will be followed by speech-language pathology 4 times a week to address goals. Discharge Recommendations: To Be Determined SUBJECTIVE:  
Patient alert, agreeable, pleasant. OBJECTIVE:  
 
CXR Results  (Last 48 hours) 04/26/21 0316  XR CHEST PORT Final result Impression:  No interval change. Narrative:  EXAMINATION: XR CHEST PORT  
   
HISTORY: Respiratory failure COMPARISON: 4/25/2021 FINDINGS: Single view. There are bilateral interstitial and airspace opacities,  
similar when compared with prior studies. There is no pneumothorax. Heart size  
unchanged. 04/25/21 0240  XR CHEST PORT Final result Impression:  No interval change. Narrative:  Chest one view. AP portable semierect view at 0210 dated 4/25/2021. Comparison 4/24/2021. The cardiomediastinal silhouette is stable. Bilateral pneumonia is  
redemonstrated with a persistent left pleural effusion. Cognitive and Communication Status: 
Neurologic State: Alert Orientation Level: Oriented to person Cognition: Follows commands, Poor safety awareness Safety/Judgement: Decreased awareness of environment, Decreased awareness of need for assistance Pain: 
Pain Scale 1: Numeric (0 - 10) Pain Intensity 1: 0 After treatment:  
Patient left in no apparent distress in bed, Call bell within reach, and Nursing notified COMMUNICATION/EDUCATION:  
Patient was educated regarding her deficit(s) of dysphagia, swallow safety precautions, diet recs and POC. She demonstrated Fair understanding as evidenced by verbal responsiveness. The patient's plan of care including recommendations, planned interventions, and recommended diet changes were discussed with: Registered nurse. Marcelino Earl M.S. CCC-SLP Time Calculation: 13 mins

## 2021-04-26 NOTE — PROGRESS NOTES
Infectious Disease Progress Note Subjective:  
Stable, remains on high flow O2,  denies productive cough, no acute events since last see. No acute events since last seen. Objective:  
Physical Exam:  
 
Visit Vitals /87 Pulse (!) 112 Temp 97.6 °F (36.4 °C) Resp 22 Ht 5' 4.49\" (1.638 m) Wt 207 lb 14.3 oz (94.3 kg) SpO2 96% Breastfeeding No  
BMI 35.15 kg/m² O2 Flow Rate (L/min): 60 l/min O2 Device: Heated, Hi flow nasal cannula Temp (24hrs), Av.3 °F (36.8 °C), Min:97.6 °F (36.4 °C), Max:98.8 °F (37.1 °C) No intake/output data recorded.  1901 -  0700 In: 10 Out: 600 [Urine:600] General: NAD, lethargic, confused HEENT: MICHELLE, Moist mucosa, on high flow Lungs: CTA b/l, no wheeze/rhonchi Heart: S1S2+, RRR, no murmur Abdo: Soft, NT, ND, +BS Exts: Trace edema, + pulses b/l  
Skin: No wounds, No rashes or lesions Data Review:  
   
Recent Days: 
Recent Labs  
  21 
0750 21 
0428 21 
0323 WBC 12.6* 12.0* 9.6 HGB 12.8 13.5 12.8 HCT 38.9 40.7 38.6  256 210 Recent Labs  
  21 
0750 21 
0323 BUN 27* 21* CREA 1.42* 1.17* Lab Results Component Value Date/Time C-Reactive protein 3.52 (H) 2021 04:00 AM  
  
Microbiology: None Diagnostics CXR Results  (Last 48 hours) 21 0316  XR CHEST PORT Final result Impression:  No interval change. Narrative:  EXAMINATION: XR CHEST PORT  
   
HISTORY: Respiratory failure COMPARISON: 2021 FINDINGS: Single view. There are bilateral interstitial and airspace opacities,  
similar when compared with prior studies. There is no pneumothorax. Heart size  
unchanged. 21 0240  XR CHEST PORT Final result Impression:  No interval change. Narrative:  Chest one view. AP portable semierect view at 0210 dated 2021. Comparison 2021.   
   
The cardiomediastinal silhouette is stable. Bilateral pneumonia is  
redemonstrated with a persistent left pleural effusion. Assessment/Plan 1. COVID-19 pneumonitis, S/p appropriate Tx. Repeat test for COVID-19 neg on 04/17 neg Ongoing b/l infiltrates on CXR, remains on high flow O2 Moderate leukocytosis on routine labs, off steroid therapy S/p 10 days of empiric Zosyn, discontinued Routine labs in the morning 2. AMS: Improved mentation, following commands appropriately 3. Acute on chronic renal failure: Mild rise in Cr likely from diuresis 4. Generalized weakness due to prolonged illness, being followed by PT/OT 5. Anemia of chronic disease: Stable hgb Lizzie MD Rashard  
 
4/26/2021

## 2021-04-26 NOTE — PROGRESS NOTES
Writer spoke with patient's daughter Villa Azevedo regarding possible LTACH. At this time Elizabeth Mckeon does not want the patient moved anywhere and wants her to remain at [facility] in ICU for now. CM discussed the function of Kendra Chetan expressed understanding and states she would have to go and tour [facility] in person before sending the patient there, however her preference is still to have patient remain at [facility] as Elizabeth Mckeon does not feel the patient has stable enough to be transferred anywhere. DARSHANA has notified Dr. Heather Hadley.

## 2021-04-27 NOTE — PROGRESS NOTES
PHYSICAL THERAPY TREATMENT Patient: Lucina Gonzalez (85 y.o. female) Date: 4/27/2021 [facility] [J96.90] <principal problem not specified> Precautions:   
Chart, physical therapy assessment, plan of care and goals were reviewed. ASSESSMENT Patient continues with skilled PT services and unable to progress much this visit due to increased drowsiness. Per nursing medicine causing increased drowsiness. Pt was agreeable to session however required increased cueing to stay awake and was drowsy and falling asleep sitting up on the edge of the bed. Not appropriate for OOB transfers at this time due to poor safety/drowsiness. Patient sat up for 1-2 minutes prior to returning to bed with SpO2 dropping into mid 80s sitting up. Attempted LE therex in the bed and patient had difficulty staying awake and participating. Will cont to progress further next visit. Current Level of Function Impacting Discharge (mobility/balance): weakness, poor activity tolerance Other factors to consider for discharge: drowsiness PLAN : 
Patient continues to benefit from skilled intervention to address the above impairments. Continue treatment per established plan of care. to address goals. Recommendation for discharge: (in order for the patient to meet his/her long term goals) LTAC vs SNF This discharge recommendation: 
Has been made in collaboration with the attending provider and/or case management IF patient discharges home will need the following DME: to be determined (TBD) SUBJECTIVE:  
Patient stated I am tired OBJECTIVE DATA SUMMARY:  
Critical Behavior: 
Neurologic State: Drowsy Orientation Level: Disoriented to person, Disoriented to place Cognition: Impaired decision making Safety/Judgement: Decreased awareness of environment, Decreased awareness of need for assistance Functional Mobility Training: 
Bed Mobility: 
Rolling: Minimum assistance Supine to Sit: Minimum assistance;Assist x2 Sit to Supine: Minimum assistance;Assist x2 Scooting: Minimum assistance Transfers: 
Not appropriate due to drowsiness Balance: 
Sitting: Intact; With support Sitting - Static: Fair (occasional) Therapeutic Exercises:  
5x heel slides, AAROM Pain Ratin/10 Activity Tolerance:  
Poor, requires frequent rest breaks, and observed SOB with activity Please refer to the flowsheet for vital signs taken during this treatment. After treatment patient left in no apparent distress:  
Supine in bed, Call bell within reach, and 1:1 present COMMUNICATION/COLLABORATION:  
The patients plan of care was discussed with: Occupational therapist and Registered nurse. cotx with OT for increased safety and poor activity tolerance. Awais Safe Time Calculation: 14 mins Problem: Mobility Impaired (Adult and Pediatric) Goal: *Acute Goals and Plan of Care (Insert Text) Description: Pt will be I with LE HEP in 7 days. Pt will perform bed mobility with mod I in 7 days. Pt will perform transfers with mod I in 7 days. Pt will amb 10-25 feet with LRAD safely with mod I in 7 days.   
 
 
Outcome: Progressing Towards Goal

## 2021-04-27 NOTE — PROGRESS NOTES
OCCUPATIONAL THERAPY TREATMENT  Patient: Anita Epperson (41 y.o. female)  Date: 4/27/2021  Diagnosis: Respiratory failure ([facility] [address] [J96.90] <principal problem not specified>       Precautions: Falls  Chart, occupational therapy assessment, plan of care, and goals were reviewed. ASSESSMENT  Patient continues with skilled OT services and is progressing slowly towards goals. Patient semi supine in bed with 1:1 nurse aide present in room upon OT/PT arrival. Patient drowsy throughout session but able to arouse and agreeable to participate with OT/PT. Patient min A bed mobility, min A x2 sup <> sit, min A scooting. Patient sat EOB ~2 minutes due to fatigue and oxygen dropping to mid 80s with sitting EOB. Attempted UE HEP with patient once returned to semi supine in bed, however, patient falling asleep and requiring near constant cueing to maintain arousal so further participation held at this time. Nursing aware and stating patient given medication night prior to assist with sleeping. Patient unsafe to participate in OOB mobility this day due to drowsiness, total A LE dressing. Patient remains on hi-flow O2 at this time. Continue to progress toward patient goals. Recommend discharge LTAC when medically appropriate. Current Level of Function Impacting Discharge (ADLs): total A LE dressing    Other factors to consider for discharge: time since onset, severity of deficits, drowsiness due to meds         PLAN :  Patient continues to benefit from skilled intervention to address the above impairments. Continue treatment per established plan of care. to address goals. Recommend with staff: patient participate in grooming and feeding as able    Recommend next OT session: [facility] transfer when appropriate    Recommendation for discharge: (in order for the patient to meet his/her long term goals)  LTAC vs SNF pending progress.     This discharge recommendation:  Has been made in collaboration with the attending provider and/or case management    IF patient discharges home will need the following DME: TBD       SUBJECTIVE:   Patient stated I'm so tired.     OBJECTIVE DATA SUMMARY:   Cognitive/Behavioral Status:  Neurologic State: Drowsy  Orientation Level: Oriented to person;Disoriented to place; Disoriented to situation;Disoriented to time  Cognition: Impaired decision making    Functional Mobility and Transfers for ADLs:  Bed Mobility:  Rolling: Minimum assistance  Supine to Sit: Minimum assistance;Assist x2  Sit to Supine: Minimum assistance;Assist x2  Scooting: Minimum assistance    Transfers:    Balance:  Sitting: Intact; With support  Sitting - Static: Fair (occasional)    ADL Intervention:    Lower Body Dressing Assistance  Dressing Assistance: Total assistance(dependent)  Socks: Total assistance (dependent)  Position Performed: Seated edge of bed    Therapeutic Exercises:   Patient unable to maintain arousal for participation with UE HEP    Pain:  0/10    Activity Tolerance:   Poor, observed SOB with activity and drowsy    After treatment patient left in no apparent distress:   Supine in bed, Call bell within reach, Bed / chair alarm activated and Side rails x 3    COMMUNICATION/COLLABORATION:   The patients plan of care was discussed with: Physical therapy assistant, Registered nurse and Certified nursing assistant/patient care technician. OT/PT sessions occurred together for increased patient and clinician safety as pt with poor activity and would not tolerate x2.     Problem: Self Care Deficits Care Plan (Adult)  Goal: *Acute Goals and Plan of Care (Insert Text)  Description: Pt will be mod I sup <> sit in prep for EOB ADLs  Pt will be mod I grooming sitting EOB  Pt will be mod A LE dressing sitting EOB/long sit  Pt will be mod I sit <>  prep for toileting LRAD  Pt will be min A toileting/toilet transfer/cloth mgmt LRAD  Pt will be I following UE HEP in prep for self care tasks     Outcome: Progressing Towards Goal Bertrand Castaneda, OTR/L  Time Calculation: 14 mins

## 2021-04-27 NOTE — PROGRESS NOTES
Comprehensive Nutrition Assessment    Type and Reason for Visit: Reassess(goal)    Nutrition Recommendations/Plan:   Continue current diet    Nursing to monitor BM, I/Os, PO % intake    Nutrition Assessment:  Admit 2/2 respiratory failure/Covid+. Currently in ICU for monitoring, on HF NC. BRAT diet PTA 2/2 c.diff. Poor appetite PTA. SLP following, rec'd NDD2/NTL diet- remains ordered. Plans for MBS as able. PO intakes documented as 50-75%, RN reports intakes fair-good. Denies significant issues consuming PO d/t SOB. Labs: , BG , BUN 30, Cr 1.35, AST 42, Creat 1.11, BUN 25, K 3.0, Mg 1.7. Meds: cyclobenzaprine, statin, furosemide, insulin, synthroid, KCl, sertraline. Malnutrition Assessment:  Malnutrition Status:  Mild malnutrition    Context:  Acute illness     Findings of the 6 clinical characteristics of malnutrition:   Energy Intake:  1 - 75% or less of est energy req for 7 or more days  Weight Loss:  No significant weight loss     Body Fat Loss:  No significant body fat loss,     Muscle Mass Loss:  No significant muscle mass loss,    Fluid Accumulation:  No significant fluid accumulation,      Nutrition Related Findings:  NFPE not performed. Appears nourished. Denies N/V/C/D. BM 4/27. Reports C/S issues. Pt lacks top or bottom rows of teeth. Reports has dentures at home and only wears them out to eat. No edema documented. Wounds:    Moisture associate skin damage(Inner groin moisture ass. skin damage)       Current Nutrition Therapies:  DIET CARDIAC Mechanical Soft; 2 Nectar/2 Mildly Thick    Anthropometric Measures:  · Height:  5' 5.5\" (166.4 cm)  · Current Body Wt:  94.3 kg (207 lb 14.3 oz)(4/26)   · Admission Body Wt:  218 lb 0.6 oz    · Ideal Body Wt:  128 lbs:  162.4 %   · Adjusted Body Weight: (68kg; utilizing geriatric IBW 59kg)   · BMI Category:  Obese class 2 (BMI 35.0-39. 9)       Nutrition Diagnosis:   · Biting/chewing (masticatory) difficulty related to partial or complete edentulism as evidenced by poor intake prior to admission(SLP rec'd Ground/NTL diet)    Nutrition Interventions:   Food and/or Nutrient Delivery: Continue current diet  Nutrition Education and Counseling: No recommendations at this time  Coordination of Nutrition Care: No recommendation at this time    Goals:  EEN >75% PO intake, BG <180 mg/dL, Wt maintenance +/- 1 kg per wk.        Nutrition Monitoring and Evaluation:   Behavioral-Environmental Outcomes: None identified  Food/Nutrient Intake Outcomes: Food and nutrient intake  Physical Signs/Symptoms Outcomes: Weight, Biochemical data    Discharge Planning:    Continue current diet     Electronically signed by Trey Guevara on 4/27/2021 at 2:01 PM    Contact:

## 2021-04-27 NOTE — PROGRESS NOTES
Writer spoke with [facility], liaison at [facility]. Kath states she could arrange for patient's daughter Indiana Sanchez to come tour the facility If she wanted. Kath states she will reach out to Indiana Sanchez as well. At this time, Indiana Sanchez is opposed to patient going to [facility]. CM will continue to follow.

## 2021-04-27 NOTE — PROGRESS NOTES
Pulmonary and Critical Care Progress Note    Subjective:     Patient seen and examined in her room in the ICU this afternoon, no acute events overnight. Patient has been on high flow nasal cannula oxygen, yesterday her saturations were dropped and therefore her FiO2 was increased to 60%. She was confused last night  Seems to be better from a mentation standpoint as well however she appears to be sundowning at night. I discussed the case in detail with the bedside nursing staff and the respiratory therapist.    ABG this morning showed hypoxia  Tends to hold her breath and bears down periodically  Chest x-ray today continues to show bilateral patchy infiltrates, however there appears to be a slight improvement. Review of Systems:  Limited review of system because patient is a poor historian.     Current Facility-Administered Medications   Medication Dose Route Frequency Provider Last Rate Last Admin    potassium chloride (KLOR-CON) packet for solution 40 mEq  40 mEq Oral Q4H Khurram Nesbitt MD        magnesium sulfate 2 g/50 ml IVPB (premix or compounded)  2 g IntraVENous ONCE Khurram Nesbitt MD        guaiFENesin ER (MUCINEX) tablet 600 mg  600 mg Oral Q12H Emory Medrano MD   600 mg at 04/27/21 0816    albuterol-ipratropium (DUO-NEB) 2.5 MG-0.5 MG/3 ML  3 mL Nebulization Q6H PRN Emory Medrano MD        alum-mag hydroxide-simeth (MYLANTA) oral suspension 30 mL  30 mL Oral Q4H PRN Donnie Gonzalez MD   30 mL at 04/26/21 1507    famotidine (PEPCID) tablet 20 mg  20 mg Oral DAILY Donnie Gonzalez MD   20 mg at 04/27/21 0816    furosemide (LASIX) injection 40 mg  40 mg IntraVENous BID Media Caller, MD   40 mg at 04/27/21 0816    desitin/nystatin (1:1) topical compound   Topical BID Donnie Gonzalez MD   Given at 04/27/21 0900    hydrOXYzine pamoate (VISTARIL) capsule 25 mg  25 mg Oral QID PRN Jenae CARRASCO MD   25 mg at 04/26/21 2205    enoxaparin (LOVENOX) injection 90 mg  1 mg/kg SubCUTAneous Q12H Janae CARRASCO MD   90 mg at 04/26/21 2205    benzocaine-menthoL (CEPACOL) lozenge 1 Lozenge  1 Lozenge Mucous Membrane PRN Fransisco Torres MD   1 Lozenge at 04/09/21 1640    nystatin (MYCOSTATIN) 100,000 unit/mL oral suspension 500,000 Units  500,000 Units Oral QID Fransisco Torres MD   500,000 Units at 04/27/21 0816    dextrose (D50W) injection syrg 12.5-25 g  25-50 mL IntraVENous PRN Fransisco Torres MD        insulin lispro (HUMALOG) injection   SubCUTAneous AC&HS Fransisco Torres MD   2 Units at 04/27/21 6469    atorvastatin (LIPITOR) tablet 10 mg  10 mg Oral DAILY Ami Loera MD   10 mg at 04/27/21 0816    levothyroxine (SYNTHROID) tablet 88 mcg  88 mcg Oral ACB Ami Loera MD   88 mcg at 04/27/21 0816    [Held by provider] lisinopril-hydroCHLOROthiazide (PRINZIDE, ZESTORETIC) 10-12.5 mg per tablet 1 Tab  1 Tab Oral DAILY Ami Loera MD   Stopped at 04/15/21 0900    [Held by provider] meloxicam (MOBIC) tablet 7.5 mg  7.5 mg Oral DAILY Ami Loera MD   Stopped at 04/19/21 0900    pantoprazole (PROTONIX) tablet 20 mg  20 mg Oral DAILY Ami Loera MD   20 mg at 04/27/21 0816    sertraline (ZOLOFT) tablet 100 mg  100 mg Oral DAILY Ami Loera MD   100 mg at 04/27/21 0816    cyclobenzaprine (FLEXERIL) tablet 10 mg  10 mg Oral TID Ami Loera MD   10 mg at 04/27/21 0816    glucose chewable tablet 16 g  4 Tab Oral PRN Ami Loera MD        dextrose (D50W) injection syrg 12.5-25 g  25-50 mL IntraVENous PRN Ami Loera MD        glucagon [facility]) injection 1 mg  1 mg IntraMUSCular PRN Ami Loera MD        acetaminophen (TYLENOL) tablet 650 mg  650 mg Oral Q6H PRN Ami Loera MD   650 mg at 04/26/21 1507    melatonin tablet 5 mg  5 mg Oral QHS PRN Ami MD Luz Maria   5 mg at 04/26/21 2205    ondansetron (ZOFRAN) injection 4 mg  4 mg IntraVENous Q4H PRN Florinda Saldana MD   4 mg at 21 0739          No Known Allergies        Objective:     Blood pressure 102/73, pulse (!) 113, temperature 98.1 °F (36.7 °C), resp. rate 24, height 5' 4.49\" (1.638 m), weight 94.3 kg (207 lb 14.3 oz), SpO2 98 %, not currently breastfeeding. Temp (24hrs), Av.3 °F (36.8 °C), Min:97.9 °F (36.6 °C), Max:98.7 °F (37.1 °C)      Intake and Output:  Current Shift: No intake/output data recorded. Last 3 Shifts:  190 -  0700  In: 10   Out: 650 [Urine:450; Drains:200]    Physical Exam:      General: Lying in bed comfortably, no acute distress, on high flow oxygen. Eye: Reactive, symmetric  Throat and Neck: Supple, full facemask in place. Lung: Reduced air entry bilaterally with scattered bilateral crackles, improved. No wheezing. No significant change. Heart: S1+S2. No murmurs. Abdomen: soft, non-tender. Bowel sounds normal. No masses; obese  Extremities: No significant edema  : Not done, Hyde catheter in place. She was given Lasix and is making decent urine output. Skin: No cyanosis  Neurologic:  Currently awake and alert. Appears to be intact.       Lab/Data Review:    Recent Results (from the past 24 hour(s))   GLUCOSE, POC    Collection Time: 21  1:18 PM   Result Value Ref Range    Glucose (POC) 112 (H) 65 - 100 mg/dL    Performed by RACHNA BURGESS    GLUCOSE, POC    Collection Time: 21  5:21 PM   Result Value Ref Range    Glucose (POC) 239 (H) 65 - 100 mg/dL    Performed by RACHNA BURGESS    GLUCOSE, POC    Collection Time: 21 10:19 PM   Result Value Ref Range    Glucose (POC) 149 (H) 65 - 100 mg/dL    Performed by Janae Landry    BLOOD GAS, ARTERIAL    Collection Time: 21  5:15 AM   Result Value Ref Range    pH 7.47 (H) 7.35 - 7.45      PCO2 46 (H) 35 - 45 mmHg    PO2 65 (L) 75 - 100 mmHg    O2 SAT 94 (L) >95 %    BICARBONATE 32 (H) 22 - 26 mmol/L    BASE EXCESS 8.7 (H) 0 - 2 mmol/L    O2 METHOD BiPAP FIO2 55.0 %    Tidal volume 500      SET RATE 18      EPAP/CPAP/PEEP 8.0      Sample source Arterial      SITE Right Brachial     METABOLIC PANEL, COMPREHENSIVE    Collection Time: 04/27/21  5:30 AM   Result Value Ref Range    Sodium 136 136 - 145 mmol/L    Potassium 3.0 (L) 3.5 - 5.1 mmol/L    Chloride 97 97 - 108 mmol/L    CO2 31 21 - 32 mmol/L    Anion gap 8 5 - 15 mmol/L    Glucose 175 (H) 65 - 100 mg/dL    BUN 30 (H) 6 - 20 mg/dL    Creatinine 1.35 (H) 0.55 - 1.02 mg/dL    BUN/Creatinine ratio 22 (H) 12 - 20      GFR est AA 46 (L) >60 ml/min/1.73m2    GFR est non-AA 38 (L) >60 ml/min/1.73m2    Calcium 9.8 8.5 - 10.1 mg/dL    Bilirubin, total 0.4 0.2 - 1.0 mg/dL    AST (SGOT) 30 15 - 37 U/L    ALT (SGPT) 35 12 - 78 U/L    Alk. phosphatase 78 45 - 117 U/L    Protein, total 6.5 6.4 - 8.2 g/dL    Albumin 2.7 (L) 3.5 - 5.0 g/dL    Globulin 3.8 2.0 - 4.0 g/dL    A-G Ratio 0.7 (L) 1.1 - 2.2     CBC W/O DIFF    Collection Time: 04/27/21  5:30 AM   Result Value Ref Range    WBC 10.5 3.6 - 11.0 K/uL    RBC 3.81 3.80 - 5.20 M/uL    HGB 11.2 (L) 11.5 - 16.0 g/dL    HCT 34.2 (L) 35.0 - 47.0 %    MCV 89.8 80.0 - 99.0 FL    MCH 29.4 26.0 - 34.0 PG    MCHC 32.7 30.0 - 36.5 g/dL    RDW 18.3 (H) 11.5 - 14.5 %    PLATELET 887 369 - 319 K/uL    MPV 10.6 8.9 - 12.9 FL   MAGNESIUM    Collection Time: 04/27/21  5:30 AM   Result Value Ref Range    Magnesium 1.7 1.6 - 2.4 mg/dL       XR CHEST PORT   Final Result      XR CHEST PORT   Final Result   No interval change. XR ABD (KUB)   Final Result   Mild gastric distention. XR CHEST PORT   Final Result   No interval change. XR CHEST PORT   Final Result   Bilateral airspace disease and small left pleural effusion. XR CHEST PORT   Final Result   No significant interval change. XR CHEST PORT   Final Result   1. There is persistent mixed interstitial and airspace disease diffusely   throughout the lungs.  There has been an interval decrease in the airspace pattern. These findings are consistent with an atypical pneumonia. 2.  The remainder of this examination is unchanged. XR CHEST PORT   Final Result      XR CHEST PORT   Final Result      XR CHEST PORT   Final Result   No significant interval change. XR CHEST PORT   Final Result   Increased moderate diffuse bilateral airspace opacities, likely a combination of   edema and airspace disease. Developing ARDS not excluded. XR CHEST PORT   Final Result      XR CHEST PORT   Final Result      XR CHEST PORT   Final Result      XR CHEST PORT   Final Result   There is persistent airspace disease within the mid to lower lung   zone predominance. This represents a heterogeneous pattern as might be   associated with an atypical pneumonia. There has been little interval change. XR CHEST PORT   Final Result      XR CHEST PORT   Final Result      XR CHEST PORT   Final Result   No significant interval change. XR CHEST PORT   Final Result      XR CHEST PORT    (Results Pending)   XR CHEST PORT    (Results Pending)   XR CHEST PORT    (Results Pending)   CT HEAD WO CONT    (Results Pending)       CT Results  (Last 48 hours)    None            Assessment:     1. Acute respiratory failure with hypoxia  2. COVID-19 pneumonia  3. Shortness of breath  4.  Cough  5. Generalized weakness  6. Acute kidney injury  7. Obstructive sleep apnea on CPAP  8. Obesity    Plan:     Patient was saturating well at 55 L and 50% FiO2 on high flow nasal cannula oxygen, last night it was increased to 60% as she dropped her saturations. Blood gas from today showed 7.4 0/46/65/32/94 %  She is now saturating 96%. Cut down her oxygen flow to 40 L now also decrease her FiO2 to 50%. Repeat blood gas in the morning   Comfortable otherwise likely okay to transfer out of the ICU tomorrow.   We will get CT scan of chest to further evaluate extent of fibrosis from Covid related infection  We will continue with diuresis, Continue with diuresis. She is now negative for COVID-19. ID is also on the case. Continue with high flow oxygen, wean down as tolerated to keep saturation more than 92%. Use BiPAP as needed and then nocturnally. Patient has acute respiratory failure with hypoxia due to COVID-19 pneumonia  Status post 5 days of remdesivir  Currently on Decadron 4 mg p.o. every 24 hours  Off azithromycin, on IV Zosyn per ID  Status post tocilizumab on 4/5/2021  Vitamin C and zinc  On therapeutic dose of Lovenox  Echo normal LVEF    Renal function stable today. Monitor renal function with fluid changes  Check electrolytes and replace as needed    DVT and GI prophylaxis    Case discussed in detail with RN, RT, and care team  Thank you for involving me in the care of this patient  I will follow with you closely during hospitalization     Overall prognosis appears to be guarded    Greater than 45 minutes were spent in direct care with this patient.     Ani Connolly MD  [facility]  4/27/2021

## 2021-04-27 NOTE — PROGRESS NOTES
Hospitalist Progress Note Daily Progress Note: 4/27/2021 33-year-old female with acute hypoxic respiratory failure secondary to Covid pneumonia. Subjective: The patient is seen for follow  up. Remains in icu. Sleeping, easily awoken, pleasantly confused. Now hfnc ~ 65%, adequate sats, Yesterday titrated to ~ 30%, desats though with sleep, back to 65%. She does not offer specific complaints. Discussed with RN. Problem List: 
Problem List as of 4/27/2021 Date Reviewed: 11/21/2017 Codes Class Noted - Resolved Respiratory failure ([address] ICD-10-CM: J96.90 ICD-9-CM: 518.81  4/4/2021 - Present Ductal carcinoma in situ (DCIS) of left breast ICD-10-CM: D05.12 
ICD-9-CM: 233.0  11/21/2017 - Present Overview Addendum 11/21/2017  1:39 PM by Aurora Peres MD  
  11/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 NM 6% Medications reviewed Current Facility-Administered Medications Medication Dose Route Frequency  guaiFENesin ER (MUCINEX) tablet 600 mg  600 mg Oral Q12H  
 albuterol-ipratropium (DUO-NEB) 2.5 MG-0.5 MG/3 ML  3 mL Nebulization Q6H PRN  
 alum-mag hydroxide-simeth (MYLANTA) oral suspension 30 mL  30 mL Oral Q4H PRN  
 famotidine (PEPCID) tablet 20 mg  20 mg Oral DAILY  furosemide (LASIX) injection 40 mg  40 mg IntraVENous BID  desitin/nystatin (1:1) topical compound   Topical BID  hydrOXYzine pamoate (VISTARIL) capsule 25 mg  25 mg Oral QID PRN  
 enoxaparin (LOVENOX) injection 90 mg  1 mg/kg SubCUTAneous Q12H  
 benzocaine-menthoL (CEPACOL) lozenge 1 Lozenge  1 Lozenge Mucous Membrane PRN  
 nystatin (MYCOSTATIN) 100,000 unit/mL oral suspension 500,000 Units  500,000 Units Oral QID  dextrose (D50W) injection syrg 12.5-25 g  25-50 mL IntraVENous PRN  
 insulin lispro (HUMALOG) injection   SubCUTAneous AC&HS  atorvastatin (LIPITOR) tablet 10 mg  10 mg Oral DAILY  levothyroxine (SYNTHROID) tablet 88 mcg  88 mcg Oral ACB  [Held by provider] lisinopril-hydroCHLOROthiazide (PRINZIDE, ZESTORETIC) 10-12.5 mg per tablet 1 Tab  1 Tab Oral DAILY  [Held by provider] meloxicam (MOBIC) tablet 7.5 mg  7.5 mg Oral DAILY  pantoprazole (PROTONIX) tablet 20 mg  20 mg Oral DAILY  sertraline (ZOLOFT) tablet 100 mg  100 mg Oral DAILY  cyclobenzaprine (FLEXERIL) tablet 10 mg  10 mg Oral TID  glucose chewable tablet 16 g  4 Tab Oral PRN  
 dextrose (D50W) injection syrg 12.5-25 g  25-50 mL IntraVENous PRN  
 glucagon (GLUCAGEN) injection 1 mg  1 mg IntraMUSCular PRN  
 acetaminophen (TYLENOL) tablet 650 mg  650 mg Oral Q6H PRN  
 melatonin tablet 5 mg  5 mg Oral QHS PRN  
 ondansetron (ZOFRAN) injection 4 mg  4 mg IntraVENous Q4H PRN Review of Systems:  
Review of Systems Constitutional: Positive for malaise/fatigue. Negative for chills and fever. HENT: Negative. Eyes: Negative. Respiratory: Positive for shortness of breath. Negative for cough, hemoptysis, sputum production and wheezing. Cardiovascular: Negative. Gastrointestinal: Negative. Genitourinary: Negative. Musculoskeletal: Negative. Skin: Negative. Neurological: Positive for weakness. Negative for dizziness, focal weakness and headaches. Endo/Heme/Allergies: Negative. Psychiatric/Behavioral: Negative. Objective:  
Physical Exam:  
 
Visit Vitals /64 (BP 1 Location: Left arm) Pulse (!) 116 Temp 98.1 °F (36.7 °C) Resp 25 Ht 5' 4.49\" (1.638 m) Wt 94.3 kg (207 lb 14.3 oz) SpO2 96% Breastfeeding No  
BMI 35.15 kg/m² O2 Flow Rate (L/min): 50 l/min O2 Device: Hi flow nasal cannula Temp (24hrs), Av.3 °F (36.8 °C), Min:97.9 °F (36.6 °C), Max:98.7 °F (37.1 °C) No intake/output data recorded.  1901 -  0700 In: 10 Out: 650 [Urine:450; Drains:200] General:         Appears a bit lethargic, alert, not appearing distressed, pleasantly confused EENT:              EOMI. Anicteric sclerae.  MMM Resp:              Decreased air entry bilaterally + bilateral bibasilar crackles CV:                 Regular  rhythm, S1 plus S2, no murmurs rubs or   gallops  No edema GI:                   Soft, Non distended, Non tender.  +Bowel sounds Neurologic:      Alert and oriented X 2, normal speech, Psych:    fair insight. Not anxious nor agitated Skin:                No rashes. No jaundice. IV infiltrated rt forearm. Data Review:  
   
Recent Days: 
Recent Labs  
  04/27/21 
0530 04/26/21 
0750 04/25/21 
0428 WBC 10.5 12.6* 12.0*  
HGB 11.2* 12.8 13.5 HCT 34.2* 38.9 40.7  313 256 Recent Labs  
  04/27/21 
0530 04/26/21 
0750 04/25/21 
3425  139  --   
K 3.0* 2.9*  --   
CL 97 99  --   
CO2 31 30  --   
* 228*  --   
BUN 30* 27*  --   
CREA 1.35* 1.42*  --   
CA 9.8 9.9  --   
MG 1.7 2.1 1.4* ALB 2.7* 2.8*  --   
TBILI 0.4 0.4  --   
ALT 35 37  --   
 
Recent Labs  
  04/27/21 
0515 04/26/21 
0435 PH 7.47* 7.49* PCO2 46* 41  
PO2 65* 47* HCO3 32* 31* FIO2 55.0 65.0  
 
 
24 Hour Results: 
Recent Results (from the past 24 hour(s)) GLUCOSE, POC Collection Time: 04/26/21  1:18 PM  
Result Value Ref Range Glucose (POC) 112 (H) 65 - 100 mg/dL Performed by Cain Jefferson, POC Collection Time: 04/26/21  5:21 PM  
Result Value Ref Range Glucose (POC) 239 (H) 65 - 100 mg/dL Performed by Cain Jefferson, POC Collection Time: 04/26/21 10:19 PM  
Result Value Ref Range Glucose (POC) 149 (H) 65 - 100 mg/dL Performed by Crispin Milian   
BLOOD GAS, ARTERIAL Collection Time: 04/27/21  5:15 AM  
Result Value Ref Range pH 7.47 (H) 7.35 - 7.45    
 PCO2 46 (H) 35 - 45 mmHg PO2 65 (L) 75 - 100 mmHg O2 SAT 94 (L) >95 % BICARBONATE 32 (H) 22 - 26 mmol/L  
 BASE EXCESS 8.7 (H) 0 - 2 mmol/L  
 O2 METHOD BiPAP    
 FIO2 55.0 % Tidal volume 500 SET RATE 18    
 EPAP/CPAP/PEEP 8.0  Sample source Arterial    
 SITE Right Brachial    
METABOLIC PANEL, COMPREHENSIVE Collection Time: 04/27/21  5:30 AM  
Result Value Ref Range Sodium 136 136 - 145 mmol/L Potassium 3.0 (L) 3.5 - 5.1 mmol/L Chloride 97 97 - 108 mmol/L  
 CO2 31 21 - 32 mmol/L Anion gap 8 5 - 15 mmol/L Glucose 175 (H) 65 - 100 mg/dL BUN 30 (H) 6 - 20 mg/dL Creatinine 1.35 (H) 0.55 - 1.02 mg/dL BUN/Creatinine ratio 22 (H) 12 - 20 GFR est AA 46 (L) >60 ml/min/1.73m2 GFR est non-AA 38 (L) >60 ml/min/1.73m2 Calcium 9.8 8.5 - 10.1 mg/dL Bilirubin, total 0.4 0.2 - 1.0 mg/dL AST (SGOT) 30 15 - 37 U/L  
 ALT (SGPT) 35 12 - 78 U/L Alk. phosphatase 78 45 - 117 U/L Protein, total 6.5 6.4 - 8.2 g/dL Albumin 2.7 (L) 3.5 - 5.0 g/dL Globulin 3.8 2.0 - 4.0 g/dL A-G Ratio 0.7 (L) 1.1 - 2.2    
CBC W/O DIFF Collection Time: 04/27/21  5:30 AM  
Result Value Ref Range WBC 10.5 3.6 - 11.0 K/uL  
 RBC 3.81 3.80 - 5.20 M/uL  
 HGB 11.2 (L) 11.5 - 16.0 g/dL HCT 34.2 (L) 35.0 - 47.0 % MCV 89.8 80.0 - 99.0 FL  
 MCH 29.4 26.0 - 34.0 PG  
 MCHC 32.7 30.0 - 36.5 g/dL  
 RDW 18.3 (H) 11.5 - 14.5 % PLATELET 403 249 - 708 K/uL MPV 10.6 8.9 - 12.9 FL  
MAGNESIUM Collection Time: 04/27/21  5:30 AM  
Result Value Ref Range Magnesium 1.7 1.6 - 2.4 mg/dL Assessment/  
 
Patient Active Problem List  
Diagnosis Code  Ductal carcinoma in situ (DCIS) of left breast D05.12  Respiratory failure ([address] J96.90 Acute respiratory failure with hypoxia secondary to COVID-19 pna:  
-patient presented with acute respiratory failure with hypoxia requiring high flow nasal cannula for ventilatory support and based on patient's clinical presentation secondary to COVID-19 pneumonia, patient does not meet sepsis criteria at this time Completed Redemsivir Continue Decadron 6 mg IV daily Completed  azithromycin Continue to monitor respiratory status, moved to icu 4/18 am 2/2 fatigue, hypoxia on cpap, in icu appeared more settled Aggressive pulmonary rehabilitation Pulling off mask has been intermittent problem, needs close monitoring. Mitts were attempted though anxiety led to much increased anxiety/agitation. Pulmonology consult appreciated,  
[place] slow wean d/t post covid fibrosis. Infectious disease consult appreciated, off empiric zosyn. Hypertensioncurrently resolved 
  
Hypothyroidism 
continue Synthroid 
  
Gastroesophageal reflux disease 
continue Protonix once daily 
  
Hyperglycemia due to type 2 diabetes  
likely iatrogenic/steroids, has been showing better control, up trend over the past 24 hrs, monitor and adjust ssi 
continue lispro ssi achs Hypomagnesemiareplete/follow Hypokalemia - replete/follow 
  
Depression/anxietycontinue home Zoloft 
  
Hx breast ca/meningioma. Xrt with breat ca 1.5 years ago. 
  
ProphylaxisLovenox FENcardiac diet, replete potassium and magnesium Full code, POA is daughter Colletta Copping [phone]. Dispositionpending course, slow wean. Possible disposition to LTAC-  appreciated. Daughter Colletta Copping does not want her moved anywhere and wants her to stay in icu. Pt does still occasionally removed supplemental O2 with precipitous sao2 drops. time spent 35 minutes involving direct patient care as well as reviewing patient's labs and coordination of care with nursing staff 
  
25.0 - 29.9 Overweight / Body mass index is 35.15 kg/m². Care Plan discussed with: Patient/Family, Nurse,  and Consultant . Tiffani Prado MD

## 2021-04-27 NOTE — PROGRESS NOTES
Infectious Disease Progress Note           Subjective:   No new complaints, appears lethargic today, remains on high flow O2 at 60%. Afebrile, and WBC trending down on routine labs   Objective:   Physical Exam:     Visit Vitals  /69 (BP 1 Location: Left arm)   Pulse (!) 112   Temp 98.6 °F (37 °C)   Resp 23   Ht 5' 5.5\" (1.664 m)   Wt 207 lb 14.3 oz (94.3 kg)   SpO2 94%   Breastfeeding No   BMI 34.07 kg/m²    O2 Flow Rate (L/min): 50 l/min O2 Device: Hi flow nasal cannula    Temp (24hrs), Av.5 °F (36.9 °C), Min:98.1 °F (36.7 °C), Max:98.7 °F (37.1 °C)    No intake/output data recorded.  1901 -  0700  In: 10   Out: 650 [Urine:450; Drains:200]    General: NAD, lethargic, confused   HEENT: MICHELLE, Moist mucosa, on high flow   Lungs: CTA b/l, no wheeze/rhonchi   Heart: S1S2+, RRR, no murmur  Abdo: Soft, NT, ND, +BS   Exts: Trace edema, + pulses b/l   Skin: No wounds, No rashes or lesions    Data Review:       Recent Days:  Recent Labs     21  0530 21  0750 21  0428   WBC 10.5 12.6* 12.0*   HGB 11.2* 12.8 13.5   HCT 34.2* 38.9 40.7    313 256     Recent Labs     21  0530 21  0750   BUN 30* 27*   CREA 1.35* 1.42*     Lab Results   Component Value Date/Time    C-Reactive protein 3.52 (H) 2021 04:00 AM      Microbiology: None     Diagnostics   CXR Results  (Last 48 hours)               21 0424  XR CHEST PORT Final result    Narrative:  1 view comparison yesterday       Increased interstitial edema and continued hypoinflation. No effusion or   pneumothorax. Normal heart and mediastinum       21 0316  XR CHEST PORT Final result    Impression:  No interval change. Narrative:  EXAMINATION: XR CHEST PORT       HISTORY: Respiratory failure       COMPARISON: 2021       FINDINGS: Single view. There are bilateral interstitial and airspace opacities,   similar when compared with prior studies. There is no pneumothorax.  Heart size unchanged. Assessment/Plan     1. COVID-19 pneumonitis, S/p appropriate Tx. Repeat test for COVID-19 neg on 04/17 neg        Remains on high flow w decreasing O2 requirements. Suspected pulmonary fibrosis       Increased interstitial edema and continued hypoinflation on CXR (04/27)       Afebrile, WBC trending down on todays labs       Continue to monitor off antibiotics for now       Routine labs in the morning    2. Acute on chronic renal failure: Trending down on todays labs     3. Generalized weakness due to prolonged illness,    4.  Anemia of chronic disease: Stable hgb     Raffy Alonso MD     4/27/2021

## 2021-04-27 NOTE — PROGRESS NOTES
Pt has not voided this shift. Bladder scan shows less than 200ml. Dr Grisel Julio made aware and orders received. Also made Dr. Montana Estimable aware of chest CT results. Orders received.

## 2021-04-28 NOTE — PROGRESS NOTES
SPEECH LANGUAGE PATHOLOGY DYSPHAGIA TREATMENT  Patient: Librado Valladares (25 y.o. female)  Date: 4/28/2021  Diagnosis: Respiratory failure ([facility] [address] [J96.90] <principal problem not specified>       Precautions: aspiration     ASSESSMENT:  Patient alerts to verbal cues but remains confused. She is on HFNC 40%. O2 saturations 97% at baseline. Nsg tech reported patient consumed meals w/o difficulty. Patient edentulous and reports has dentures but not with her. Administered thin liquids via cup and straw to trial. A-P transit slightly rapid w/ gulping of swallow. Swallow initiation timely. Once swallow initiated, HLE and protraction reduced to palpation s/t general weakness. No overt s/sx of aspiration noted w/ all thin liquid trials. Bolus control improved w/ nectar thick liquids and no clinical indicators of aspiration noted. Patient w/ minimal to no mastication of soft solid cracker. Cracker piece eventually dissolved in mouth. Patient eventually cleared. O2 saturations remained stable. Due to patient's alertness, diet advancement is not indicated at this time. PLAN:  Recommendations and Planned Interventions:  Continue w/ mechanical soft, NECTAR thick liquids. Do not feed if not alert. Patient continues to benefit from skilled intervention to address the above impairments. Continue treatment per established plan of care. Frequency/Duration: Patient will be followed by speech-language pathology daily to address goals. Discharge Recommendations: To Be Determined     SUBJECTIVE:   Patient seen at bedside. She is resting. On HFNC. Has a 1:1.     OBJECTIVE:     CXR Results  (Last 48 hours)                 04/28/21 0400  XR CHEST PORT Final result    Impression:  Findings/impression:       Improving patchy bilateral interstitial airspace disease/edema. Small pleural   effusions. No evidence of pneumothorax. Cardiomediastinal contours are stable. Ongoing pneumomediastinum.        Visualized osseous structures are unchanged. Narrative:  Study: XR CHEST PORT       Clinical indication: Respiratory failure       Comparison: Chest x-ray 4/27/2021.           04/27/21 0424  XR CHEST PORT Final result    Narrative:  1 view comparison yesterday       Increased interstitial edema and continued hypoinflation. No effusion or   pneumothorax. Normal heart and mediastinum             CT Results  (Last 48 hours)                 04/27/21 1642  CT HEAD WO CONT Final result    Impression:  1. Stable right cavernous sinus meningioma. 2. No evidence of acute ischemia, hemorrhage or mass effect. 3. Mild small vessel ischemic change. Narrative:  Axial images from the skull base to the vertex were obtained without the use of   contrast. Bone windows were reviewed. Sagittal and coronal reformatted images   were also reviewed. All CT scans at this facility are performed using dose   reduction optimization techniques as appropriate to a performed exam including   the following:   automated exposure control, adjustments of the mA and/or kV   according to patient size, or use of iterative reconstruction technique. Comparison with MRI February 4, 2020. INDICATION: Confusion, history of meningioma, headache. There is no evidence of hemorrhage. There is no acute ischemia. Ventricles, sulci and cisterns are intact. Minimal periventricular white matter   changes are present. The previously noted right cavernous sinus meningioma   appears unchanged compared to the previous MRI. No new mass lesions or mass   effect. Left frontal scalp sebaceous cyst.   Bone windows show no acute abnormalities. The visualized portions of the orbits,   paranasal sinuses and mastoid air cells are unremarkable. Review of the   reconstructed images show no additional findings. 04/27/21 1642  CT CHEST WO CONT Final result    Impression:  1. Extensive bilateral probably groundglass infiltrates, nonspecific.  This could represent atypical pneumonia such as Covid pneumonia. Recommend clinical   evaluation. 2. Pneumomediastinum extending into the lower right neck, this may be related to   the extensive parenchymal changes. Recommend follow-up as clinically warranted. Dali Hunter, the patient's nurse, was notified at 5:18 PM       Narrative:  Axial images through the chest were obtained without contrast. Sagittal and   coronal reformatted images were reviewed. Thin section images were obtained. INDICATION: Evaluate for pulmonary fibrosis. There is extensive bilateral predominantly groundglass infiltrates throughout   both lungs. No significant peripheral fibrotic changes are noted. No significant   mediastinal or hilar lymphadenopathy. There is pneumomediastinum. No   pneumothorax. No gross endobronchial lesions. Bone windows and reconstructed   images show no acute bony findings. Incidental imaging through the upper abdomen   shows cholecystectomy clips. Small hiatal hernia. Cognitive and Communication Status:  Neurologic State: Drowsy  Orientation Level: Oriented to person, Oriented to place  Cognition: Decreased attention/concentration, Decreased command following        Safety/Judgement: Decreased awareness of environment, Decreased awareness of need for assistance      Pain:  Pain Scale 1: Numeric (0 - 10)  Pain Intensity 1: 0       After treatment:   Patient left in no apparent distress in bed and Call bell within reach    COMMUNICATION/EDUCATION:   Patient was educated regarding her deficit(s) of dysphagia, swallow safety precautions, diet recs and POC. She demonstrated Fair understanding as evidenced by impaired cognition. The patient's plan of care including recommendations, planned interventions, and recommended diet changes were discussed with: Certified nursing assistant/patient care technician.      MOHINDER Thompson M.S., CCC-SLP  Time Calculation: 15 mins           Problem: Dysphagia (Adult)  Goal: *Acute Goals and Plan of Care (Insert Text)  Description: Speech Therapy Goals  Initiated 4/20/2021  -Patient will participate in modified barium swallow study within 7 day(s). [ ] Not met  [ ]  MET   [ x] Progressing  [ ] Discontinue  -Patient will tolerate dysphagia 2 diet with nectar thick liquids without signs/symptoms of aspiration given minimal cues within 7 day(s). [ ] Not met  [ ]  MET   [ x] Progressing  [ ] Discontinue  -Patient will demonstrate understanding of swallow safety precautions and aspiration precautions, diet recs with minimal cues within 7 day(s).         [ ] Not met  [ ]  MET   [ x] Progressing  [ ] Discontinue      Outcome: Progressing Towards Goal

## 2021-04-28 NOTE — PROGRESS NOTES
Pulmonary and Critical Care Progress Note    Subjective:     Patient seen and examined in her room in the ICU this afternoon, no acute events overnight. Patient has been on high flow nasal cannula oxygen, yesterday her saturations were dropped and therefore her FiO2 was increased to 60%. She does well during the day but at night she seems to get confused and agitated. She then requires higher oxygen flow. She definitely has sundowning phenomena going on   I discussed the case in detail with the bedside nursing staff and the respiratory therapist.    ABG this morning showed hypoxia. Tends to hold her breath and bears down periodically    The scan of chest results from yesterday were reviewed  Review of Systems:  Limited review of system because patient is a poor historian.     Current Facility-Administered Medications   Medication Dose Route Frequency Provider Last Rate Last Admin    LORazepam (ATIVAN) injection 1 mg  1 mg IntraVENous Q4H PRN Ibrahima Mae MD   1 mg at 04/28/21 0427    sodium chloride (OCEAN) 0.65 % nasal squeeze bottle 2 Spray  2 Spray Both Nostrils Q2H PRN Marika Bermeo MD   2 Spray at 04/27/21 1658    0.9% sodium chloride infusion 500 mL  500 mL IntraVENous CONTINUOUS Jacqualine Benny CARRASCO .6 mL/hr at 04/27/21 1914 500 mL at 04/27/21 1914    QUEtiapine (SEROquel) tablet 25 mg  25 mg Oral QHS Ibrahima Mae MD   25 mg at 04/27/21 2212    guaiFENesin ER (MUCINEX) tablet 600 mg  600 mg Oral Q12H Pily Urena MD   Stopped at 04/28/21 0900    albuterol-ipratropium (DUO-NEB) 2.5 MG-0.5 MG/3 ML  3 mL Nebulization Q6H PRN Pily Urena MD        alum-mag hydroxide-simeth (MYLANTA) oral suspension 30 mL  30 mL Oral Q4H PRN Jose Avalos MD   30 mL at 04/26/21 1507    famotidine (PEPCID) tablet 20 mg  20 mg Oral DAILY Winnie Marinelli MD   20 mg at 04/28/21 0930    [Held by provider] furosemide (LASIX) injection 40 mg  40 mg IntraVENous BID Anay Fátima Lopez MD   Stopped at 04/28/21 0900    desitin/nystatin (1:1) topical compound   Topical BID Trevor Toth MD   Given at 04/28/21 0930    hydrOXYzine pamoate (VISTARIL) capsule 25 mg  25 mg Oral QID PRN Krishna CARRASCO MD   25 mg at 04/28/21 0930    enoxaparin (LOVENOX) injection 90 mg  1 mg/kg SubCUTAneous Q12H Baeza Khurram Ryder MD   90 mg at 04/28/21 1124    benzocaine-menthoL (CEPACOL) lozenge 1 Lozenge  1 Lozenge Mucous Membrane PRN Trevor Toth MD   1 Lozenge at 04/09/21 1640    nystatin (MYCOSTATIN) 100,000 unit/mL oral suspension 500,000 Units  500,000 Units Oral QID Trevor Toth MD   500,000 Units at 04/28/21 1220    dextrose (D50W) injection syrg 12.5-25 g  25-50 mL IntraVENous PRN Trevor Toth MD        insulin lispro (HUMALOG) injection   SubCUTAneous AC&HS Trevor Toth MD   3 Units at 04/28/21 1124    atorvastatin (LIPITOR) tablet 10 mg  10 mg Oral DAILY Janeth Hernandez MD   10 mg at 04/28/21 0930    levothyroxine (SYNTHROID) tablet 88 mcg  88 mcg Oral ACB Janeth Hernandez MD   88 mcg at 04/28/21 0930    [Held by provider] lisinopril-hydroCHLOROthiazide (PRINZIDE, ZESTORETIC) 10-12.5 mg per tablet 1 Tab  1 Tab Oral DAILY Janeth Hernandez MD   Stopped at 04/15/21 0900    [Held by provider] meloxicam (MOBIC) tablet 7.5 mg  7.5 mg Oral DAILY Janeth Hernandez MD   Stopped at 04/19/21 0900    pantoprazole (PROTONIX) tablet 20 mg  20 mg Oral DAILY Janeth Hernandez MD   20 mg at 04/28/21 0930    sertraline (ZOLOFT) tablet 100 mg  100 mg Oral DAILY Janeth Hernandez MD   100 mg at 04/28/21 0930    glucose chewable tablet 16 g  4 Tab Oral PRN Janeth Hernandez MD        dextrose (D50W) injection syrg 12.5-25 g  25-50 mL IntraVENous PRN Janeth Hernandez MD        glucagon (GLUCAGEN) injection 1 mg  1 mg IntraMUSCular PRN Janeth Hernandez MD        acetaminophen (TYLENOL) tablet 650 mg  650 mg Oral Q6H PRN Yany Mclean MD   650 mg at 21 1507    melatonin tablet 5 mg  5 mg Oral QHS PRN Yany Mclean MD   5 mg at 21 2212    ondansetron (ZOFRAN) injection 4 mg  4 mg IntraVENous Q4H PRN Yany Mclean MD   4 mg at 21 0739          No Known Allergies        Objective:     Blood pressure (!) 87/69, pulse (!) 119, temperature 98.5 °F (36.9 °C), resp. rate 25, height 5' 5.5\" (1.664 m), weight 94.3 kg (207 lb 14.3 oz), SpO2 98 %, not currently breastfeeding. Temp (24hrs), Av.4 °F (36.9 °C), Min:97.8 °F (36.6 °C), Max:98.8 °F (37.1 °C)      Intake and Output:  Current Shift: 701 - 1900  In: 60 [P.O.:60]  Out: -   Last 3 Shifts: 1901 - 700  In: -   Out: 350 [Urine:350]    Physical Exam:      General: Lying in bed comfortably, no acute distress, on high flow oxygen. Lethargic, opens eyes only briefly. Eye: Reactive, symmetric  Throat and Neck: Supple, full facemask in place. Lung: Reduced air entry bilaterally with scattered bilateral crackles, improved. No wheezing. No significant change. Heart: S1+S2. No murmurs. Abdomen: soft, non-tender. Bowel sounds normal. No masses; obese  Extremities: No significant edema  : Not done, Hyde catheter in place. She was given Lasix and is making decent urine output. Skin: No cyanosis  Neurologic:  Currently awake and alert. Appears to be intact.       Lab/Data Review:    Recent Results (from the past 24 hour(s))   GLUCOSE, POC    Collection Time: 21  4:13 PM   Result Value Ref Range    Glucose (POC) 171 (H) 65 - 100 mg/dL    Performed by RACHNA BURGESS    COVID-19 RAPID TEST    Collection Time: 21  6:15 PM   Result Value Ref Range    Specimen source Nasopharyngeal      COVID-19 rapid test Not Detected Not Detected     GLUCOSE, POC    Collection Time: 21 10:17 PM   Result Value Ref Range    Glucose (POC) 175 (H) 65 - 100 mg/dL    Performed by [id number], COMPREHENSIVE Collection Time: 04/28/21  5:35 AM   Result Value Ref Range    Sodium 138 136 - 145 mmol/L    Potassium 4.4 3.5 - 5.1 mmol/L    Chloride 102 97 - 108 mmol/L    CO2 30 21 - 32 mmol/L    Anion gap 6 5 - 15 mmol/L    Glucose 184 (H) 65 - 100 mg/dL    BUN 35 (H) 6 - 20 mg/dL    Creatinine 1.41 (H) 0.55 - 1.02 mg/dL    BUN/Creatinine ratio 25 (H) 12 - 20      GFR est AA 44 (L) >60 ml/min/1.73m2    GFR est non-AA 36 (L) >60 ml/min/1.73m2    Calcium 9.7 8.5 - 10.1 mg/dL    Bilirubin, total 0.4 0.2 - 1.0 mg/dL    AST (SGOT) 29 15 - 37 U/L    ALT (SGPT) 32 12 - 78 U/L    Alk. phosphatase 76 45 - 117 U/L    Protein, total 6.4 6.4 - 8.2 g/dL    Albumin 2.6 (L) 3.5 - 5.0 g/dL    Globulin 3.8 2.0 - 4.0 g/dL    A-G Ratio 0.7 (L) 1.1 - 2.2     GLUCOSE, POC    Collection Time: 04/28/21  7:15 AM   Result Value Ref Range    Glucose (POC) 191 (H) 65 - 100 mg/dL    Performed by [address] [place] [address], POC    Collection Time: 04/28/21 10:59 AM   Result Value Ref Range    Glucose (POC) 225 (H) 65 - 100 mg/dL    Performed by [facility] CHEST PORT   Final Result   Findings/impression:      Improving patchy bilateral interstitial airspace disease/edema. Small pleural   effusions. No evidence of pneumothorax. Cardiomediastinal contours are stable. Ongoing pneumomediastinum. Visualized osseous structures are unchanged. CT HEAD WO CONT   Final Result   1. Stable right cavernous sinus meningioma. 2. No evidence of acute ischemia, hemorrhage or mass effect. 3. Mild small vessel ischemic change. CT CHEST WO CONT   Final Result   1. Extensive bilateral probably groundglass infiltrates, nonspecific. This could   represent atypical pneumonia such as Covid pneumonia. Recommend clinical   evaluation. 2. Pneumomediastinum extending into the lower right neck, this may be related to   the extensive parenchymal changes. Recommend follow-up as clinically warranted.       Shabbir, the patient's nurse, was notified at 5:18 PM      XR CHEST PORT   Final Result      XR CHEST PORT   Final Result   No interval change. XR ABD (KUB)   Final Result   Mild gastric distention. XR CHEST PORT   Final Result   No interval change. XR CHEST PORT   Final Result   Bilateral airspace disease and small left pleural effusion. XR CHEST PORT   Final Result   No significant interval change. XR CHEST PORT   Final Result   1. There is persistent mixed interstitial and airspace disease diffusely   throughout the lungs. There has been an interval decrease in the airspace   pattern. These findings are consistent with an atypical pneumonia. 2.  The remainder of this examination is unchanged. XR CHEST PORT   Final Result      XR CHEST PORT   Final Result      XR CHEST PORT   Final Result   No significant interval change. XR CHEST PORT   Final Result   Increased moderate diffuse bilateral airspace opacities, likely a combination of   edema and airspace disease. Developing ARDS not excluded. XR CHEST PORT   Final Result      XR CHEST PORT   Final Result      XR CHEST PORT   Final Result      XR CHEST PORT   Final Result   There is persistent airspace disease within the mid to lower lung   zone predominance. This represents a heterogeneous pattern as might be   associated with an atypical pneumonia. There has been little interval change. XR CHEST PORT   Final Result      XR CHEST PORT   Final Result      XR CHEST PORT   Final Result   No significant interval change. XR CHEST PORT   Final Result      XR CHEST PORT    (Results Pending)   XR CHEST PORT    (Results Pending)   XR CHEST PORT    (Results Pending)       CT Results  (Last 48 hours)               04/27/21 1642  CT HEAD WO CONT Final result    Impression:  1. Stable right cavernous sinus meningioma. 2. No evidence of acute ischemia, hemorrhage or mass effect. 3. Mild small vessel ischemic change.        Narrative:  Axial images from the skull base to the vertex were obtained without the use of   contrast. Bone windows were reviewed. Sagittal and coronal reformatted images   were also reviewed. All CT scans at this facility are performed using dose   reduction optimization techniques as appropriate to a performed exam including   the following:   automated exposure control, adjustments of the mA and/or kV   according to patient size, or use of iterative reconstruction technique. Comparison with MRI February 4, 2020. INDICATION: Confusion, history of meningioma, headache. There is no evidence of hemorrhage. There is no acute ischemia. Ventricles, sulci and cisterns are intact. Minimal periventricular white matter   changes are present. The previously noted right cavernous sinus meningioma   appears unchanged compared to the previous MRI. No new mass lesions or mass   effect. Left frontal scalp sebaceous cyst.   Bone windows show no acute abnormalities. The visualized portions of the orbits,   paranasal sinuses and mastoid air cells are unremarkable. Review of the   reconstructed images show no additional findings. 04/27/21 1642  CT CHEST WO CONT Final result    Impression:  1. Extensive bilateral probably groundglass infiltrates, nonspecific. This could   represent atypical pneumonia such as Covid pneumonia. Recommend clinical   evaluation. 2. Pneumomediastinum extending into the lower right neck, this may be related to   the extensive parenchymal changes. Recommend follow-up as clinically warranted. Elvin Clark, the patient's nurse, was notified at 5:18 PM       Narrative:  Axial images through the chest were obtained without contrast. Sagittal and   coronal reformatted images were reviewed. Thin section images were obtained. INDICATION: Evaluate for pulmonary fibrosis. There is extensive bilateral predominantly groundglass infiltrates throughout   both lungs.  No significant peripheral fibrotic changes are noted. No significant   mediastinal or hilar lymphadenopathy. There is pneumomediastinum. No   pneumothorax. No gross endobronchial lesions. Bone windows and reconstructed   images show no acute bony findings. Incidental imaging through the upper abdomen   shows cholecystectomy clips. Small hiatal hernia. Assessment:     1. Acute respiratory failure with hypoxia  2. COVID-19 pneumonia  3. Shortness of breath  4.  Cough  5. Generalized weakness  6. Acute kidney injury  7. Obstructive sleep apnea on CPAP  8. Obesity  9. Pneumomediastinum    Plan:     Patient was saturating well at 55 L and 50% FiO2 on high flow nasal cannula oxygen, last night it was increased to 60% as she dropped her saturations. Keep patient on high flow nasal cannula oxygen as her oxygen saturation tends to drop otherwise. CT scan of chest results were reviewed which showed diffuse bilateral infiltrates. A repeat COVID-19 test was sent. Patient does have pneumomediastinum at this point we will just observe. Interestingly this pneumomediastinum it does not show up on chest x-ray. Repeat blood gas in the morning   Comfortable otherwise likely okay to transfer out of the ICU tomorrow. We will continue with diuresis,       ID is also on the case. Continue with high flow oxygen, wean down as tolerated to keep saturation more than 92%. Use BiPAP as needed and then nocturnally. Patient has acute respiratory failure with hypoxia due to COVID-19 pneumonia  Status post 5 days of remdesivir  Currently on Decadron 4 mg p.o. every 24 hours  Off azithromycin, on IV Zosyn per ID  Status post tocilizumab on 4/5/2021  Vitamin C and zinc  On therapeutic dose of Lovenox  Echo normal LVEF    Renal function stable today.   Monitor renal function with fluid changes  Check electrolytes and replace as needed    DVT and GI prophylaxis    Case discussed in detail with RN, RT, and care team  Thank you for involving me in the care of this patient  I will follow with you closely during hospitalization     Overall prognosis appears to be guarded    Greater than 45 minutes were spent in direct care with this patient.     Mesfin Pastrana MD   [facility]  4/28/2021

## 2021-04-28 NOTE — ROUTINE PROCESS
Four eyes skin assessment completed with Arturo Bird RN. Moisture/yeast noted under bilateral breasts, abdominal folds and groin area. Prescription cream applied as ordered. Patient on red bed and heel boots in place, will continue to monitor.

## 2021-04-28 NOTE — PROGRESS NOTES
Infectious Disease Progress Note           Subjective:   Doing much better today, more alert and following commands, decreasing FIO2 requirements. No acute events since last seen   Objective:   Physical Exam:     Visit Vitals  BP (!) 87/69 (BP Patient Position: At rest)   Pulse (!) 119   Temp 98.5 °F (36.9 °C)   Resp 25   Ht 5' 5.5\" (1.664 m)   Wt 207 lb 14.3 oz (94.3 kg)   SpO2 98%   Breastfeeding No   BMI 34.07 kg/m²    O2 Flow Rate (L/min): 40 l/min O2 Device: Hi flow nasal cannula    Temp (24hrs), Av.4 °F (36.9 °C), Min:97.8 °F (36.6 °C), Max:98.8 °F (37.1 °C)    701 - 1900  In: 60 [P.O.:60]  Out: -    1901 -  0700  In: -   Out: 350 [Urine:350]    General: NAD, lethargic, confused   HEENT: MICHELLE, Moist mucosa, on high flow   Lungs: CTA b/l, no wheeze/rhonchi   Heart: S1S2+, RRR, no murmur  Abdo: Soft, NT, ND, +BS   Exts: Trace edema, + pulses b/l   Skin: No wounds, No rashes or lesions    Data Review:       Recent Days:  Recent Labs     21  0530 21  0750   WBC 10.5 12.6*   HGB 11.2* 12.8   HCT 34.2* 38.9    313     Recent Labs     21  0535 21  0530 21  0750   BUN 35* 30* 27*   CREA 1.41* 1.35* 1.42*     Lab Results   Component Value Date/Time    C-Reactive protein 3.52 (H) 2021 04:00 AM      Microbiology: None     Diagnostics   CXR Results  (Last 48 hours)               21 0400  XR CHEST PORT Final result    Impression:  Findings/impression:       Improving patchy bilateral interstitial airspace disease/edema. Small pleural   effusions. No evidence of pneumothorax. Cardiomediastinal contours are stable. Ongoing pneumomediastinum. Visualized osseous structures are unchanged.            Narrative:  Study: XR CHEST PORT       Clinical indication: Respiratory failure       Comparison: Chest x-ray 2021.           21 0424  XR CHEST PORT Final result    Narrative:  1 view comparison yesterday Increased interstitial edema and continued hypoinflation. No effusion or   pneumothorax. Normal heart and mediastinum            Assessment/Plan     1. COVID-19 pneumonitis on admission, now w Suspected pulmonary fibrosis       Remains on high flow 2 w decreasing O2 requirements, desats w exertion       Improving b/l airspace disease on CXR      Monitor off antibiotics for now, wean off supplemental O2 as tolerated       Continue diuresis w lasix. Routine labs in the morning     2. Acute on chronic renal failure:  Likely from diuresis, will continue to monitor    3. Severe deconditioning due to acute illness, being followed by PT/OT     4.  Anemia of chronic disease: Stable hgb     Jeanne Lopez MD     4/28/2021

## 2021-04-28 NOTE — PROGRESS NOTES
Hospitalist Progress Note               Daily Progress Note: 4/28/2021  57-year-old female with acute hypoxic respiratory failure secondary to Covid pneumonia. Subjective: The patient is seen for follow  up. Remains in icu. Moments of increased confusion. C/o rt hip pain   No hfnc 40/40- sao2 98%  Discussed with RN. Problem List:  Problem List as of 4/28/2021 Date Reviewed: 11/21/2017          Codes Class Noted - Resolved    Respiratory failure ([address] ICD-10-CM: J96.90  ICD-9-CM: 518.81  4/4/2021 - Present        Ductal carcinoma in situ (DCIS) of left breast ICD-10-CM: D05.12  ICD-9-CM: 233.0  11/21/2017 - Present    Overview Addendum 11/21/2017  1:39 PM by Andrea Ruby MD     11/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  VA 6%                   Medications reviewed  Current Facility-Administered Medications   Medication Dose Route Frequency    LORazepam (ATIVAN) injection 1 mg  1 mg IntraVENous Q4H PRN    sodium chloride (OCEAN) 0.65 % nasal squeeze bottle 2 Spray  2 Spray Both Nostrils Q2H PRN    0.9% sodium chloride infusion 500 mL  500 mL IntraVENous CONTINUOUS    QUEtiapine (SEROquel) tablet 25 mg  25 mg Oral QHS    guaiFENesin ER (MUCINEX) tablet 600 mg  600 mg Oral Q12H    albuterol-ipratropium (DUO-NEB) 2.5 MG-0.5 MG/3 ML  3 mL Nebulization Q6H PRN    alum-mag hydroxide-simeth (MYLANTA) oral suspension 30 mL  30 mL Oral Q4H PRN    famotidine (PEPCID) tablet 20 mg  20 mg Oral DAILY    [Held by provider] furosemide (LASIX) injection 40 mg  40 mg IntraVENous BID    desitin/nystatin (1:1) topical compound   Topical BID    hydrOXYzine pamoate (VISTARIL) capsule 25 mg  25 mg Oral QID PRN    enoxaparin (LOVENOX) injection 90 mg  1 mg/kg SubCUTAneous Q12H    benzocaine-menthoL (CEPACOL) lozenge 1 Lozenge  1 Lozenge Mucous Membrane PRN    nystatin (MYCOSTATIN) 100,000 unit/mL oral suspension 500,000 Units  500,000 Units Oral QID    dextrose (D50W) injection syrg 12.5-25 g  25-50 mL IntraVENous PRN    insulin lispro (HUMALOG) injection   SubCUTAneous AC&HS    atorvastatin (LIPITOR) tablet 10 mg  10 mg Oral DAILY    levothyroxine (SYNTHROID) tablet 88 mcg  88 mcg Oral ACB    [Held by provider] lisinopril-hydroCHLOROthiazide (PRINZIDE, ZESTORETIC) 10-12.5 mg per tablet 1 Tab  1 Tab Oral DAILY    [Held by provider] meloxicam (MOBIC) tablet 7.5 mg  7.5 mg Oral DAILY    pantoprazole (PROTONIX) tablet 20 mg  20 mg Oral DAILY    sertraline (ZOLOFT) tablet 100 mg  100 mg Oral DAILY    glucose chewable tablet 16 g  4 Tab Oral PRN    dextrose (D50W) injection syrg 12.5-25 g  25-50 mL IntraVENous PRN    glucagon (GLUCAGEN) injection 1 mg  1 mg IntraMUSCular PRN    acetaminophen (TYLENOL) tablet 650 mg  650 mg Oral Q6H PRN    melatonin tablet 5 mg  5 mg Oral QHS PRN    ondansetron (ZOFRAN) injection 4 mg  4 mg IntraVENous Q4H PRN       Review of Systems:   Review of Systems   Constitutional: Positive for malaise/fatigue. Negative for chills and fever. HENT: Negative. Eyes: Negative. Respiratory: Positive for shortness of breath. Negative for cough, hemoptysis, sputum production and wheezing. Cardiovascular: Negative. Gastrointestinal: Negative. Genitourinary: Negative. Musculoskeletal: Negative. Skin: Negative. Neurological: Positive for weakness. Negative for dizziness, focal weakness and headaches. Endo/Heme/Allergies: Negative. Psychiatric/Behavioral: Negative.         Objective:   Physical Exam:     Visit Vitals  /86 (BP 1 Location: Left upper arm, BP Patient Position: At rest)   Pulse (!) 120   Temp 99.1 °F (37.3 °C)   Resp 17   Ht 5' 5.5\" (1.664 m)   Wt 94.3 kg (207 lb 14.3 oz)   SpO2 98%   Breastfeeding No   BMI 34.07 kg/m²    O2 Flow Rate (L/min): 40 l/min O2 Device: Hi flow nasal cannula    Temp (24hrs), Av.4 °F (36.9 °C), Min:97.8 °F (36.6 °C), Max:99.1 °F (37.3 °C)     07 -  1900  In: 60 [P.O.:60]  Out: -     07  In: - Out: 350 [Urine:350]    General:         Appears a bit lethargic, alert, not appearing distressed, pleasantly confused  EENT:              EOMI. Anicteric sclerae. MMM  Resp:              Decreased air entry bilaterally + bilateral bibasilar crackles  CV:                 Regular  rhythm, S1 plus S2, no murmurs rubs or   gallops  No edema  GI:                   Soft, Non distended, Non tender.  +Bowel sounds  Neurologic:      Alert and oriented X 2, normal speech,   Psych:    fair insight. Not anxious nor agitated  Skin:                No rashes. No jaundice. IV infiltrated rt forearm.   Data Review:       Recent Days:  Recent Labs     04/27/21  0530 04/26/21  0750   WBC 10.5 12.6*   HGB 11.2* 12.8   HCT 34.2* 38.9    313     Recent Labs     04/28/21  0535 04/27/21  0530 04/26/21  0750    136 139   K 4.4 3.0* 2.9*    97 99   CO2 30 31 30   * 175* 228*   BUN 35* 30* 27*   CREA 1.41* 1.35* 1.42*   CA 9.7 9.8 9.9   MG  --  1.7 2.1   ALB 2.6* 2.7* 2.8*   TBILI 0.4 0.4 0.4   ALT 32 35 37     Recent Labs     04/27/21  0515 04/26/21  0435   PH 7.47* 7.49*   PCO2 46* 41   PO2 65* 47*   HCO3 32* 31*   FIO2 55.0 65.0       24 Hour Results:  Recent Results (from the past 24 hour(s))   GLUCOSE, POC    Collection Time: 04/27/21  4:13 PM   Result Value Ref Range    Glucose (POC) 171 (H) 65 - 100 mg/dL    Performed by RACHNA BURGESS    COVID-19 RAPID TEST    Collection Time: 04/27/21  6:15 PM   Result Value Ref Range    Specimen source Nasopharyngeal      COVID-19 rapid test Not Detected Not Detected     GLUCOSE, POC    Collection Time: 04/27/21 10:17 PM   Result Value Ref Range    Glucose (POC) 175 (H) 65 - 100 mg/dL    Performed by Hasmukh Verdin    METABOLIC PANEL, COMPREHENSIVE    Collection Time: 04/28/21  5:35 AM   Result Value Ref Range    Sodium 138 136 - 145 mmol/L    Potassium 4.4 3.5 - 5.1 mmol/L    Chloride 102 97 - 108 mmol/L    CO2 30 21 - 32 mmol/L    Anion gap 6 5 - 15 mmol/L    Glucose 184 (H) 65 - 100 mg/dL    BUN 35 (H) 6 - 20 mg/dL    Creatinine 1.41 (H) 0.55 - 1.02 mg/dL    BUN/Creatinine ratio 25 (H) 12 - 20      GFR est AA 44 (L) >60 ml/min/1.73m2    GFR est non-AA 36 (L) >60 ml/min/1.73m2    Calcium 9.7 8.5 - 10.1 mg/dL    Bilirubin, total 0.4 0.2 - 1.0 mg/dL    AST (SGOT) 29 15 - 37 U/L    ALT (SGPT) 32 12 - 78 U/L    Alk. phosphatase 76 45 - 117 U/L    Protein, total 6.4 6.4 - 8.2 g/dL    Albumin 2.6 (L) 3.5 - 5.0 g/dL    Globulin 3.8 2.0 - 4.0 g/dL    A-G Ratio 0.7 (L) 1.1 - 2.2     GLUCOSE, POC    Collection Time: 04/28/21  7:15 AM   Result Value Ref Range    Glucose (POC) 191 (H) 65 - 100 mg/dL    Performed by [address] [place] [address], POC    Collection Time: 04/28/21 10:59 AM   Result Value Ref Range    Glucose (POC) 225 (H) 65 - 100 mg/dL    Performed by [address] [place] [address], POC    Collection Time: 04/28/21  3:05 PM   Result Value Ref Range    Glucose (POC) 210 (H) 65 - 100 mg/dL    Performed by Lashonda Luna            Assessment/     Patient Active Problem List   Diagnosis Code    Ductal carcinoma in situ (DCIS) of left breast D05.12    Respiratory failure ([facility] [address] J96.90           Acute respiratory failure with hypoxia secondary to COVID-19 pna:   -patient presented with acute respiratory failure with hypoxia requiring high flow nasal cannula for ventilatory support and based on patient's clinical presentation secondary to COVID-19 pneumonia, patient does not meet sepsis criteria at this time  -Completed Redemsivir  -Continue Decadron 6 mg IV daily  -Completed  azithromycin   -Continue to monitor respiratory status, moved to icu 4/18 am 2/2 fatigue, hypoxia on cpap, in icu appeared more settled initially and seems better tolerant of decreasing fio2 on hfnc while awake but desats with sleep.    -will need Aggressive pulmonary rehabilitation  -Pulling off mask has been intermittent problem, needs close monitoring.  Mitts were attempted though anxiety led to much increased anxiety/agitation. HAs had multiple episodes of removing mask and a few times with profound hypoxia resulting. ICu continued so can monitor closely as likely hindering progress and possibly contributing to her progressive confusion. Pulmonology consult appreciated,   -an[place]ia[place] slow wean d/t post covid fibrosis. Ltac declined by daughter.  -Infectious disease consult appreciated, off empiric zosyn. Encephalopathy:   ?metabolic, covid? Recurrent episodes profound hypoxia when she pulls mask off inadvertantly possibly contributing to anoxic injury. Abg prior with mild hypercapnea, bipap intermittently. Holding off on psychotropic rx. Hypertensioncurrently resolved, bp remains soft, sinus tachy. Will give 500 ml bolus follow     Hypothyroidism  continue Synthroid     Gastroesophageal reflux disease  continue Protonix once daily     Hyperglycemia due to type 2 diabetes   likely iatrogenic/steroids, has been showing better control, up trend over the past 24 hrs, monitor and adjust ssi  continue lispro ssi achs     Hypomagnesemiareplete/follow    Hypokalemia - replete/follow     Depression/anxietycontinue home Zoloft     Hx breast ca/meningioma. Xrt with breat ca 1.5 years ago.     ProphylaxisLovenox  FENcardiac diet, replete potassium and magnesium  Full code,   POA is marissa Espinosa [phone] pdated 4/28. Dispositionpending course, slow wean. Possible disposition to LTAC-  appreciated. Marissa Espinosa does not want her moved anywhere and wants her to stay in icu. Pt does still occasionally remove supplemental O2 with precipitous sao2 drops. time spent 35 minutes involving direct patient care as well as reviewing patient's labs and coordination of care with nursing staff     25.0 - 29.9 Overweight / Body mass index is 35.15 kg/m². Care Plan discussed with: Patient/Family, Nurse,  and Consultant .       Nguyen Jones MD

## 2021-04-29 NOTE — PROGRESS NOTES
MARTIN Culp at bedside attempted to place A line - unsuccessful. Spoke with family about central line placement and daughter Magan Espinosa declined line placement.

## 2021-04-29 NOTE — PROGRESS NOTES
Patient currently on OT caseload however patient with decline in status and now intubated and sedated. OT orders will be discontinued, if patient comes off vent/sedation please place new OT eval orders at that time. Thank you.

## 2021-04-29 NOTE — ROUTINE PROCESS
EKG completed and given to Dr. Anjel Weber and Dr. Claudia Tobar to review. No new orders at this time.

## 2021-04-29 NOTE — PROGRESS NOTES
Eduar Becker (daughter) requested blood transfusion to not be started. Will be placing patient on comfort care.   Will notify MD.

## 2021-04-29 NOTE — PROGRESS NOTES
The purpose of the visit was in response to the death of Ms. Luz Maria Pacheco. At the time of death her family was at the bedside. They shared of her amaris and her strength in the family. The family shared tearfully that they wanted her to go peacefully and be at rest. The shared how they were joyful that grateful that they were able to be with her at the bedside. The  provided prayer at the families request. As well the  listened empathically and reflectively. [facility] Josh Borjas.    can be reached by calling the  at [facility]  [phone]

## 2021-04-29 NOTE — PROGRESS NOTES
Patient currently on PT caseload however patient with decline in status and now intubated and sedated. PT orders will be discontinued, if patient comes off vent/sedation please place new PT eval orders at that time. Thank you.

## 2021-04-29 NOTE — PROGRESS NOTES
Spiritual Care Assessment/Progress Note  [facility]      NAME: Dennise Ward      MRN: [id number]  AGE: 78 y.o. SEX: female  Denominational Affiliation: Jainism   Language: English     4/28/2021     Total Time (in minutes): 31     Spiritual Assessment begun in [facility] CCU through conversation with:         []Patient        [x] Family    [] Friend(s)        Reason for Consult: Request by staff     Spiritual beliefs: (Please include comment if needed)     [x] Identifies with a amaris tradition:         [] Supported by a amaris community:            [] Claims no spiritual orientation:           [] Seeking spiritual identity:                [] Adheres to an individual form of spirituality:           [] Not able to assess:                           Identified resources for coping:      [x] Prayer                               [] Music                  [] Guided Imagery     [] Family/friends                 [] Pet visits     [] Devotional reading                         [] Unknown     [] Other:                                               Interventions offered during this visit: (See comments for more details)    Patient Interventions: Prayer (actual), Prayer (assurance of), Other (comment)(Silent support)     Family/Friend(s):  Affirmation of emotions/emotional suffering, Affirmation of amaris, Bridging, Catharsis/review of pertinent events in supportive environment, Coping skills reviewed/reinforced, Iconic (affirming the presence of God/Higher Power), Initial Assessment, Life review/legacy, Normalization of emotional/spiritual concerns, Prayer (actual), Prayer (assurance of), Denominational beliefs/image of God discussed     Plan of Care:     [] Support spiritual and/or cultural needs    [] Support AMD and/or advance care planning process      [] Support grieving process   [] Coordinate Rites and/or Rituals    [] Coordination with community clergy   [] No spiritual needs identified at this time   [] Detailed Plan of Care below (See Comments)  [] Make referral to Music Therapy  [] Make referral to Pet Therapy     [] Make referral to Addiction services  [] Make referral to [place]  [] Make referral to Spiritual Care Partner  [] No future visits requested        [x] Follow up upon further referrals     Comments:  Visited patient in the 2 CVICU for family care and support for patient who was recently intubated per nurse's request.  Patient, her daughter Yulia Toledo and her  HAVEN were present during the visit. Yulia Toledo was visibly tearful and shared how she did not expect her mother to reach current condition and seemed to process her emotions verbally as well as tearfully. She expressed her love and affection for her mother whom she described as most kind. Yulia Toledo seemed distressed about certain family dynamic between her siblings and expressed hope of having her mom move in with them. HAVEN seemed supportive towards his grieving wife and expressed verbal encouragement. I provided chaplaincy education and listened with empathy and reflection while exploring their needs and resources. I affirmed their support and good will towards Ms. Ezra Link whom they did not want to see suffer and provided supportive presence. I facilitated storytelling and provided prayer per their request.  Yulia Toledo seemed encouraged and said her mother seemed to have heard the prayer, indicated by mother squeezing her hand. I assured of my ongoing prayer and advised of  availability for further support. Chaplains will follow up if further referrals are requested. Chaplain Tony Cook M.Div.    can be reached by calling the  at [facility]  [phone]

## 2021-04-29 NOTE — PROGRESS NOTES
2130  Upon arrival the patient had orders for a 500 ml bolus of normal saline for hypotension, NS infusion at 50ml/hr, and a ABG. Patients BP was 78/41 but when the cuff location was changed it britton to 91/60. Bolus was completed and BP was 99/78. Venous gas was drawn with a pH 7.227 and a bicarbonate of 15.4. Patients saturations were dropping as well into the 60s and 70s and she was extremely agitated and restless. BP at that time was 65/36. Dr Colten Delgadillo was called and he ordered 1 amp of bicarb, Levophed drip and to recheck ABG. BP came up to 119/92 on Levophed. Respiratory was at the bedside making adjustment and saturations britton to the high 80s.    2200  Patients saturations continued to decline to the 60s and 70s despite adjustments to the [place] and BIPAP by respiratory. Work of breathing increased along with agitation. Patients OREN James was called and she gave permission to intubated if needed. Patient was intubated by anesthesia and saturations britton to 100.     0400  Patients BP continues to drop while on Levophed. Dr. Bell Powell was notified and ordered 250 ml bolus every hour for four hours. 0545  ABG results called to Dr Colten Delgadillo. No new orders given.

## 2021-04-29 NOTE — PROGRESS NOTES
Spoke with [employer] and told to call back with time of death- medically unsuitable due to Covid +

## 2021-04-29 NOTE — PROGRESS NOTES
Per Dr. Claudia Tobar- orders to increase pina gtt to 300mcg/min  levophed gtt increase to 130mcg/min

## 2021-04-29 NOTE — ROUTINE PROCESS
Dr. Edgar Smith has spoken with daughter and she states that she does not want patient to be resuscitated. DNR paperwork to be signed by daughter and Dr. Edgar Smith.

## 2021-04-29 NOTE — PROGRESS NOTES
Attempted to insert Arterial line ordered by Dr Heide Manning. Was able to visualize flash but unable to successfully insert catheter with blood return. Held pressure on site for approximately 5 minutes and bleeding had stopped. Dr Heide Manning notified and discontinued order for arterial line.

## 2021-04-29 NOTE — PROGRESS NOTES
Attempted to place an A line x 2 by RT and unsuccessful. Dr. Alli Barker asked to stop and bring the family in.

## 2021-04-29 NOTE — PROGRESS NOTES
Patient TOD 1630. Dr. Rachna Casarez notified of TOD. [employer] notified- patient not a candidate for organ donation, Supervisor notified, All belongings with family at bedside.

## 2021-04-29 NOTE — PROGRESS NOTES
Hospitalist Progress Note               Daily Progress Note: 4/29/2021  37-year-old female with acute hypoxic respiratory failure secondary to Covid pneumonia. Subjective: The patient is seen for follow  up. Events overnight noted, dw nursing/crit care. Intubated now  Hypotensive/ pressors titrated now on paz/levo/vaso.  hgb returned 5.8, repeated stat to 5.1. Last hgb ~11. No evidence gi bleeding though has been on therapeutic lovenox secondary to covid/increased d dimer  Remains in icu. Moments of increased confusion. C/o rt hip pain   No hfnc 40/40- sao2 98%  Discussed with RN. Problem List:  Problem List as of 4/29/2021 Date Reviewed: 11/21/2017          Codes Class Noted - Resolved    Respiratory failure ([facility] [address] ICD-10-CM: J96.90  ICD-9-CM: 518.81  4/4/2021 - Present        Ductal carcinoma in situ (DCIS) of left breast ICD-10-CM: D05.12  ICD-9-CM: 233.0  11/21/2017 - Present    Overview Addendum 11/21/2017  1:39 PM by Harsh Alvarado MD     11/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  OH 6%                   Medications reviewed  Current Facility-Administered Medications   Medication Dose Route Frequency    PHENYLephrine (PAZ-SYNEPHRINE) 30 mg in 0.9% sodium chloride 250 mL infusion   mcg/min IntraVENous TITRATE    vasopressin (VASOSTRICT) 20 Units in 0.9% sodium chloride 100 mL infusion  0.01-0.03 Units/min IntraVENous TITRATE    meropenem (MERREM) 1 g in sterile water (preservative free) 20 mL IV syringe  1 g IntraVENous Q12H    0.9% sodium chloride infusion 250 mL  250 mL IntraVENous PRN    sodium bicarbonate 8.4 % (1 mEq/mL) injection        sodium bicarbonate (8.4%) 100 mEq in 0.45% sodium chloride 1,000 mL infusion   IntraVENous CONTINUOUS    LORazepam (ATIVAN) injection 1 mg  1 mg IntraVENous Q4H PRN    NOREPINephrine (LEVOPHED) 8 mg in 5% dextrose 250mL (32 mcg/mL) infusion  0.5-30 mcg/min IntraVENous TITRATE    propofol (DIPRIVAN) 10 mg/mL infusion  0-50 mcg/kg/min IntraVENous TITRATE    sodium chloride (OCEAN) 0.65 % nasal squeeze bottle 2 Spray  2 Spray Both Nostrils Q2H PRN    QUEtiapine (SEROquel) tablet 25 mg  25 mg Oral QHS    albuterol-ipratropium (DUO-NEB) 2.5 MG-0.5 MG/3 ML  3 mL Nebulization Q6H PRN    alum-mag hydroxide-simeth (MYLANTA) oral suspension 30 mL  30 mL Oral Q4H PRN    famotidine (PEPCID) tablet 20 mg  20 mg Oral DAILY    [Held by provider] furosemide (LASIX) injection 40 mg  40 mg IntraVENous BID    desitin/nystatin (1:1) topical compound   Topical BID    hydrOXYzine pamoate (VISTARIL) capsule 25 mg  25 mg Oral QID PRN    enoxaparin (LOVENOX) injection 90 mg  1 mg/kg SubCUTAneous Q12H    benzocaine-menthoL (CEPACOL) lozenge 1 Lozenge  1 Lozenge Mucous Membrane PRN    nystatin (MYCOSTATIN) 100,000 unit/mL oral suspension 500,000 Units  500,000 Units Oral QID    dextrose (D50W) injection syrg 12.5-25 g  25-50 mL IntraVENous PRN    insulin lispro (HUMALOG) injection   SubCUTAneous AC&HS    atorvastatin (LIPITOR) tablet 10 mg  10 mg Oral DAILY    levothyroxine (SYNTHROID) tablet 88 mcg  88 mcg Oral ACB    [Held by provider] lisinopril-hydroCHLOROthiazide (PRINZIDE, ZESTORETIC) 10-12.5 mg per tablet 1 Tab  1 Tab Oral DAILY    [Held by provider] meloxicam (MOBIC) tablet 7.5 mg  7.5 mg Oral DAILY    pantoprazole (PROTONIX) tablet 20 mg  20 mg Oral DAILY    glucose chewable tablet 16 g  4 Tab Oral PRN    dextrose (D50W) injection syrg 12.5-25 g  25-50 mL IntraVENous PRN    glucagon (GLUCAGEN) injection 1 mg  1 mg IntraMUSCular PRN    acetaminophen (TYLENOL) tablet 650 mg  650 mg Oral Q6H PRN    melatonin tablet 5 mg  5 mg Oral QHS PRN    ondansetron (ZOFRAN) injection 4 mg  4 mg IntraVENous Q4H PRN       Review of Systems:   Review of Systems   Constitutional: Positive for malaise/fatigue. Negative for chills and fever. HENT: Negative. Eyes: Negative. Respiratory: Positive for shortness of breath.  Negative for cough, hemoptysis, sputum production and wheezing. Cardiovascular: Negative. Gastrointestinal: Negative. Genitourinary: Negative. Musculoskeletal: Negative. Skin: Negative. Neurological: Positive for weakness. Negative for dizziness, focal weakness and headaches. Endo/Heme/Allergies: Negative. Psychiatric/Behavioral: Negative. Objective:   Physical Exam:     Visit Vitals  BP (!) 99/53 (BP 1 Location: Left lower arm)   Pulse (!) 122   Temp 98.5 °F (36.9 °C)   Resp (!) 39   Ht 5' 5.5\" (1.664 m)   Wt 92 kg (202 lb 13.2 oz)   SpO2 97%   Breastfeeding No   BMI 33.24 kg/m²    O2 Flow Rate (L/min): 40 l/min O2 Device: Ventilator    Temp (24hrs), Av.5 °F (36.9 °C), Min:97.8 °F (36.6 °C), Max:99.1 °F (37.3 °C)    No intake/output data recorded.  1901 -  0700  In: 60 [P.O.:60]  Out: 200 [Urine:200]    General:         Appears a bit lethargic, alert, not appearing distressed, pleasantly confused  EENT:              EOMI. Anicteric sclerae. MMM  Resp:              Decreased air entry bilaterally + bilateral bibasilar crackles  CV:                 Regular  rhythm, S1 plus S2, no murmurs rubs or   gallops  No edema  GI:                   Soft, Non distended, Non tender.  +Bowel sounds  Neurologic:      Alert and oriented X 2, normal speech,   Psych:    fair insight. Not anxious nor agitated  Skin:                No rashes. No jaundice. IV infiltrated rt forearm.   Data Review:       Recent Days:  Recent Labs     21  0935 21  0530   WBC 29.5* 10.5   HGB 5.1* 11.2*   HCT 16.2* 34.2*   * 330     Recent Labs     21  0500 21  0535 21  0530   * 138 136   K 3.8 4.4 3.0*    102 97   CO2 18* 30 31   * 184* 175*   BUN 42* 35* 30*   CREA 2.74* 1.41* 1.35*   CA 7.8* 9.7 9.8   MG  --   --  1.7   PHOS 5.1*  --   --    ALB 1.7* 2.6* 2.7*   TBILI  --  0.4 0.4   ALT  --  32 35     Recent Labs     21  0515 21  2345 21   PH 7.36 7.37 7.47*   PCO2 30* 39 46*   PO2 148* 147* 65*   HCO3 18* 22 32*   FIO2 50.0 70.0 55.0       24 Hour Results:  Recent Results (from the past 24 hour(s))   GLUCOSE, POC    Collection Time: 04/28/21 10:59 AM   Result Value Ref Range    Glucose (POC) 225 (H) 65 - 100 mg/dL    Performed by [address] [place] [address], POC    Collection Time: 04/28/21  3:05 PM   Result Value Ref Range    Glucose (POC) 210 (H) 65 - 100 mg/dL    Performed by [address] [place] [address], POC    Collection Time: 04/28/21  9:16 PM   Result Value Ref Range    Glucose (POC) 302 (H) 65 - 100 mg/dL    Performed by Luis A Romero    BLOOD GAS, ARTERIAL    Collection Time: 04/28/21 11:45 PM   Result Value Ref Range    pH 7.37 7.35 - 7.45      PCO2 39 35 - 45 mmHg    PO2 147 (H) 75 - 100 mmHg    O2  >95 %    BICARBONATE 22 22 - 26 mmol/L    BASE DEFICIT 2.6 (H) 0 - 2 mmol/L    O2 METHOD VENT      FIO2 70.0 %    MODE AssistControl/Pressure Control      SET RATE 12      IPAP/PIP 32      EPAP/CPAP/PEEP 5.0      SITE Left Brachial      JACQUELIN'S TEST PASS     RENAL FUNCTION PANEL    Collection Time: 04/29/21  5:00 AM   Result Value Ref Range    Sodium 135 (L) 136 - 145 mmol/L    Potassium 3.8 3.5 - 5.1 mmol/L    Chloride 102 97 - 108 mmol/L    CO2 18 (L) 21 - 32 mmol/L    Anion gap 15 5 - 15 mmol/L    Glucose 333 (H) 65 - 100 mg/dL    BUN 42 (H) 6 - 20 mg/dL    Creatinine 2.74 (H) 0.55 - 1.02 mg/dL    BUN/Creatinine ratio 15 12 - 20      GFR est AA 21 (L) >60 ml/min/1.73m2    GFR est non-AA 17 (L) >60 ml/min/1.73m2    Calcium 7.8 (L) 8.5 - 10.1 mg/dL    Phosphorus 5.1 (H) 2.6 - 4.7 mg/dL    Albumin 1.7 (L) 3.5 - 5.0 g/dL   BLOOD GAS, ARTERIAL    Collection Time: 04/29/21  5:15 AM   Result Value Ref Range    pH 7.36 7.35 - 7.45      PCO2 30 (L) 35 - 45 mmHg    PO2 148 (H) 75 - 100 mmHg    O2  >95 %    BICARBONATE 18 (L) 22 - 26 mmol/L    BASE DEFICIT 8.0 (H) 0 - 2 mmol/L    O2 METHOD VENT      FIO2 50.0 %    MODE AssistControl/Pressure Control      SET RATE 12      IPAP/PIP 32      EPAP/CPAP/PEEP 5.0      SITE Right Radial      JACQUELIN'S TEST PASS     URINALYSIS W/MICROSCOPIC    Collection Time: 04/29/21  5:45 AM   Result Value Ref Range    Color Yellow      Appearance Turbid (A) Clear      Specific gravity 1.020 1.003 - 1.030      pH (UA) 5.0 5.0 - 8.0      Protein 30 (A) Negative mg/dL    Glucose Negative Negative mg/dL    Ketone Negative Negative mg/dL    Bilirubin Negative Negative      Blood Negative Negative      Urobilinogen 0.1 0.1 - 1.0 EU/dL    Nitrites Negative Negative      Leukocyte Esterase Moderate (A) Negative      WBC >100 (H) 0 - 4 /hpf    RBC >100 (H) 0 - 5 /hpf    Bacteria 3+ (A) Negative /hpf    Hyaline cast >20 (H) 0 - 5 /lpf    Mucus 4+ /lpf    PRESUMPTIVE YEAST Present      Other Urine Micro Present     CBC W/O DIFF    Collection Time: 04/29/21  9:35 AM   Result Value Ref Range    WBC 29.5 (H) 3.6 - 11.0 K/uL    RBC 1.74 (L) 3.80 - 5.20 M/uL    HGB 5.1 (LL) 11.5 - 16.0 g/dL    HCT 16.2 (LL) 35.0 - 47.0 %    MCV 93.1 80.0 - 99.0 FL    MCH 29.3 26.0 - 34.0 PG    MCHC 31.5 30.0 - 36.5 g/dL    RDW 18.7 (H) 11.5 - 14.5 %    PLATELET 636 (H) 984 - 400 K/uL    MPV 10.1 8.9 - 12.9 FL    NRBC 6.4 (H) 0  WBC    ABSOLUTE NRBC 1.90 (H) 0.00 - 0.01 K/uL           Assessment/     Patient Active Problem List   Diagnosis Code    Ductal carcinoma in situ (DCIS) of left breast D05.12    Respiratory failure (HCC) J96.90           Acute respiratory failure with hypoxia secondary to COVID-19 pna: post covid pulmonary fibrosis and has been unable to wean. 4/28 decompensated and was intubated. -Completed Redemsivir/dexamethasone/azithromycin  -hypotensive over the past 24 hrs, not appearing sepstic but empiric abx resumed,   --moved to icu 4/18 am 2/2 fatigue, hypoxia on cpap, in icu appeared more settled initially and seems better tolerant of decreasing fio2 on hfnc while awake but desats with sleep.     --lovenox therapeutic dose as d dimer increased 0.7-->2.8 & covid hypercoag state. Stopped 4/29 with precipitous hgb drop. --Pulling off mask has been intermittent problem, needs close monitoring. Mitts were attempted though anxiety led to much increased anxiety/agitation. HAs had multiple episodes of removing mask and a few times with profound hypoxia resulting. Pt now intubated  --Pulmonology consult appreciated,   --Infectious disease consult appreciated  4/28 resp decompensation/hypotensive/intubated. Acute drop hgb:  Last 11.2, today dropped to 5.8 and confirmed to 5.1. Hypovolemic shock it appears. Emergently to transfuse 2 units prbc. Check kub. No apparent bleed though has been on therapeutic lovenox. Encephalopathy:   ?metabolic, covid? Recurrent episodes profound hypoxia when she pulls mask off inadvertantly possibly contributing to anoxic injury. Abg prior with mild hypercapnea, bipap intermittently. Holding off on psychotropic rx. Now intubated, not responsive, critically ill    Hypertensioncurrently resolved, bp remains soft, sinus tachy. Will give 500 ml bolus follow     Hypothyroidism  continue Synthroid     Gastroesophageal reflux disease  continue Protonix once daily     Hyperglycemia due to type 2 diabetes   likely iatrogenic/steroids, has been showing better control, up trend over the past 24 hrs, monitor and adjust ssi  continue lispro ssi achs     Hypomagnesemiareplete/follow    Hypokalemia - replete/follow     Depression/anxietycontinue home Zoloft     Hx breast ca/meningioma. Xrt with breat ca 1.5 years ago.     ProphylaxisLovenox  FENcardiac diet, replete potassium and magnesium  Full code,   POA is marissa Link [phone] pdated 4/28. Dispositionpending course, slow wean. Possible disposition to LTAC- cm appreciated. Daughter Mir Link does not want her moved anywhere and wants her to stay in icu. Pt does still occasionally remove supplemental O2 with precipitous sao2 drops.     time spent 35 minutes involving direct patient care as well as reviewing patient's labs and coordination of care with nursing staff     25.0 - 29.9 Overweight / Body mass index is 35.15 kg/m². Care Plan discussed with: Patient/Family, Nurse,  and Consultant .       Edward Alonso MD

## 2021-04-29 NOTE — PROGRESS NOTES
Family decided to make patient comfort care. Will go with family's wishes. Pt will be given 5 mg Morphine Sulphate IV and then ET tube will be withdrwan. Pt will be placed on 100% NR face mask.

## 2021-04-29 NOTE — PROGRESS NOTES
Pulmonary and Critical Care Progress Note    Subjective:     Patient seen and examined in her room in the ICU   Her condition deteriorated overnight. Patient dropped her oxygen saturations. It dropped her blood pressure. She was then placed on ventilator support. Blood gas was done which showed 7.3 7/39/147/22 on 70% FiO2. ABG was repeated this morning at 5:15 AM which showed 7.3 6/30/140/18/1 100% on FiO2 50%. She was given 100 meq of bicarb. This morning she has been on Levophed and Paz-Synephrine. Vasopressin was also initiated.       Current Facility-Administered Medications   Medication Dose Route Frequency Provider Last Rate Last Admin    PHENYLephrine (PAZ-SYNEPHRINE) 30 mg in 0.9% sodium chloride 250 mL infusion   mcg/min IntraVENous TITRATE Khurram Valencia  mL/hr at 04/29/21 1000 200 mcg/min at 04/29/21 1000    vasopressin (VASOSTRICT) 20 Units in 0.9% sodium chloride 100 mL infusion  0.01-0.03 Units/min IntraVENous TITRATE Coby CARRASCO MD 12 mL/hr at 04/29/21 0958 0.04 Units/min at 04/29/21 0958    meropenem (MERREM) 1 g in sterile water (preservative free) 20 mL IV syringe  1 g IntraVENous Q12H Chelsea Fajardo MD        sodium bicarbonate 8.4 % (1 mEq/mL) injection             LORazepam (ATIVAN) injection 1 mg  1 mg IntraVENous Q4H PRN Ani Rico MD   1 mg at 04/28/21 0427    NOREPINephrine (LEVOPHED) 8 mg in 5% dextrose 250mL (32 mcg/mL) infusion  0.5-30 mcg/min IntraVENous TITRATE Gulshan Louis  mL/hr at 04/29/21 0636 120 mcg/min at 04/29/21 0636    0.9% sodium chloride infusion  125 mL/hr IntraVENous CONTINUOUS Yaneth Singh  mL/hr at 04/28/21 2350 125 mL/hr at 04/28/21 2350    propofol (DIPRIVAN) 10 mg/mL infusion  0-50 mcg/kg/min IntraVENous TITRATE Coby CARRASCO MD 8.5 mL/hr at 04/29/21 0959 15 mcg/kg/min at 04/29/21 0959    sodium chloride (OCEAN) 0.65 % nasal squeeze bottle 2 Spray  2 Spray Both Nostrils Q2H PRN Manuel Chiang MD   2 [place] at 04/27/21 1658    QUEtiapine (SEROquel) tablet 25 mg  25 mg Oral QHS Florinda Saldana MD   Stopped at 04/28/21 2200    albuterol-ipratropium (DUO-NEB) 2.5 MG-0.5 MG/3 ML  3 mL Nebulization Q6H PRN Alycia Sargent MD        alum-mag hydroxide-simeth (MYLANTA) oral suspension 30 mL  30 mL Oral Q4H PRN Von Barrera MD   30 mL at 04/26/21 1507    famotidine (PEPCID) tablet 20 mg  20 mg Oral DAILY Von Barrera MD   20 mg at 04/28/21 0930    [Held by provider] furosemide (LASIX) injection 40 mg  40 mg IntraVENous BID Alycia Sargent MD   Stopped at 04/28/21 0900    desitin/nystatin (1:1) topical compound   Topical BID Von Barrera MD   Given at 04/28/21 2340    hydrOXYzine pamoate (VISTARIL) capsule 25 mg  25 mg Oral QID PRN Itz CARRASCO MD   25 mg at 04/28/21 0930    enoxaparin (LOVENOX) injection 90 mg  1 mg/kg SubCUTAneous Q12H Herminio FillersKhurram MD   90 mg at 04/28/21 2340    benzocaine-menthoL (CEPACOL) lozenge 1 Lozenge  1 Lozenge Mucous Membrane PRN Von Barrera MD   1 Lozenge at 04/09/21 1640    nystatin (MYCOSTATIN) 100,000 unit/mL oral suspension 500,000 Units  500,000 Units Oral QID Von Barrera MD   500,000 Units at 04/28/21 2341    dextrose (D50W) injection syrg 12.5-25 g  25-50 mL IntraVENous PRN Von Barrera MD        insulin lispro (HUMALOG) injection   SubCUTAneous AC&HS Von Barrera MD   7 Units at 04/28/21 2341    atorvastatin (LIPITOR) tablet 10 mg  10 mg Oral DAILY Florinda Saldana MD   10 mg at 04/28/21 0930    levothyroxine (SYNTHROID) tablet 88 mcg  88 mcg Oral ACB Florinda Saldana MD   88 mcg at 04/28/21 0930    [Held by provider] lisinopril-hydroCHLOROthiazide (PRINZIDE, ZESTORETIC) 10-12.5 mg per tablet 1 Tab  1 Tab Oral DAILY Florinda Saldana MD   Stopped at 04/15/21 0900    [Held by provider] meloxicam (MOBIC) tablet 7.5 mg  7.5 mg Oral DAILY Kenney Crews MD   Stopped at 21 0900    pantoprazole (PROTONIX) tablet 20 mg  20 mg Oral DAILY Kenney Crews MD   20 mg at 21 0930    glucose chewable tablet 16 g  4 Tab Oral PRN Kenney Crews MD        dextrose (D50W) injection syrg 12.5-25 g  25-50 mL IntraVENous PRN Kenney Crews MD        glucagon (GLUCAGEN) injection 1 mg  1 mg IntraMUSCular PRN Kenney Crews MD        acetaminophen (TYLENOL) tablet 650 mg  650 mg Oral Q6H PRN Kenney Crews MD   650 mg at 21 1507    melatonin tablet 5 mg  5 mg Oral QHS PRN Kenney Crews MD   5 mg at 21 2212    ondansetron (ZOFRAN) injection 4 mg  4 mg IntraVENous Q4H PRN Kenney Crews MD   4 mg at 21 0739          No Known Allergies        Objective:     Blood pressure (!) 60/28, pulse (!) 121, temperature 98.5 °F (36.9 °C), resp. rate 27, height 5' 5.5\" (1.664 m), weight 92 kg (202 lb 13.2 oz), SpO2 99 %, not currently breastfeeding. Temp (24hrs), Av.5 °F (36.9 °C), Min:97.8 °F (36.6 °C), Max:99.1 °F (37.3 °C)      Intake and Output:  Current Shift: No intake/output data recorded. Last 3 Shifts:  1901 -  0700  In: 60 [P.O.:60]  Out: 200 [Urine:200]    Physical Exam:      General:  Patient is orally intubated and sedated. Patient is tachypneic  Eye: Reactive, symmetric  Throat and Neck: Supple, full facemask in place. Lung:  Patient has bilateral rhonchi. Heart: S1+S2. No murmurs. Abdomen: soft, non-tender. Bowel sounds normal. No masses; obese  Extremities: No significant edema  : Not done, Hyde catheter in place. Skin: No cyanosis  Neurologic:  Sedated.     Lab/Data Review:    Recent Results (from the past 24 hour(s))   GLUCOSE, POC    Collection Time: 21 10:59 AM   Result Value Ref Range    Glucose (POC) 225 (H) 65 - 100 mg/dL    Performed by [address] [place] [address], [facility]    Collection Time: 21  3:05 PM   Result Value Ref Range    Glucose (POC) 210 (H) 65 - 100 mg/dL    Performed by Brandt Davis, POC    Collection Time: 04/28/21  9:16 PM   Result Value Ref Range    Glucose (POC) 302 (H) 65 - 100 mg/dL    Performed by Shannon Tavares    BLOOD GAS, ARTERIAL    Collection Time: 04/28/21 11:45 PM   Result Value Ref Range    pH 7.37 7.35 - 7.45      PCO2 39 35 - 45 mmHg    PO2 147 (H) 75 - 100 mmHg    O2  >95 %    BICARBONATE 22 22 - 26 mmol/L    BASE DEFICIT 2.6 (H) 0 - 2 mmol/L    O2 METHOD VENT      FIO2 70.0 %    MODE AssistControl/Pressure Control      SET RATE 12      IPAP/PIP 32      EPAP/CPAP/PEEP 5.0      SITE Left Brachial      JACQUELIN'S TEST PASS     RENAL FUNCTION PANEL    Collection Time: 04/29/21  5:00 AM   Result Value Ref Range    Sodium 135 (L) 136 - 145 mmol/L    Potassium 3.8 3.5 - 5.1 mmol/L    Chloride 102 97 - 108 mmol/L    CO2 18 (L) 21 - 32 mmol/L    Anion gap 15 5 - 15 mmol/L    Glucose 333 (H) 65 - 100 mg/dL    BUN 42 (H) 6 - 20 mg/dL    Creatinine 2.74 (H) 0.55 - 1.02 mg/dL    BUN/Creatinine ratio 15 12 - 20      GFR est AA 21 (L) >60 ml/min/1.73m2    GFR est non-AA 17 (L) >60 ml/min/1.73m2    Calcium 7.8 (L) 8.5 - 10.1 mg/dL    Phosphorus 5.1 (H) 2.6 - 4.7 mg/dL    Albumin 1.7 (L) 3.5 - 5.0 g/dL   BLOOD GAS, ARTERIAL    Collection Time: 04/29/21  5:15 AM   Result Value Ref Range    pH 7.36 7.35 - 7.45      PCO2 30 (L) 35 - 45 mmHg    PO2 148 (H) 75 - 100 mmHg    O2  >95 %    BICARBONATE 18 (L) 22 - 26 mmol/L    BASE DEFICIT 8.0 (H) 0 - 2 mmol/L    O2 METHOD VENT      FIO2 50.0 %    MODE AssistControl/Pressure Control      SET RATE 12      IPAP/PIP 32      EPAP/CPAP/PEEP 5.0      SITE Right Radial      JACQUELIN'S TEST PASS     URINALYSIS W/MICROSCOPIC    Collection Time: 04/29/21  5:45 AM   Result Value Ref Range    Color Yellow      Appearance Turbid (A) Clear      Specific gravity 1.020 1.003 - 1.030      pH (UA) 5.0 5.0 - 8.0      Protein 30 (A) Negative mg/dL    Glucose Negative Negative mg/dL    Ketone Negative Negative mg/dL    Bilirubin Negative Negative      Blood Negative Negative      Urobilinogen 0.1 0.1 - 1.0 EU/dL    Nitrites Negative Negative      Leukocyte Esterase Moderate (A) Negative      WBC >100 (H) 0 - 4 /hpf    RBC >100 (H) 0 - 5 /hpf    Bacteria 3+ (A) Negative /hpf    Hyaline cast >20 (H) 0 - 5 /lpf    Mucus 4+ /lpf    PRESUMPTIVE YEAST Present      Other Urine Micro Present         XR CHEST PORT   Final Result      1. Similar appearance of bilateral opacities. 2. Small amount of pneumomediastinum appears slightly decreased. 3. Enteric tube has been retracted with tip now at the gastroesophageal   junction. Correlate with desired position and consider advancement approximately   10-12 cm for more definitive placement in the stomach. The above was discussed with and acknowledged by Johanna Duran of the ICU by   telephone on  4/29/2021 8:02 AM.      XR CHEST PORT   Final Result   ET tube placed, with tip 2.5 cm above the asif. Gastric tube   placed with sidehole at the level of the body of the stomach. Cholecystectomy   clips. Mild changes of pneumomediastinum, similar to previous. No definite   pneumothorax. Patchy bilateral groundglass opacities, potentially viral   pneumonia. XR HIP LT W OR WO PELV 2-3 VWS   Final Result      XR CHEST PORT   Final Result   Findings/impression:      Improving patchy bilateral interstitial airspace disease/edema. Small pleural   effusions. No evidence of pneumothorax. Cardiomediastinal contours are stable. Ongoing pneumomediastinum. Visualized osseous structures are unchanged. CT HEAD WO CONT   Final Result   1. Stable right cavernous sinus meningioma. 2. No evidence of acute ischemia, hemorrhage or mass effect. 3. Mild small vessel ischemic change. CT CHEST WO CONT   Final Result   1. Extensive bilateral probably groundglass infiltrates, nonspecific. This could   represent atypical pneumonia such as Covid pneumonia. Recommend clinical   evaluation. 2. Pneumomediastinum extending into the lower right neck, this may be related to   the extensive parenchymal changes. Recommend follow-up as clinically warranted. Tara Corrales, the patient's nurse, was notified at 5:18 PM      XR CHEST PORT   Final Result      XR CHEST PORT   Final Result   No interval change. XR ABD (KUB)   Final Result   Mild gastric distention. XR CHEST PORT   Final Result   No interval change. XR CHEST PORT   Final Result   Bilateral airspace disease and small left pleural effusion. XR CHEST PORT   Final Result   No significant interval change. XR CHEST PORT   Final Result   1. There is persistent mixed interstitial and airspace disease diffusely   throughout the lungs. There has been an interval decrease in the airspace   pattern. These findings are consistent with an atypical pneumonia. 2.  The remainder of this examination is unchanged. XR CHEST PORT   Final Result      XR CHEST PORT   Final Result      XR CHEST PORT   Final Result   No significant interval change. XR CHEST PORT   Final Result   Increased moderate diffuse bilateral airspace opacities, likely a combination of   edema and airspace disease. Developing ARDS not excluded. XR CHEST PORT   Final Result      XR CHEST PORT   Final Result      XR CHEST PORT   Final Result      XR CHEST PORT   Final Result   There is persistent airspace disease within the mid to lower lung   zone predominance. This represents a heterogeneous pattern as might be   associated with an atypical pneumonia. There has been little interval change. XR CHEST PORT   Final Result      XR CHEST PORT   Final Result      XR CHEST PORT   Final Result   No significant interval change.       XR CHEST PORT   Final Result      XR CHEST PORT    (Results Pending)   XR CHEST PORT    (Results Pending)   XR CHEST PORT    (Results Pending)   XR CHEST PORT    (Results Pending)       CT Results (Last 48 hours)               04/27/21 1642  CT HEAD WO CONT Final result    Impression:  1. Stable right cavernous sinus meningioma. 2. No evidence of acute ischemia, hemorrhage or mass effect. 3. Mild small vessel ischemic change. Narrative:  Axial images from the skull base to the vertex were obtained without the use of   contrast. Bone windows were reviewed. Sagittal and coronal reformatted images   were also reviewed. All CT scans at this facility are performed using dose   reduction optimization techniques as appropriate to a performed exam including   the following:   automated exposure control, adjustments of the mA and/or kV   according to patient size, or use of iterative reconstruction technique. Comparison with MRI February 4, 2020. INDICATION: Confusion, history of meningioma, headache. There is no evidence of hemorrhage. There is no acute ischemia. Ventricles, sulci and cisterns are intact. Minimal periventricular white matter   changes are present. The previously noted right cavernous sinus meningioma   appears unchanged compared to the previous MRI. No new mass lesions or mass   effect. Left frontal scalp sebaceous cyst.   Bone windows show no acute abnormalities. The visualized portions of the orbits,   paranasal sinuses and mastoid air cells are unremarkable. Review of the   reconstructed images show no additional findings. 04/27/21 1642  CT CHEST WO CONT Final result    Impression:  1. Extensive bilateral probably groundglass infiltrates, nonspecific. This could   represent atypical pneumonia such as Covid pneumonia. Recommend clinical   evaluation. 2. Pneumomediastinum extending into the lower right neck, this may be related to   the extensive parenchymal changes. Recommend follow-up as clinically warranted.        Kalen Currie, the patient's nurse, was notified at 5:18 PM       Narrative:  Axial images through the chest were obtained without contrast. Sagittal and coronal reformatted images were reviewed. Thin section images were obtained. INDICATION: Evaluate for pulmonary fibrosis. There is extensive bilateral predominantly groundglass infiltrates throughout   both lungs. No significant peripheral fibrotic changes are noted. No significant   mediastinal or hilar lymphadenopathy. There is pneumomediastinum. No   pneumothorax. No gross endobronchial lesions. Bone windows and reconstructed   images show no acute bony findings. Incidental imaging through the upper abdomen   shows cholecystectomy clips. Small hiatal hernia. Assessment:     1. Acute respiratory failure with hypoxia  2. COVID-19 pneumonia  3. Shortness of breath  4.  Cough  5. Generalized weakness  6. Acute kidney injury  7. Obstructive sleep apnea on CPAP  8. Obesity  9. Pneumomediastinum    Plan:     Patient is condition deteriorated last night, she dropped her saturations and subsequently dropped her blood pressure. She was intubated. Blood gas done last night showed 7.3 7/39/147/20 2/100% on 70% FiO2 with ventilator setting of assist control rate of 12 and PEEP of 5. She was started on Levophed initially and later on was also started on Tadeo-Synephrine because of persistent low blood pressure. She was given 2 A of bicarbonate intravenously . Early this morning her blood pressure was persistently low. Vasopressin was then added. At bedside she is tachypneic and stiff. Stat blood gas was done which showed   7.3 1/27/65/15/92% on 40% FiO2  We'll increase FiO2 to 80%. Stat CBC showed hemoglobin has dropped down to 5.1, it was 11.2 day before yesterday. Does not have any obvious source of bleeding from stools. Will require CT scanning of abdomen to see any retroperitoneal bleed. We'll go ahead and transfuse 2 units of packed RBC stat . She would require 2 more units in a little while. Patient is currently too sick to go down for CT scan of abdomen.   KUB is ordered in the meanwhile. I had lengthy discussion with patient's family, patient's daughter Yulia Toledo shown her CT scan of chest which has revealed extensive groundglass changes bilaterally. Her chest x-ray from today was reviewed patient does have pneumomediastinum has slightly decreased in size as compared to yesterday at this point we will just observe. I have also discussed advanced directive with family, as per patient's wishes she'll want to be stated. Patient is ordered to be DNR. ID is also on the case. Discussed with attending physician and ID    On therapeutic dose of Lovenox  Echo normal LVEF    Renal function stable today. Monitor renal function with fluid changes  Check electrolytes and replace as needed    DVT and GI prophylaxis    Case discussed in detail with RN, RT, and care team  Thank you for involving me in the care of this patient  I will follow with you closely during hospitalization     Patient's condition is extremely critical, this was explained to the family. Greater than 55 minutes were spent in direct care with this patient.     Maykel Lew MD   [facility]  4/29/2021

## 2021-04-29 NOTE — PROGRESS NOTES
Daughter Floresita Santoyo is requesting blood transfusion to wait. Family is considering comfort care at this time.

## 2021-04-29 NOTE — ROUTINE PROCESS
Dr. Brian Sheth in conference room speaking with daughter regarding code status and pts condition. No final decisions have been made. Pt continues to be a full code at this time.

## 2021-04-29 NOTE — PROGRESS NOTES
Comments:  Visited patient in the 2 CVICU for family care and support for patient per nurse's request.  Patient, her daughter Brayan Good and other family relatives were present during the visit. They engaged in life review and shared what a sweet, caring and giving person Ms. Shanon Lorenzo was. Many seemed to process their emotions verbally and tearfully while also sharing stories about her humorous episodes. They shared about Ms. Shanon Lorenzo strong Sikh spirituality and her appreciation of Dr. Ana [employer]. Brayan Good continually expressed her affection for her mother and stated God would heal her or take her but not let her suffer continually. I listened with empathy and reflection while exploring their needs and normalizing their sad emotions. I affirmed their love and affection for Ms. Shanon Lorenzo, their supportive relationships, and facilitated storytelling. I offered and provided prayer and advised of  availability for further support. I collaborated with staff and provided support. Chaplains will follow up if further referrals are requested.     CAROLANN Schultz.Div.    can be reached by calling the  at [facility]  [phone]

## 2021-04-29 NOTE — PROGRESS NOTES
Extubated patient and placed patient on 100% NRB mask per order by Dr Chandni Jefferson.  Family and nurse at bedside

## 2021-04-29 NOTE — PROGRESS NOTES
Infectious Disease Progress Note           Subjective:   Pt seen and examined at bedside. Worsening clinical status last night, now intubated and on pressor support. Daughter and other fmly members at bedside, their questions were answered from ID stand point. Pt was doing well during my assessment on  w decreased O2 requirments. WBC up to 29.5K, and hgb 5.1, Afebrile   Objective:   Physical Exam:     Visit Vitals  BP 96/63   Pulse (!) 122   Temp 98.5 °F (36.9 °C)   Resp (!) 40   Ht 5' 5.5\" (1.664 m)   Wt 202 lb 13.2 oz (92 kg)   SpO2 100%   Breastfeeding No   BMI 33.24 kg/m²    O2 Flow Rate (L/min): 40 l/min O2 Device: Ventilator    Temp (24hrs), Av.6 °F (37 °C), Min:97.8 °F (36.6 °C), Max:99.1 °F (37.3 °C)    No intake/output data recorded.  1901 -  0700  In: 61 [P.O.:60]  Out: 200 [Urine:200]    General: NAD, intubated and sedated   HEENT: MICHELLE, Moist mucosa, ETT in place   Lungs: CTA b/l, no wheeze/rhonchi   Heart: S1S2+, RRR, no murmur  Abdo: Soft, NT, ND, +BS   Exts: Trace edema, + pulses b/l   Skin: No wounds, No rashes or lesions    Data Review:       Recent Days:  Recent Labs     21  0935 21  0530   WBC 29.5* 10.5   HGB 5.1* 11.2*   HCT 16.2* 34.2*   * 330     Recent Labs     21  0500 21  0535 21  0530   BUN 42* 35* 30*   CREA 2.74* 1.41* 1.35*     Lab Results   Component Value Date/Time    C-Reactive protein 3.52 (H) 2021 04:00 AM      Microbiology: None     Diagnostics   CXR Results  (Last 48 hours)               21 0450  XR CHEST PORT Final result    Impression:      1. Similar appearance of bilateral opacities. 2. Small amount of pneumomediastinum appears slightly decreased. 3. Enteric tube has been retracted with tip now at the gastroesophageal   junction. Correlate with desired position and consider advancement approximately   10-12 cm for more definitive placement in the stomach.        The above was discussed with and acknowledged by Raffy Esquivel of the ICU by   telephone on  4/29/2021 8:02 AM.       Narrative:  Examination: XR CHEST PORT        History: Respiratory failure       Comparison: Chest radiograph 4/28/2021       FINDINGS:       Single frontal portable view of the chest. Endotracheal tube projects over the   low intrathoracic trachea. Enteric tube tip is at the gastroesophageal junction. Symmetric low lung volumes. Diffuse hazy and reticular opacities in the lungs   appearing similar to prior. No pneumothorax or large pleural effusion is   evident. Small amount of pneumomediastinum appearing roughly similar to prior. Osseous structures appear unchanged. Surgical clips projecting over the left   axillary region. 04/28/21 2214  XR CHEST PORT Final result    Impression:  ET tube placed, with tip 2.5 cm above the asif. Gastric tube   placed with sidehole at the level of the body of the stomach. Cholecystectomy   clips. Mild changes of pneumomediastinum, similar to previous. No definite   pneumothorax. Patchy bilateral groundglass opacities, potentially viral   pneumonia. Narrative:  Portable chest, 2207 hours, compared with 0355 hours. 04/28/21 0400  XR CHEST PORT Final result    Impression:  Findings/impression:       Improving patchy bilateral interstitial airspace disease/edema. Small pleural   effusions. No evidence of pneumothorax. Cardiomediastinal contours are stable. Ongoing pneumomediastinum. Visualized osseous structures are unchanged. Narrative:  Study: XR CHEST PORT       Clinical indication: Respiratory failure       Comparison: Chest x-ray 4/27/2021. Assessment/Plan     1. Septic shock:Suspected aspiration, intubated and on pressor support       WBC up to 29.5 and hgb 5.1      Urine Cx ordered.  Pt was on Zosyn, discontinued on 04/26      B/l infiltrates on todays CXR      Will resume antibiotics w Meropenem Repeat blood, urine and sputum Cxs     2. COVID-19 pneumonitis on admission, now w Suspected pulmonary fibrosis       Intubated last night, diffuse b/l infiltrates on CXR       Will check ETT aspirate Cx     3. Acute on chronic renal failure:  Cr rising on todays labs     4.  Acute on chronic anemia: Hgb of 5.1 on todays labs, PRBC transfusion ordered by daughter declined     Pt has had waxing and waning clinical status since admission which culminated to intubation last night     Dilip Rojas MD     4/29/2021

## 2021-05-02 LAB
BACTERIA SPEC CULT: ABNORMAL
COLONY COUNT,CNT: ABNORMAL
SPECIAL REQUESTS,SREQ: ABNORMAL

## 2021-05-10 NOTE — DISCHARGE SUMMARY
HOSPITALIST DISCHARGE SUMMARY 
 
NAME: Florencio Butler :  1945 MRN:  [id number] Date/Time:  5/10/2021 9:10 AM 
 
DISCHARGE DIAGNOSIS: 
Covid-19 pneumonia Acute hypoxemic respiratory failure Post covid pulmonary fibrosis Suspected retroperitoneal bleed and acute blood loss anemia 
maritza Obesity Breast ca Meningioma 
gerd 
encepaopathy multifactorial 
 
Admission Diagnosis: 
Acute respiratory failure with hypoxemia, covid-19 pneumonia Procedures: see electronic medical records for all procedures/Xrays and details which were not copied into this note but were reviewed prior to creation of Plan. DISCHARGE SUMMARY/HOSPITAL COURSE: for full details see H&P, daily progress notes, labs, consult notes. Briefly As Per HPI: 
Very pleasant 78year old fever presented with a history of Hypertension, Gerd, Diabetes, hypothyroid, obesit(bmi ~35), depression, brain tumor and treated breast cancer within the past -1.5 years, as well as maritza on cpap,  presented to ED with 1 week history of worsening shortness of breath. She received her Covid-19 initial J&J vaccine 1 week prior. She attended family reunion recently and family member subsequently tested positive for Covid-19. She complained of weakness, loss of appetite, chills, chest tightness. Hospital course: Ms Yuki Munoz admitted with Acute respiratory failure with hypoxemia secondary to Covid-19 pneumonia. Followed by Pulmonology/Critical Care, ID, Hospitalist service. Supplemental O2 titrated to maintain sats>92% and received appropriate covid therapies including Dexamethasone, Actemra x 2, Dexamethasone, azithromycin, vitc/zinc and therapeutic lovenox d/t rising d dimer and vte known risk. Hypoxemia persisted and O2 needs increased and were unable to wean her down from increased )2 requirements. . Encephalopathic/delirious intermittently surmised covid related.  There were multiple instances of mask removal by patient. She was observed closely in icu, transferred out to floor and agin multiple episodes of removing mask with profound hypoxia subsequently. Ultimately camera was placed in patients room for close monitoring. Covid limitation reportedly limited 1:1 observation. Patient eventually appeared to have increasing distress and was moved back to icu for close monitoring. Increased O2 requirements persisted requiring bipap intermittently and hfnc but could not wean her down. Post covid fibrosis surmised. Anticipated long difficult course to wean LTAC considered, declined. Along the way also with episodes profound hypotension, initially surmised s/t diuresis for apparent developing pulm edema. Diuresis continued intermittently. On the evening prior to her demise she decompensated, was intubated, hypotensive ultimately requiring multiple pressors and hgb reported at 5.8, prior reading 11.2. Prbc ordered emergently having surmised rp bleed as on exam abd was benign. Though after discussion pulmonology had with daughter luis jimenezid sequelae, decision  was  Made to continue with comfort measures only. She passed 4/29/21 @ 1630.
